# Patient Record
Sex: MALE | Race: WHITE | NOT HISPANIC OR LATINO | Employment: OTHER | URBAN - METROPOLITAN AREA
[De-identification: names, ages, dates, MRNs, and addresses within clinical notes are randomized per-mention and may not be internally consistent; named-entity substitution may affect disease eponyms.]

---

## 2017-01-16 RX ORDER — IRBESARTAN 300 MG/1
TABLET ORAL
Qty: 90 TABLET | Refills: 0 | Status: SHIPPED | OUTPATIENT
Start: 2017-01-16 | End: 2017-04-05 | Stop reason: SDUPTHER

## 2017-01-16 RX ORDER — LEVOTHYROXINE SODIUM 0.05 MG/1
TABLET ORAL
Qty: 90 TABLET | Refills: 0 | Status: SHIPPED | OUTPATIENT
Start: 2017-01-16 | End: 2017-04-11 | Stop reason: SDUPTHER

## 2017-01-17 DIAGNOSIS — I10 ESSENTIAL HYPERTENSION: Primary | ICD-10-CM

## 2017-01-17 DIAGNOSIS — E11.9 TYPE 2 DIABETES MELLITUS WITHOUT COMPLICATION, UNSPECIFIED LONG TERM INSULIN USE STATUS: ICD-10-CM

## 2017-01-17 DIAGNOSIS — Z79.899 ENCOUNTER FOR LONG-TERM (CURRENT) USE OF MEDICATIONS: ICD-10-CM

## 2017-01-17 DIAGNOSIS — E03.4 HYPOTHYROIDISM DUE TO ACQUIRED ATROPHY OF THYROID: ICD-10-CM

## 2017-01-18 LAB
ALBUMIN SERPL-MCNC: 4.6 G/DL (ref 3.5–5.2)
ALBUMIN/GLOB SERPL: 2.1 G/DL
ALP SERPL-CCNC: 73 U/L (ref 39–117)
ALT SERPL-CCNC: 25 U/L (ref 1–41)
AST SERPL-CCNC: 13 U/L (ref 1–40)
BASOPHILS # BLD AUTO: 0.05 10*3/MM3 (ref 0–0.2)
BASOPHILS NFR BLD AUTO: 0.8 % (ref 0–1.5)
BILIRUB SERPL-MCNC: 0.4 MG/DL (ref 0.1–1.2)
BUN SERPL-MCNC: 19 MG/DL (ref 8–23)
BUN/CREAT SERPL: 19.6 (ref 7–25)
CALCIUM SERPL-MCNC: 9.4 MG/DL (ref 8.6–10.5)
CHLORIDE SERPL-SCNC: 102 MMOL/L (ref 98–107)
CHOLEST SERPL-MCNC: 192 MG/DL (ref 0–200)
CO2 SERPL-SCNC: 24.5 MMOL/L (ref 22–29)
CREAT SERPL-MCNC: 0.97 MG/DL (ref 0.76–1.27)
EOSINOPHIL # BLD AUTO: 0.26 10*3/MM3 (ref 0–0.7)
EOSINOPHIL NFR BLD AUTO: 4.3 % (ref 0.3–6.2)
ERYTHROCYTE [DISTWIDTH] IN BLOOD BY AUTOMATED COUNT: 13.5 % (ref 11.5–14.5)
FT4I SERPL CALC-MCNC: 2.5 (ref 1.2–4.9)
GLOBULIN SER CALC-MCNC: 2.2 GM/DL
GLUCOSE SERPL-MCNC: 148 MG/DL (ref 65–99)
HBA1C MFR BLD: 8.55 % (ref 4.8–5.6)
HCT VFR BLD AUTO: 48.4 % (ref 40.4–52.2)
HDLC SERPL-MCNC: 57 MG/DL (ref 40–60)
HGB BLD-MCNC: 14.9 G/DL (ref 13.7–17.6)
IMM GRANULOCYTES # BLD: 0.02 10*3/MM3 (ref 0–0.03)
IMM GRANULOCYTES NFR BLD: 0.3 % (ref 0–0.5)
LDLC SERPL CALC-MCNC: 86 MG/DL (ref 0–100)
LDLC/HDLC SERPL: 1.51 {RATIO}
LYMPHOCYTES # BLD AUTO: 2.18 10*3/MM3 (ref 0.9–4.8)
LYMPHOCYTES NFR BLD AUTO: 36.4 % (ref 19.6–45.3)
MCH RBC QN AUTO: 29.6 PG (ref 27–32.7)
MCHC RBC AUTO-ENTMCNC: 30.8 G/DL (ref 32.6–36.4)
MCV RBC AUTO: 96 FL (ref 79.8–96.2)
MONOCYTES # BLD AUTO: 0.69 10*3/MM3 (ref 0.2–1.2)
MONOCYTES NFR BLD AUTO: 11.5 % (ref 5–12)
NEUTROPHILS # BLD AUTO: 2.79 10*3/MM3 (ref 1.9–8.1)
NEUTROPHILS NFR BLD AUTO: 46.7 % (ref 42.7–76)
PLATELET # BLD AUTO: 211 10*3/MM3 (ref 140–500)
POTASSIUM SERPL-SCNC: 4.5 MMOL/L (ref 3.5–5.2)
PROT SERPL-MCNC: 6.8 G/DL (ref 6–8.5)
RBC # BLD AUTO: 5.04 10*6/MM3 (ref 4.6–6)
SODIUM SERPL-SCNC: 143 MMOL/L (ref 136–145)
T3RU NFR SERPL: 30 % (ref 24–39)
T4 SERPL-MCNC: 8.4 UG/DL (ref 4.5–12)
TRIGL SERPL-MCNC: 244 MG/DL (ref 0–150)
TSH SERPL DL<=0.005 MIU/L-ACNC: 5.26 UIU/ML (ref 0.45–4.5)
VLDLC SERPL CALC-MCNC: 48.8 MG/DL (ref 5–40)
WBC # BLD AUTO: 5.99 10*3/MM3 (ref 4.5–10.7)

## 2017-01-23 ENCOUNTER — TELEPHONE (OUTPATIENT)
Dept: FAMILY MEDICINE CLINIC | Facility: CLINIC | Age: 67
End: 2017-01-23

## 2017-01-23 NOTE — TELEPHONE ENCOUNTER
----- Message from Gianna Adler sent at 1/23/2017 12:17 PM EST -----  Regarding: LAB RESULTS  Contact: [phone]  [facility]: 1/17/17 ([facility]) 6/3/16 (PHYSICAL)  NEXT APPT: NONE    PATIENT WOULD LIKE A CALL WITH HIS LAB RESULTS.    THANK YOU

## 2017-01-25 ENCOUNTER — TELEPHONE (OUTPATIENT)
Dept: FAMILY MEDICINE CLINIC | Facility: CLINIC | Age: 67
End: 2017-01-25

## 2017-01-25 NOTE — TELEPHONE ENCOUNTER
----- Message from Gianna Adler sent at 1/25/2017 11:55 AM EST -----  Regarding: BLOOD WORK  Contact: [phone]  [facility]: 1/17/17 LABS    PATIENT WANTS A COPY OF HIS LAB RESULTS TAKEN ON ABOVE DATE. DR LISA HAS NOT SIGNED OFF ON THEM YET. PATIENT COMING IN TOMORROW TO PICK HIS SAMPLES UP AND TO GET RESULTS.    THANK YOU  Lab results printed and ready for

## 2017-01-30 RX ORDER — PRAVASTATIN SODIUM 80 MG/1
TABLET ORAL
Qty: 90 TABLET | Refills: 0 | Status: SHIPPED | OUTPATIENT
Start: 2017-01-30 | End: 2017-05-07 | Stop reason: SDUPTHER

## 2017-03-08 RX ORDER — CANAGLIFLOZIN 300 MG/1
TABLET, FILM COATED ORAL
Qty: 30 TABLET | Refills: 0 | Status: SHIPPED | OUTPATIENT
Start: 2017-03-08 | End: 2017-04-05 | Stop reason: SDUPTHER

## 2017-04-06 RX ORDER — IRBESARTAN 300 MG/1
TABLET ORAL
Qty: 90 TABLET | Refills: 0 | Status: SHIPPED | OUTPATIENT
Start: 2017-04-06 | End: 2017-07-03 | Stop reason: SDUPTHER

## 2017-04-06 RX ORDER — CANAGLIFLOZIN 300 MG/1
TABLET, FILM COATED ORAL
Qty: 30 TABLET | Refills: 0 | Status: SHIPPED | OUTPATIENT
Start: 2017-04-06 | End: 2017-05-06 | Stop reason: SDUPTHER

## 2017-04-11 RX ORDER — LEVOTHYROXINE SODIUM 0.05 MG/1
TABLET ORAL
Qty: 90 TABLET | Refills: 0 | Status: SHIPPED | OUTPATIENT
Start: 2017-04-11 | End: 2017-12-06 | Stop reason: SDUPTHER

## 2017-05-08 RX ORDER — CANAGLIFLOZIN 300 MG/1
TABLET, FILM COATED ORAL
Qty: 30 TABLET | Refills: 0 | Status: SHIPPED | OUTPATIENT
Start: 2017-05-08 | End: 2017-06-03 | Stop reason: SDUPTHER

## 2017-05-08 RX ORDER — PRAVASTATIN SODIUM 80 MG/1
TABLET ORAL
Qty: 90 TABLET | Refills: 0 | Status: SHIPPED | OUTPATIENT
Start: 2017-05-08 | End: 2017-08-10 | Stop reason: SDUPTHER

## 2017-06-05 RX ORDER — CANAGLIFLOZIN 300 MG/1
TABLET, FILM COATED ORAL
Qty: 30 TABLET | Refills: 1 | Status: SHIPPED | OUTPATIENT
Start: 2017-06-05 | End: 2017-08-10 | Stop reason: SDUPTHER

## 2017-06-21 ENCOUNTER — LAB (OUTPATIENT)
Dept: FAMILY MEDICINE CLINIC | Facility: CLINIC | Age: 67
End: 2017-06-21

## 2017-06-21 DIAGNOSIS — Z79.899 ENCOUNTER FOR LONG-TERM (CURRENT) USE OF MEDICATIONS: Primary | ICD-10-CM

## 2017-06-21 DIAGNOSIS — I10 ESSENTIAL HYPERTENSION: ICD-10-CM

## 2017-06-22 LAB
ALBUMIN SERPL-MCNC: 4.6 G/DL (ref 3.5–5.2)
ALBUMIN/GLOB SERPL: 1.7 G/DL
ALP SERPL-CCNC: 79 U/L (ref 39–117)
ALT SERPL-CCNC: 42 U/L (ref 1–41)
AST SERPL-CCNC: 33 U/L (ref 1–40)
BASOPHILS # BLD AUTO: 0.05 10*3/MM3 (ref 0–0.2)
BASOPHILS NFR BLD AUTO: 0.6 % (ref 0–1.5)
BILIRUB SERPL-MCNC: 0.6 MG/DL (ref 0.1–1.2)
BUN SERPL-MCNC: 19 MG/DL (ref 8–23)
BUN/CREAT SERPL: 20.4 (ref 7–25)
CALCIUM SERPL-MCNC: 9.9 MG/DL (ref 8.6–10.5)
CHLORIDE SERPL-SCNC: 100 MMOL/L (ref 98–107)
CHOLEST SERPL-MCNC: 196 MG/DL (ref 0–200)
CO2 SERPL-SCNC: 22.4 MMOL/L (ref 22–29)
CREAT SERPL-MCNC: 0.93 MG/DL (ref 0.76–1.27)
EOSINOPHIL # BLD AUTO: 0.23 10*3/MM3 (ref 0–0.7)
EOSINOPHIL NFR BLD AUTO: 3 % (ref 0.3–6.2)
ERYTHROCYTE [DISTWIDTH] IN BLOOD BY AUTOMATED COUNT: 13.2 % (ref 11.5–14.5)
FT4I SERPL CALC-MCNC: 2.3 (ref 1.2–4.9)
GLOBULIN SER CALC-MCNC: 2.7 GM/DL
GLUCOSE SERPL-MCNC: 183 MG/DL (ref 65–99)
HBA1C MFR BLD: 8.27 % (ref 4.8–5.6)
HCT VFR BLD AUTO: 46.3 % (ref 40.4–52.2)
HDLC SERPL-MCNC: 44 MG/DL (ref 40–60)
HGB BLD-MCNC: 15.9 G/DL (ref 13.7–17.6)
IMM GRANULOCYTES # BLD: 0.02 10*3/MM3 (ref 0–0.03)
IMM GRANULOCYTES NFR BLD: 0.3 % (ref 0–0.5)
LDLC SERPL CALC-MCNC: 80 MG/DL (ref 0–100)
LDLC/HDLC SERPL: 1.83 {RATIO}
LYMPHOCYTES # BLD AUTO: 2.64 10*3/MM3 (ref 0.9–4.8)
LYMPHOCYTES NFR BLD AUTO: 34.3 % (ref 19.6–45.3)
MCH RBC QN AUTO: 29.8 PG (ref 27–32.7)
MCHC RBC AUTO-ENTMCNC: 34.3 G/DL (ref 32.6–36.4)
MCV RBC AUTO: 86.9 FL (ref 79.8–96.2)
MONOCYTES # BLD AUTO: 0.76 10*3/MM3 (ref 0.2–1.2)
MONOCYTES NFR BLD AUTO: 9.9 % (ref 5–12)
NEUTROPHILS # BLD AUTO: 4 10*3/MM3 (ref 1.9–8.1)
NEUTROPHILS NFR BLD AUTO: 51.9 % (ref 42.7–76)
PLATELET # BLD AUTO: 201 10*3/MM3 (ref 140–500)
POTASSIUM SERPL-SCNC: 4.7 MMOL/L (ref 3.5–5.2)
PROT SERPL-MCNC: 7.3 G/DL (ref 6–8.5)
RBC # BLD AUTO: 5.33 10*6/MM3 (ref 4.6–6)
SODIUM SERPL-SCNC: 141 MMOL/L (ref 136–145)
T3RU NFR SERPL: 28 % (ref 24–39)
T4 SERPL-MCNC: 8.2 UG/DL (ref 4.5–12)
TRIGL SERPL-MCNC: 358 MG/DL (ref 0–150)
TSH SERPL DL<=0.005 MIU/L-ACNC: 3.76 UIU/ML (ref 0.45–4.5)
VLDLC SERPL CALC-MCNC: 71.6 MG/DL (ref 5–40)
WBC # BLD AUTO: 7.7 10*3/MM3 (ref 4.5–10.7)

## 2017-06-28 ENCOUNTER — TELEPHONE (OUTPATIENT)
Dept: FAMILY MEDICINE CLINIC | Facility: CLINIC | Age: 67
End: 2017-06-28

## 2017-06-28 NOTE — TELEPHONE ENCOUNTER
----- Message from Desiree Castaneda sent at 6/28/2017  9:17 AM EDT -----  Ph [phone]    Blood work results      Last office visit 6/3/16

## 2017-07-03 RX ORDER — IRBESARTAN 300 MG/1
TABLET ORAL
Qty: 30 TABLET | Refills: 0 | Status: SHIPPED | OUTPATIENT
Start: 2017-07-03 | End: 2017-08-16 | Stop reason: SDUPTHER

## 2017-07-17 RX ORDER — LEVOTHYROXINE SODIUM 0.05 MG/1
TABLET ORAL
Qty: 90 TABLET | Refills: 0 | Status: SHIPPED | OUTPATIENT
Start: 2017-07-17 | End: 2017-10-17 | Stop reason: SDUPTHER

## 2017-08-10 RX ORDER — PRAVASTATIN SODIUM 80 MG/1
TABLET ORAL
Qty: 90 TABLET | Refills: 0 | Status: SHIPPED | OUTPATIENT
Start: 2017-08-10 | End: 2017-11-12 | Stop reason: SDUPTHER

## 2017-08-10 RX ORDER — CANAGLIFLOZIN 300 MG/1
TABLET, FILM COATED ORAL
Qty: 30 TABLET | Refills: 1 | Status: SHIPPED | OUTPATIENT
Start: 2017-08-10 | End: 2017-10-10 | Stop reason: SDUPTHER

## 2017-08-16 RX ORDER — IRBESARTAN 300 MG/1
TABLET ORAL
Qty: 30 TABLET | Refills: 5 | Status: SHIPPED | OUTPATIENT
Start: 2017-08-16 | End: 2018-02-14 | Stop reason: SDUPTHER

## 2017-10-11 ENCOUNTER — TELEPHONE (OUTPATIENT)
Dept: FAMILY MEDICINE CLINIC | Facility: CLINIC | Age: 67
End: 2017-10-11

## 2017-10-11 RX ORDER — CANAGLIFLOZIN 300 MG/1
TABLET, FILM COATED ORAL
Qty: 30 TABLET | Refills: 1 | Status: SHIPPED | OUTPATIENT
Start: 2017-10-11 | End: 2018-03-18 | Stop reason: SDUPTHER

## 2017-10-11 NOTE — TELEPHONE ENCOUNTER
----- Message from Gianna Adler sent at 10/11/2017  2:39 PM EDT -----  Regarding: MEDS  Contact: [phone]  sitaGLIPtin (JANUVIA) 100 MG tablet  INVOKANA 300 MG tablet    PATIENT WOULD LIKE A CALL FROM DR LISA ABOUT THESE MEDS. HE HAS SOME QUESTIONS TO ASK.    INFORMED PATIENT GIVE 24-48 HRS FOR CALL BACK.    THANK YOU      [facility] - 2/14/17

## 2017-10-17 RX ORDER — LEVOTHYROXINE SODIUM 0.05 MG/1
TABLET ORAL
Qty: 90 TABLET | Refills: 0 | Status: SHIPPED | OUTPATIENT
Start: 2017-10-17 | End: 2018-01-14 | Stop reason: SDUPTHER

## 2017-11-13 RX ORDER — PRAVASTATIN SODIUM 80 MG/1
TABLET ORAL
Qty: 90 TABLET | Refills: 0 | Status: SHIPPED | OUTPATIENT
Start: 2017-11-13 | End: 2018-04-23 | Stop reason: SDUPTHER

## 2017-11-29 DIAGNOSIS — I10 ESSENTIAL HYPERTENSION: Primary | ICD-10-CM

## 2017-11-29 DIAGNOSIS — Z00.00 ROUTINE MEDICAL EXAM: ICD-10-CM

## 2017-11-29 DIAGNOSIS — E03.4 HYPOTHYROIDISM DUE TO ACQUIRED ATROPHY OF THYROID: ICD-10-CM

## 2017-11-29 DIAGNOSIS — Z79.899 ENCOUNTER FOR LONG-TERM (CURRENT) USE OF MEDICATIONS: ICD-10-CM

## 2017-11-29 DIAGNOSIS — N40.0 ENLARGED PROSTATE: ICD-10-CM

## 2017-11-29 DIAGNOSIS — E11.9 TYPE 2 DIABETES MELLITUS WITHOUT COMPLICATION, UNSPECIFIED LONG TERM INSULIN USE STATUS: ICD-10-CM

## 2017-11-30 ENCOUNTER — TELEPHONE (OUTPATIENT)
Dept: FAMILY MEDICINE CLINIC | Facility: CLINIC | Age: 67
End: 2017-11-30

## 2017-12-02 LAB
ALBUMIN SERPL-MCNC: 4.6 G/DL (ref 3.5–5.2)
ALBUMIN/GLOB SERPL: 1.6 G/DL
ALP SERPL-CCNC: 95 U/L (ref 39–117)
ALT SERPL-CCNC: 42 U/L (ref 1–41)
AST SERPL-CCNC: 24 U/L (ref 1–40)
BASOPHILS # BLD AUTO: 0.06 10*3/MM3 (ref 0–0.2)
BASOPHILS NFR BLD AUTO: 0.9 % (ref 0–1.5)
BILIRUB SERPL-MCNC: 0.5 MG/DL (ref 0.1–1.2)
BUN SERPL-MCNC: 18 MG/DL (ref 8–23)
BUN/CREAT SERPL: 21.2 (ref 7–25)
CALCIUM SERPL-MCNC: 9.6 MG/DL (ref 8.6–10.5)
CHLORIDE SERPL-SCNC: 103 MMOL/L (ref 98–107)
CHOLEST SERPL-MCNC: 198 MG/DL (ref 0–200)
CO2 SERPL-SCNC: 26.1 MMOL/L (ref 22–29)
CREAT SERPL-MCNC: 0.85 MG/DL (ref 0.76–1.27)
EOSINOPHIL # BLD AUTO: 0.25 10*3/MM3 (ref 0–0.7)
EOSINOPHIL NFR BLD AUTO: 3.7 % (ref 0.3–6.2)
ERYTHROCYTE [DISTWIDTH] IN BLOOD BY AUTOMATED COUNT: 13.2 % (ref 11.5–14.5)
FT4I SERPL CALC-MCNC: 2.2 (ref 1.2–4.9)
GFR SERPLBLD CREATININE-BSD FMLA CKD-EPI: 109 ML/MIN/1.73
GFR SERPLBLD CREATININE-BSD FMLA CKD-EPI: 90 ML/MIN/1.73
GLOBULIN SER CALC-MCNC: 2.8 GM/DL
GLUCOSE SERPL-MCNC: 221 MG/DL (ref 65–99)
HBA1C MFR BLD: 8.75 % (ref 4.8–5.6)
HCT VFR BLD AUTO: 47.1 % (ref 40.4–52.2)
HDLC SERPL-MCNC: 43 MG/DL (ref 40–60)
HGB BLD-MCNC: 15.6 G/DL (ref 13.7–17.6)
IMM GRANULOCYTES # BLD: 0.02 10*3/MM3 (ref 0–0.03)
IMM GRANULOCYTES NFR BLD: 0.3 % (ref 0–0.5)
LDLC SERPL CALC-MCNC: 87 MG/DL (ref 0–100)
LDLC/HDLC SERPL: 2.01 {RATIO}
LYMPHOCYTES # BLD AUTO: 2.2 10*3/MM3 (ref 0.9–4.8)
LYMPHOCYTES NFR BLD AUTO: 32.4 % (ref 19.6–45.3)
MCH RBC QN AUTO: 29.8 PG (ref 27–32.7)
MCHC RBC AUTO-ENTMCNC: 33.1 G/DL (ref 32.6–36.4)
MCV RBC AUTO: 90.1 FL (ref 79.8–96.2)
MONOCYTES # BLD AUTO: 0.54 10*3/MM3 (ref 0.2–1.2)
MONOCYTES NFR BLD AUTO: 7.9 % (ref 5–12)
NEUTROPHILS # BLD AUTO: 3.73 10*3/MM3 (ref 1.9–8.1)
NEUTROPHILS NFR BLD AUTO: 54.8 % (ref 42.7–76)
PLATELET # BLD AUTO: 210 10*3/MM3 (ref 140–500)
POTASSIUM SERPL-SCNC: 4.2 MMOL/L (ref 3.5–5.2)
PROT SERPL-MCNC: 7.4 G/DL (ref 6–8.5)
PSA SERPL-MCNC: 1.3 NG/ML (ref 0–4)
RBC # BLD AUTO: 5.23 10*6/MM3 (ref 4.6–6)
SODIUM SERPL-SCNC: 143 MMOL/L (ref 136–145)
T3RU NFR SERPL: 28 % (ref 24–39)
T4 SERPL-MCNC: 7.8 UG/DL (ref 4.5–12)
TRIGL SERPL-MCNC: 342 MG/DL (ref 0–150)
TSH SERPL DL<=0.005 MIU/L-ACNC: 4.16 UIU/ML (ref 0.45–4.5)
VLDLC SERPL CALC-MCNC: 68.4 MG/DL (ref 5–40)
WBC # BLD AUTO: 6.8 10*3/MM3 (ref 4.5–10.7)

## 2017-12-06 ENCOUNTER — OFFICE VISIT (OUTPATIENT)
Dept: FAMILY MEDICINE CLINIC | Facility: CLINIC | Age: 67
End: 2017-12-06

## 2017-12-06 VITALS
HEART RATE: 64 BPM | DIASTOLIC BLOOD PRESSURE: 72 MMHG | OXYGEN SATURATION: 99 % | TEMPERATURE: 97.8 F | SYSTOLIC BLOOD PRESSURE: 138 MMHG | HEIGHT: 67 IN | WEIGHT: 175.6 LBS | BODY MASS INDEX: 27.56 KG/M2

## 2017-12-06 DIAGNOSIS — F41.9 ANXIETY: ICD-10-CM

## 2017-12-06 DIAGNOSIS — Z00.00 ANNUAL PHYSICAL EXAM: Primary | ICD-10-CM

## 2017-12-06 DIAGNOSIS — N52.01 ERECTILE DYSFUNCTION DUE TO ARTERIAL INSUFFICIENCY: ICD-10-CM

## 2017-12-06 DIAGNOSIS — F17.211 CIGARETTE NICOTINE DEPENDENCE IN REMISSION: ICD-10-CM

## 2017-12-06 DIAGNOSIS — N40.1 BPH ASSOCIATED WITH NOCTURIA: ICD-10-CM

## 2017-12-06 DIAGNOSIS — R35.1 BPH ASSOCIATED WITH NOCTURIA: ICD-10-CM

## 2017-12-06 DIAGNOSIS — I10 ESSENTIAL HYPERTENSION: ICD-10-CM

## 2017-12-06 DIAGNOSIS — E11.9 TYPE 2 DIABETES MELLITUS WITHOUT COMPLICATION, UNSPECIFIED LONG TERM INSULIN USE STATUS: ICD-10-CM

## 2017-12-06 DIAGNOSIS — E03.4 HYPOTHYROIDISM DUE TO ACQUIRED ATROPHY OF THYROID: ICD-10-CM

## 2017-12-06 PROBLEM — Z86.79 HISTORY OF ASCVD: Status: ACTIVE | Noted: 2017-12-06

## 2017-12-06 PROBLEM — N52.9 VASCULOGENIC ERECTILE DYSFUNCTION: Status: ACTIVE | Noted: 2017-12-06

## 2017-12-06 PROBLEM — K42.9 UMBILICAL HERNIA: Status: ACTIVE | Noted: 2017-12-06

## 2017-12-06 PROBLEM — F17.201 NICOTINE DEPENDENCE IN REMISSION: Status: ACTIVE | Noted: 2017-12-06

## 2017-12-06 LAB
BILIRUB BLD-MCNC: NEGATIVE MG/DL
CLARITY, POC: CLEAR
COLOR UR: YELLOW
GLUCOSE UR STRIP-MCNC: ABNORMAL MG/DL
KETONES UR QL: NEGATIVE
LEUKOCYTE EST, POC: NEGATIVE
Lab: NORMAL
Lab: NORMAL
NITRITE UR-MCNC: NEGATIVE MG/ML
PH UR: 6 [PH] (ref 5–8)
PROT UR STRIP-MCNC: NEGATIVE MG/DL
RBC # UR STRIP: NEGATIVE /UL
SP GR UR: 1.03 (ref 1–1.03)
UROBILINOGEN UR QL: NORMAL

## 2017-12-06 PROCEDURE — 99214 OFFICE O/P EST MOD 30 MIN: CPT | Performed by: FAMILY MEDICINE

## 2017-12-06 PROCEDURE — 81003 URINALYSIS AUTO W/O SCOPE: CPT | Performed by: FAMILY MEDICINE

## 2017-12-06 PROCEDURE — 93000 ELECTROCARDIOGRAM COMPLETE: CPT | Performed by: FAMILY MEDICINE

## 2017-12-06 RX ORDER — FINASTERIDE 5 MG/1
5 TABLET, FILM COATED ORAL DAILY
Qty: 30 TABLET | Refills: 11 | Status: SHIPPED | OUTPATIENT
Start: 2017-12-06 | End: 2019-01-30

## 2017-12-06 RX ORDER — ALPRAZOLAM 0.5 MG/1
0.5 TABLET ORAL 2 TIMES DAILY PRN
Qty: 60 TABLET | Refills: 0 | Status: SHIPPED | OUTPATIENT
Start: 2017-12-06 | End: 2018-12-27

## 2017-12-06 RX ORDER — SILDENAFIL CITRATE 20 MG/1
20 TABLET ORAL 3 TIMES DAILY
Qty: 90 TABLET | Refills: 11 | Status: SHIPPED | OUTPATIENT
Start: 2017-12-06 | End: 2018-12-18

## 2017-12-06 NOTE — PROGRESS NOTES
Chief Complaint   Patient presents with   • Annual Exam       Subjective.../HPI  Patient present today with desire for PE  1) Jesus has a history of chronic hypertension and has been well controlled on current medications.  Patient reports has had hypertension for 15 years. He is tolerating medications without side effect. He reports no vision changes, headaches or lightheadedness. He is requesting refills of medications  2) Jesus has a history of chronic hyperlipidemia and has been well controlled on current medications.  Patient reports has had hyperlipidemia for 15 years. He is tolerating medications without side effect.  Hyperlipidemia labs will be reviewed today.  3) DM-can't afford meds  4) BPH -nocturia x 4 3 yung  5) L cheek rash  6) anxiety- Xanax 1 q 2wweks when caring folr grand kids  I have reviewed the patient's medical history in detail and updated the computerized patient record.        Family History   Problem Relation Age of Onset   • Cancer Mother      skin   • COPD Mother    • Hyperlipidemia Mother    • Hypertension Mother    • Cancer Father      lung   • Diabetes Father    • Heart disease Father    • Hyperlipidemia Father    • Hypertension Father    • Early death Brother      50   • Alcohol abuse Brother        Social History     Social History   • Marital status: Single     Spouse name: N/A   • Number of children: N/A   • Years of education: N/A     Occupational History   • Not on file.     Social History Main Topics   • Smoking status: Never Smoker   • Smokeless tobacco: Never Used   • Alcohol use 4.2 oz/week     7 Shots of liquor per week   • Drug use: Not on file   • Sexual activity: Defer     Other Topics Concern   • Not on file     Social History Narrative       Review of Systems:   Review of Systems   Constitutional: Negative for chills, fatigue, fever and unexpected weight change.   HENT: Negative for ear pain, hearing loss, sinus pressure, sore throat and tinnitus.    Eyes: Negative  for pain, discharge and redness.   Respiratory: Negative for cough, shortness of breath and wheezing.    Cardiovascular: Negative for chest pain, palpitations and leg swelling.   Gastrointestinal: Negative for abdominal pain, constipation, diarrhea and nausea.   Endocrine: Negative for cold intolerance and heat intolerance.   Genitourinary: Negative for difficulty urinating, flank pain and urgency.   Musculoskeletal: Negative for back pain, joint swelling and myalgias.   Skin: Negative for rash and wound.   Allergic/Immunologic: Negative for environmental allergies and food allergies.   Neurological: Negative for dizziness, seizures, numbness and headaches.   Hematological: Negative for adenopathy. Does not bruise/bleed easily.   Psychiatric/Behavioral: Negative for decreased concentration, dysphoric mood and sleep disturbance. The patient is not nervous/anxious.    All other systems reviewed and are negative.        Physical Exam   Constitutional: He is oriented to person, place, and time. He appears well-developed and well-nourished.   HENT:   Head: Normocephalic.   Nose: Nose normal.   Mouth/Throat: Oropharynx is clear and moist.   R ear cerumen plugged   Eyes: Conjunctivae and EOM are normal. Pupils are equal, round, and reactive to light.   Neck: Normal range of motion. Neck supple. No JVD present. No tracheal deviation present. No thyromegaly present.   Cardiovascular: Normal rate, regular rhythm, normal heart sounds and intact distal pulses.    Pulmonary/Chest: Effort normal and breath sounds normal. No stridor. No respiratory distress. He has no wheezes. He has no rales. He exhibits no tenderness.   Abdominal: Soft. He exhibits no distension and no mass. There is no tenderness. There is no rebound and no guarding. A hernia is present.   Umbilical hernia   Genitourinary: Penis normal.   Musculoskeletal: Normal range of motion.   Lymphadenopathy:     He has no cervical adenopathy.   Neurological: He is alert  "and oriented to person, place, and time.   Skin: Skin is warm and dry. Rash noted.   L cheek with scaling   Psychiatric: He has a normal mood and affect. His behavior is normal. Judgment and thought content normal.   Vitals reviewed.        Vital Signs     Vitals:    12/06/17 0948   BP: 138/72   BP Location: Left arm   Patient Position: Sitting   Cuff Size: Adult   Pulse: 64   Temp: 97.8 °F (36.6 °C)   TempSrc: Oral   SpO2: 99%   Weight: 79.7 kg (175 lb 9.6 oz)   Height: 170.2 cm (67.01\")          Results Review:      REVIEWED AND DISCUSSED CLINICAL RESULTS WITH PATIENT      Requested Prescriptions      No prescriptions requested or ordered in this encounter         Current Outpatient Prescriptions:   •  aspirin 81 MG tablet, Take 81 mg by mouth daily., Disp: , Rfl:   •  clopidogrel (PLAVIX) 75 MG tablet, Take 1 tablet by mouth daily., Disp: 90 tablet, Rfl: 1  •  glucose blood test strip, DX: E11.9 tests blood sugar twice daily using One Touch system, Disp: 100 each, Rfl: 11  •  INVOKANA 300 MG tablet, TAKE ONE TABLET BY MOUTH ONCE DAILY, Disp: 30 tablet, Rfl: 1  •  irbesartan (AVAPRO) 300 MG tablet, TAKE ONE TABLET BY MOUTH ONCE DAILY, Disp: 30 tablet, Rfl: 5  •  levothyroxine (SYNTHROID, LEVOTHROID) 50 MCG tablet, TAKE ONE TABLET BY MOUTH ONCE DAILY, Disp: 90 tablet, Rfl: 0  •  metFORMIN (GLUCOPHAGE) 500 MG tablet, TAKE ONE TABLET BY MOUTH TWICE DAILY WITH MEALS, Disp: 90 tablet, Rfl: 0  •  NITROSTAT 0.4 MG SL tablet, DISSOLVE ONE TABLET UNDER THE TONGUE AS NEEDED, Disp: 25 tablet, Rfl: 0  •  pravastatin (PRAVACHOL) 80 MG tablet, TAKE ONE TABLET BY MOUTH ONCE DAILY, Disp: 90 tablet, Rfl: 0  •  sitaGLIPtin (JANUVIA) 100 MG tablet, Take 1 tablet by mouth daily., Disp: 60 tablet, Rfl: 0      ECG 12 Lead  Date/Time: 12/6/2017 11:45 AM  Performed by: DAWN LISA  Authorized by: DAWN LISA   Comparison: not compared with previous ECG   Rhythm: sinus rhythm  Conduction: conduction normal  ST Segments: ST " segments normal  T Waves: T waves normal  QRS axis: left  Other: no other findings  Clinical impression: normal ECG            Assessment/Plan     There are no diagnoses linked to this encounter.     No Follow-up on file.    Shin Melara M.D  12/06/17  9:51 AM

## 2017-12-07 LAB
HCV AB S/CO SERPL IA: <0.1 S/CO RATIO (ref 0–0.9)
WRITTEN AUTHORIZATION: NORMAL

## 2018-01-15 RX ORDER — LEVOTHYROXINE SODIUM 0.05 MG/1
TABLET ORAL
Qty: 90 TABLET | Refills: 0 | Status: SHIPPED | OUTPATIENT
Start: 2018-01-15 | End: 2018-04-15 | Stop reason: SDUPTHER

## 2018-02-08 ENCOUNTER — TELEPHONE (OUTPATIENT)
Dept: INTERNAL MEDICINE | Facility: CLINIC | Age: 68
End: 2018-02-08

## 2018-02-14 RX ORDER — IRBESARTAN 300 MG/1
TABLET ORAL
Qty: 30 TABLET | Refills: 5 | Status: SHIPPED | OUTPATIENT
Start: 2018-02-14 | End: 2018-07-31 | Stop reason: SDUPTHER

## 2018-03-19 RX ORDER — CANAGLIFLOZIN 300 MG/1
TABLET, FILM COATED ORAL
Qty: 30 TABLET | Refills: 1 | Status: SHIPPED | OUTPATIENT
Start: 2018-03-19 | End: 2018-05-13 | Stop reason: SDUPTHER

## 2018-04-16 RX ORDER — LEVOTHYROXINE SODIUM 0.05 MG/1
TABLET ORAL
Qty: 90 TABLET | Refills: 0 | Status: SHIPPED | OUTPATIENT
Start: 2018-04-16 | End: 2018-06-16 | Stop reason: SDUPTHER

## 2018-04-24 RX ORDER — PRAVASTATIN SODIUM 80 MG/1
TABLET ORAL
Qty: 90 TABLET | Refills: 0 | Status: SHIPPED | OUTPATIENT
Start: 2018-04-24 | End: 2018-06-16 | Stop reason: SDUPTHER

## 2018-04-30 DIAGNOSIS — I10 HYPERTENSION, UNSPECIFIED TYPE: Primary | ICD-10-CM

## 2018-04-30 DIAGNOSIS — Z79.899 ENCOUNTER FOR LONG-TERM (CURRENT) USE OF MEDICATIONS: ICD-10-CM

## 2018-04-30 DIAGNOSIS — E11.8 TYPE 2 DIABETES MELLITUS WITH COMPLICATION, WITHOUT LONG-TERM CURRENT USE OF INSULIN (HCC): ICD-10-CM

## 2018-05-02 LAB
ALBUMIN SERPL-MCNC: 4.3 G/DL (ref 3.5–5.2)
ALBUMIN/GLOB SERPL: 1.5 G/DL
ALP SERPL-CCNC: 80 U/L (ref 39–117)
ALT SERPL-CCNC: 39 U/L (ref 1–41)
AST SERPL-CCNC: 36 U/L (ref 1–40)
BASOPHILS # BLD AUTO: 0.07 10*3/MM3 (ref 0–0.2)
BASOPHILS NFR BLD AUTO: 1 % (ref 0–1.5)
BILIRUB SERPL-MCNC: 0.5 MG/DL (ref 0.1–1.2)
BUN SERPL-MCNC: 19 MG/DL (ref 8–23)
BUN/CREAT SERPL: 22.4 (ref 7–25)
CALCIUM SERPL-MCNC: 9.4 MG/DL (ref 8.6–10.5)
CHLORIDE SERPL-SCNC: 102 MMOL/L (ref 98–107)
CHOLEST SERPL-MCNC: 203 MG/DL (ref 0–200)
CO2 SERPL-SCNC: 24.4 MMOL/L (ref 22–29)
CREAT SERPL-MCNC: 0.85 MG/DL (ref 0.76–1.27)
EOSINOPHIL # BLD AUTO: 0.3 10*3/MM3 (ref 0–0.7)
EOSINOPHIL NFR BLD AUTO: 4.2 % (ref 0.3–6.2)
ERYTHROCYTE [DISTWIDTH] IN BLOOD BY AUTOMATED COUNT: 13.2 % (ref 11.5–14.5)
FT4I SERPL CALC-MCNC: 2.3 (ref 1.2–4.9)
GFR SERPLBLD CREATININE-BSD FMLA CKD-EPI: 109 ML/MIN/1.73
GFR SERPLBLD CREATININE-BSD FMLA CKD-EPI: 90 ML/MIN/1.73
GLOBULIN SER CALC-MCNC: 2.9 GM/DL
GLUCOSE SERPL-MCNC: 179 MG/DL (ref 65–99)
HBA1C MFR BLD: 9.39 % (ref 4.8–5.6)
HCT VFR BLD AUTO: 46.6 % (ref 40.4–52.2)
HDLC SERPL-MCNC: 43 MG/DL (ref 40–60)
HGB BLD-MCNC: 15.2 G/DL (ref 13.7–17.6)
IMM GRANULOCYTES # BLD: 0 10*3/MM3 (ref 0–0.03)
IMM GRANULOCYTES NFR BLD: 0 % (ref 0–0.5)
LDLC SERPL CALC-MCNC: 98 MG/DL (ref 0–100)
LDLC/HDLC SERPL: 2.28 {RATIO}
LYMPHOCYTES # BLD AUTO: 2.43 10*3/MM3 (ref 0.9–4.8)
LYMPHOCYTES NFR BLD AUTO: 34 % (ref 19.6–45.3)
MCH RBC QN AUTO: 29.4 PG (ref 27–32.7)
MCHC RBC AUTO-ENTMCNC: 32.6 G/DL (ref 32.6–36.4)
MCV RBC AUTO: 90.1 FL (ref 79.8–96.2)
MONOCYTES # BLD AUTO: 0.61 10*3/MM3 (ref 0.2–1.2)
MONOCYTES NFR BLD AUTO: 8.5 % (ref 5–12)
NEUTROPHILS # BLD AUTO: 3.73 10*3/MM3 (ref 1.9–8.1)
NEUTROPHILS NFR BLD AUTO: 52.3 % (ref 42.7–76)
PLATELET # BLD AUTO: 203 10*3/MM3 (ref 140–500)
POTASSIUM SERPL-SCNC: 4.3 MMOL/L (ref 3.5–5.2)
PROT SERPL-MCNC: 7.2 G/DL (ref 6–8.5)
RBC # BLD AUTO: 5.17 10*6/MM3 (ref 4.6–6)
SODIUM SERPL-SCNC: 142 MMOL/L (ref 136–145)
T3RU NFR SERPL: 28 % (ref 24–39)
T4 SERPL-MCNC: 8.2 UG/DL (ref 4.5–12)
TRIGL SERPL-MCNC: 310 MG/DL (ref 0–150)
TSH SERPL DL<=0.005 MIU/L-ACNC: 3.49 UIU/ML (ref 0.45–4.5)
VLDLC SERPL CALC-MCNC: 62 MG/DL (ref 5–40)
WBC # BLD AUTO: 7.14 10*3/MM3 (ref 4.5–10.7)

## 2018-05-09 ENCOUNTER — TELEPHONE (OUTPATIENT)
Dept: INTERNAL MEDICINE | Facility: CLINIC | Age: 68
End: 2018-05-09

## 2018-05-14 RX ORDER — CANAGLIFLOZIN 300 MG/1
TABLET, FILM COATED ORAL
Qty: 30 TABLET | Refills: 1 | Status: SHIPPED | OUTPATIENT
Start: 2018-05-14 | End: 2018-07-17 | Stop reason: SDUPTHER

## 2018-06-18 RX ORDER — LEVOTHYROXINE SODIUM 0.05 MG/1
TABLET ORAL
Qty: 90 TABLET | Refills: 0 | Status: SHIPPED | OUTPATIENT
Start: 2018-06-18 | End: 2018-10-14 | Stop reason: SDUPTHER

## 2018-06-18 RX ORDER — PRAVASTATIN SODIUM 80 MG/1
TABLET ORAL
Qty: 90 TABLET | Refills: 0 | Status: SHIPPED | OUTPATIENT
Start: 2018-06-18 | End: 2019-01-30

## 2018-07-17 RX ORDER — CANAGLIFLOZIN 300 MG/1
TABLET, FILM COATED ORAL
Qty: 30 TABLET | Refills: 1 | Status: SHIPPED | OUTPATIENT
Start: 2018-07-17 | End: 2019-03-05

## 2018-07-31 ENCOUNTER — OFFICE VISIT (OUTPATIENT)
Dept: INTERNAL MEDICINE | Facility: CLINIC | Age: 68
End: 2018-07-31

## 2018-07-31 VITALS
DIASTOLIC BLOOD PRESSURE: 64 MMHG | WEIGHT: 172.2 LBS | HEART RATE: 68 BPM | HEIGHT: 67 IN | TEMPERATURE: 97.9 F | SYSTOLIC BLOOD PRESSURE: 117 MMHG | OXYGEN SATURATION: 92 % | BODY MASS INDEX: 27.03 KG/M2

## 2018-07-31 DIAGNOSIS — I10 ESSENTIAL HYPERTENSION: Primary | ICD-10-CM

## 2018-07-31 DIAGNOSIS — E11.9 TYPE 2 DIABETES MELLITUS WITHOUT COMPLICATION, WITHOUT LONG-TERM CURRENT USE OF INSULIN (HCC): ICD-10-CM

## 2018-07-31 DIAGNOSIS — E78.5 HYPERLIPIDEMIA LDL GOAL <70: ICD-10-CM

## 2018-07-31 PROCEDURE — 99213 OFFICE O/P EST LOW 20 MIN: CPT | Performed by: FAMILY MEDICINE

## 2018-07-31 RX ORDER — RANOLAZINE 500 MG/1
500 TABLET, EXTENDED RELEASE ORAL
COMMUNITY
Start: 2018-07-04 | End: 2019-05-14 | Stop reason: SDUPTHER

## 2018-07-31 RX ORDER — ISOSORBIDE MONONITRATE 30 MG/1
30 TABLET, EXTENDED RELEASE ORAL
COMMUNITY
Start: 2018-07-04 | End: 2019-01-30

## 2018-07-31 RX ORDER — IRBESARTAN 300 MG/1
300 TABLET ORAL DAILY
Qty: 90 TABLET | Refills: 3 | Status: SHIPPED | OUTPATIENT
Start: 2018-07-31 | End: 2019-08-14 | Stop reason: SDUPTHER

## 2018-07-31 RX ORDER — ROSUVASTATIN CALCIUM 40 MG/1
40 TABLET, COATED ORAL
COMMUNITY
Start: 2018-07-23 | End: 2018-12-18

## 2018-07-31 RX ORDER — ROSUVASTATIN CALCIUM 10 MG/1
10 TABLET, COATED ORAL DAILY
Qty: 90 TABLET | Refills: 3 | Status: SHIPPED | OUTPATIENT
Start: 2018-07-31 | End: 2018-12-18

## 2018-07-31 NOTE — PROGRESS NOTES
CC:DM,lipids    Subjective.../HPI  Patient present today with1) DM-    2) lipids.ehI have reviewed the pa Jesus has a history of chronic hyperlipidemia and has been well controlled on current medications.  Patient reports has had hyperlipidemia for 15 years. He is tolerating medications without side effect.  Hyperlipidemia labs will be reviewed today.    Past Medical History:   Diagnosis Date   • Diabetes mellitus (CMS/HCC)    • Hyperlipidemia    • Hypertension    • Hypothyroidism        Past Surgical History:   Procedure Laterality Date   • CARDIAC SURGERY     • SKIN BIOPSY      shoulder left       Family History   Problem Relation Age of Onset   • Cancer Mother         skin   • COPD Mother    • Hyperlipidemia Mother    • Hypertension Mother    • Cancer Father         lung   • Diabetes Father    • Heart disease Father    • Hyperlipidemia Father    • Hypertension Father    • Early death Brother         50   • Alcohol abuse Brother        Social History     Social History   • Marital status: Single     Spouse name: N/A   • Number of children: N/A   • Years of education: N/A     Occupational History   • Not on file.     Social History Main Topics   • Smoking status: Never Smoker   • Smokeless tobacco: Never Used   • Alcohol use 4.2 oz/week     7 Shots of liquor per week   • Drug use: Unknown   • Sexual activity: Defer     Other Topics Concern   • Not on file     Social History Narrative   • No narrative on file       Most Recent Immunizations   Administered Date(s) Administered   • Flu Vaccine High Dose PF 65YR+ 01/25/2018       Review of Systems:   Review of Systems   Constitutional: Negative.    HENT: Negative.    Eyes: Negative.    Respiratory: Negative.    Cardiovascular: Negative.    Gastrointestinal: Negative.    Endocrine: Negative.    Genitourinary: Negative.    Musculoskeletal: Negative.    Skin: Negative.    Allergic/Immunologic: Negative.    Hematological: Negative.    Psychiatric/Behavioral: Negative.   "        Physical Exam   Constitutional: He is oriented to person, place, and time. He appears well-developed and well-nourished.   Cardiovascular: Normal rate, regular rhythm and normal heart sounds.    Pulmonary/Chest: Effort normal and breath sounds normal.   Neurological: He is oriented to person, place, and time.   Psychiatric: He has a normal mood and affect.   Vitals reviewed.        Vital Signs     Vitals:    07/31/18 1421   BP: 117/64   Pulse: 68   Temp: 97.9 °F (36.6 °C)   TempSrc: Oral   SpO2: 92%   Weight: 78.1 kg (172 lb 3.2 oz)   Height: 170.2 cm (67.01\")          Results Review:      REVIEWED AND DISCUSSED CLINICAL RESULTS WITH PATIENT      Requested Prescriptions     Signed Prescriptions Disp Refills   • rosuvastatin (CRESTOR) 10 MG tablet 90 tablet 3     Sig: Take 1 tablet by mouth Daily.         Current Outpatient Prescriptions:   •  ALPRAZolam (XANAX) 0.5 MG tablet, Take 1 tablet by mouth 2 (Two) Times a Day As Needed for Anxiety., Disp: 60 tablet, Rfl: 0  •  aspirin 81 MG tablet, Take 81 mg by mouth daily., Disp: , Rfl:   •  clopidogrel (PLAVIX) 75 MG tablet, Take 1 tablet by mouth daily., Disp: 90 tablet, Rfl: 1  •  finasteride (PROSCAR) 5 MG tablet, Take 1 tablet by mouth Daily., Disp: 30 tablet, Rfl: 11  •  glucose blood test strip, DX: E11.9 tests blood sugar twice daily using One Touch system, Disp: 100 each, Rfl: 11  •  glucose blood test strip, 1 each by Other route., Disp: , Rfl:   •  INVOKANA 300 MG tablet, TAKE 1 TABLET BY MOUTH ONCE DAILY, Disp: 30 tablet, Rfl: 1  •  irbesartan (AVAPRO) 300 MG tablet, Take 1 tablet by mouth Daily., Disp: 90 tablet, Rfl: 3  •  isosorbide mononitrate (IMDUR) 30 MG 24 hr tablet, Take 30 mg by mouth., Disp: , Rfl:   •  levothyroxine (SYNTHROID, LEVOTHROID) 50 MCG tablet, TAKE 1 TABLET BY MOUTH ONCE DAILY, Disp: 90 tablet, Rfl: 0  •  metFORMIN (GLUCOPHAGE) 1000 MG tablet, Take 1 tablet by mouth 2 (Two) Times a Day With Meals., Disp: 60 tablet, Rfl: 11  •  " metFORMIN (GLUCOPHAGE) 500 MG tablet, TAKE 1 TABLET BY MOUTH TWICE DAILY WITH MEALS, Disp: 90 tablet, Rfl: 0  •  NITROSTAT 0.4 MG SL tablet, DISSOLVE ONE TABLET UNDER THE TONGUE AS NEEDED, Disp: 25 tablet, Rfl: 0  •  pravastatin (PRAVACHOL) 80 MG tablet, TAKE ONE TABLET BY MOUTH ONCE DAILY, Disp: 90 tablet, Rfl: 0  •  ranolazine (RANEXA) 500 MG 12 hr tablet, Take 500 mg by mouth., Disp: , Rfl:   •  rosuvastatin (CRESTOR) 40 MG tablet, Take 40 mg by mouth., Disp: , Rfl:   •  sildenafil (REVATIO) 20 MG tablet, Take 1 tablet by mouth 3 (Three) Times a Day., Disp: 90 tablet, Rfl: 11  •  sitaGLIPtin (JANUVIA) 100 MG tablet, Take 1 tablet by mouth daily., Disp: 60 tablet, Rfl: 0  •  rosuvastatin (CRESTOR) 10 MG tablet, Take 1 tablet by mouth Daily., Disp: 90 tablet, Rfl: 3    Procedures          Diagnoses and all orders for this visit:    Essential hypertension    Type 2 diabetes mellitus without complication, without long-term current use of insulin (CMS/[facility])  -     Hemoglobin A1c; Future  -     Comprehensive Metabolic Panel; Future    Hyperlipidemia LDL goal <70  -     Lipid Panel With LDL / HDL Ratio; Future    Other orders  -     isosorbide mononitrate (IMDUR) 30 MG 24 hr tablet; Take 30 mg by mouth.  -     glucose blood test strip; 1 each by Other route.  -     ranolazine (RANEXA) 500 MG 12 hr tablet; Take 500 mg by mouth.  -     rosuvastatin (CRESTOR) 40 MG tablet; Take 40 mg by mouth.  -     rosuvastatin (CRESTOR) 10 MG tablet; Take 1 tablet by mouth Daily.         Return in about 3 months (around 10/31/2018) for Recheck.    Shin Melara M.D  07/31/18  3:06 PM

## 2018-08-01 DIAGNOSIS — E03.4 HYPOTHYROIDISM DUE TO ACQUIRED ATROPHY OF THYROID: ICD-10-CM

## 2018-08-01 DIAGNOSIS — I10 ESSENTIAL HYPERTENSION: ICD-10-CM

## 2018-08-01 DIAGNOSIS — E78.5 HYPERLIPIDEMIA LDL GOAL <70: ICD-10-CM

## 2018-08-01 DIAGNOSIS — E11.9 TYPE 2 DIABETES MELLITUS WITHOUT COMPLICATION, UNSPECIFIED LONG TERM INSULIN USE STATUS: ICD-10-CM

## 2018-08-01 DIAGNOSIS — E11.9 TYPE 2 DIABETES MELLITUS WITHOUT COMPLICATION, WITHOUT LONG-TERM CURRENT USE OF INSULIN (HCC): Primary | ICD-10-CM

## 2018-08-05 ENCOUNTER — RESULTS ENCOUNTER (OUTPATIENT)
Dept: INTERNAL MEDICINE | Facility: CLINIC | Age: 68
End: 2018-08-05

## 2018-08-05 DIAGNOSIS — E78.5 HYPERLIPIDEMIA LDL GOAL <70: ICD-10-CM

## 2018-08-05 DIAGNOSIS — E11.9 TYPE 2 DIABETES MELLITUS WITHOUT COMPLICATION, WITHOUT LONG-TERM CURRENT USE OF INSULIN (HCC): ICD-10-CM

## 2018-10-15 RX ORDER — LEVOTHYROXINE SODIUM 0.05 MG/1
TABLET ORAL
Qty: 90 TABLET | Refills: 3 | Status: SHIPPED | OUTPATIENT
Start: 2018-10-15 | End: 2019-10-16 | Stop reason: SDUPTHER

## 2018-12-11 LAB
ALBUMIN SERPL-MCNC: 4.5 G/DL (ref 3.5–5.2)
ALBUMIN/GLOB SERPL: 1.7 G/DL
ALP SERPL-CCNC: 71 U/L (ref 39–117)
ALT SERPL-CCNC: 22 U/L (ref 1–41)
AST SERPL-CCNC: 16 U/L (ref 1–40)
BILIRUB SERPL-MCNC: 0.6 MG/DL (ref 0.1–1.2)
BUN SERPL-MCNC: 30 MG/DL (ref 8–23)
BUN/CREAT SERPL: 31.3 (ref 7–25)
CALCIUM SERPL-MCNC: 9.4 MG/DL (ref 8.6–10.5)
CHLORIDE SERPL-SCNC: 102 MMOL/L (ref 98–107)
CHOLEST SERPL-MCNC: 155 MG/DL (ref 0–200)
CO2 SERPL-SCNC: 25.3 MMOL/L (ref 22–29)
CREAT SERPL-MCNC: 0.96 MG/DL (ref 0.76–1.27)
GLOBULIN SER CALC-MCNC: 2.6 GM/DL
GLUCOSE SERPL-MCNC: 148 MG/DL (ref 65–99)
HBA1C MFR BLD: 7.3 % (ref 4.8–5.6)
HDLC SERPL-MCNC: 49 MG/DL (ref 40–60)
LDLC SERPL CALC-MCNC: 76 MG/DL (ref 0–100)
LDLC/HDLC SERPL: 1.56 {RATIO}
POTASSIUM SERPL-SCNC: 4.5 MMOL/L (ref 3.5–5.2)
PROT SERPL-MCNC: 7.1 G/DL (ref 6–8.5)
SODIUM SERPL-SCNC: 140 MMOL/L (ref 136–145)
TRIGL SERPL-MCNC: 148 MG/DL (ref 0–150)
VLDLC SERPL CALC-MCNC: 29.6 MG/DL (ref 5–40)

## 2018-12-18 ENCOUNTER — OFFICE VISIT (OUTPATIENT)
Dept: INTERNAL MEDICINE | Facility: CLINIC | Age: 68
End: 2018-12-18

## 2018-12-18 VITALS
HEIGHT: 67 IN | HEART RATE: 70 BPM | OXYGEN SATURATION: 98 % | SYSTOLIC BLOOD PRESSURE: 134 MMHG | DIASTOLIC BLOOD PRESSURE: 66 MMHG | BODY MASS INDEX: 26.65 KG/M2 | WEIGHT: 169.8 LBS | TEMPERATURE: 97.7 F

## 2018-12-18 DIAGNOSIS — E78.5 HYPERLIPIDEMIA LDL GOAL <70: ICD-10-CM

## 2018-12-18 DIAGNOSIS — N52.01 ERECTILE DYSFUNCTION DUE TO ARTERIAL INSUFFICIENCY: ICD-10-CM

## 2018-12-18 DIAGNOSIS — Z00.00 ANNUAL PHYSICAL EXAM: ICD-10-CM

## 2018-12-18 DIAGNOSIS — Z87.891 HISTORY OF TOBACCO ABUSE: ICD-10-CM

## 2018-12-18 DIAGNOSIS — Z12.5 PROSTATE CANCER SCREENING: Primary | ICD-10-CM

## 2018-12-18 DIAGNOSIS — E11.9 TYPE 2 DIABETES MELLITUS WITHOUT COMPLICATION, WITHOUT LONG-TERM CURRENT USE OF INSULIN (HCC): ICD-10-CM

## 2018-12-18 DIAGNOSIS — T14.8XXA MUSCLE STRAIN: ICD-10-CM

## 2018-12-18 DIAGNOSIS — Z79.899 ENCOUNTER FOR LONG-TERM (CURRENT) DRUG USE: ICD-10-CM

## 2018-12-18 DIAGNOSIS — Z86.79 HISTORY OF ASCVD: ICD-10-CM

## 2018-12-18 LAB
BILIRUB BLD-MCNC: NEGATIVE MG/DL
CLARITY, POC: CLEAR
COLOR UR: YELLOW
GLUCOSE UR STRIP-MCNC: ABNORMAL MG/DL
KETONES UR QL: NEGATIVE
LEUKOCYTE EST, POC: NEGATIVE
Lab: NORMAL
NITRITE UR-MCNC: NEGATIVE MG/ML
PH UR: 5 [PH] (ref 5–8)
PROT UR STRIP-MCNC: NEGATIVE MG/DL
PSA SERPL-MCNC: 0.9 NG/ML (ref 0–4)
RBC # UR STRIP: ABNORMAL /UL
SP GR UR: 1.02 (ref 1–1.03)
UROBILINOGEN UR QL: NORMAL
WRITTEN AUTHORIZATION: NORMAL

## 2018-12-18 PROCEDURE — 81003 URINALYSIS AUTO W/O SCOPE: CPT | Performed by: FAMILY MEDICINE

## 2018-12-18 PROCEDURE — 99214 OFFICE O/P EST MOD 30 MIN: CPT | Performed by: FAMILY MEDICINE

## 2018-12-18 PROCEDURE — 93000 ELECTROCARDIOGRAM COMPLETE: CPT | Performed by: FAMILY MEDICINE

## 2018-12-18 NOTE — PROGRESS NOTES
CC:PE  2) bilat  cva pain  2weeks-h  Subjective.../HPI  Patient present today with1) cva pain 2 weeks-hurts when bend over,no gu pain    I have reviewed the patient's medical history in detail and updated the computerized patient record.  All meds and associated meds reviewed  Past Medical History:   Diagnosis Date   • Diabetes mellitus (CMS/HCC)    • Hyperlipidemia    • Hypertension    • Hypothyroidism        Past Surgical History:   Procedure Laterality Date   • CARDIAC SURGERY     • SKIN BIOPSY      shoulder left       Family History   Problem Relation Age of Onset   • Cancer Mother         skin   • COPD Mother    • Hyperlipidemia Mother    • Hypertension Mother    • Cancer Father         lung   • Diabetes Father    • Heart disease Father    • Hyperlipidemia Father    • Hypertension Father    • Early death Brother         50   • Alcohol abuse Brother        Social History     Socioeconomic History   • Marital status: Single     Spouse name: Not on file   • Number of children: Not on file   • Years of education: Not on file   • Highest education level: Not on file   Social Needs   • Financial resource strain: Not on file   • Food insecurity - worry: Not on file   • Food insecurity - inability: Not on file   • Transportation needs - medical: Not on file   • Transportation needs - non-medical: Not on file   Occupational History   • Not on file   Tobacco Use   • Smoking status: Never Smoker   • Smokeless tobacco: Never Used   Substance and Sexual Activity   • Alcohol use: Yes     Alcohol/week: 4.2 oz     Types: 7 Shots of liquor per week   • Drug use: Not on file   • Sexual activity: Defer   Other Topics Concern   • Not on file   Social History Narrative   • Not on file       Most Recent Immunizations   Administered Date(s) Administered   • Flu Vaccine High Dose PF 65YR+ 01/25/2018       Review of Systems:   Review of Systems   Constitutional: Negative.    HENT: Positive for postnasal drip.    Eyes: Negative.   "  Respiratory: Negative.    Cardiovascular: Negative.    Endocrine: Negative.    Genitourinary: Positive for frequency.   Musculoskeletal: Positive for back pain.   Skin: Positive for rash.        Itchy bumps on chest   Allergic/Immunologic: Positive for environmental allergies.   Neurological: Negative.    Hematological: Negative.    Psychiatric/Behavioral: Negative.          Physical Exam   Constitutional: He is oriented to person, place, and time. He appears well-developed and well-nourished.   HENT:   Head: Normocephalic and atraumatic.   Right Ear: External ear normal.   Left Ear: External ear normal.   Nose: Nose normal.   Eyes: EOM are normal. Pupils are equal, round, and reactive to light. Right eye exhibits no discharge. Left eye exhibits no discharge. No scleral icterus.   Neck: Normal range of motion. Neck supple. No JVD present. No tracheal deviation present. No thyromegaly present.   Cardiovascular: Normal rate, regular rhythm, normal heart sounds and intact distal pulses.   Pulmonary/Chest: Effort normal and breath sounds normal. He exhibits no tenderness.   Abdominal: Soft. Bowel sounds are normal.   Diathesis recti   Genitourinary: Rectum normal, prostate normal and penis normal. No penile tenderness.   Musculoskeletal: Normal range of motion. He exhibits no deformity.   Lymphadenopathy:     He has no cervical adenopathy.   Neurological: He is oriented to person, place, and time.   Skin: Skin is warm.   Many seborrheic keratosis   Psychiatric: He has a normal mood and affect. His behavior is normal. Judgment and thought content normal.   Vitals reviewed.        Vital Signs     Vitals:    12/18/18 0849   BP: 134/66   BP Location: Left arm   Patient Position: Sitting   Cuff Size: Small Adult   Pulse: 70   Temp: 97.7 °F (36.5 °C)   TempSrc: Oral   SpO2: 98%   Weight: 77 kg (169 lb 12.8 oz)   Height: 170.2 cm (67.01\")          Results Review:      REVIEWED AND DISCUSSED LAB RESULTS WITH " PATIENT      Requested Prescriptions     Signed Prescriptions Disp Refills   • metFORMIN (GLUCOPHAGE) 1000 MG tablet 180 tablet 3     Sig: Take 1 tablet by mouth 2 (Two) Times a Day With Meals.         Current Outpatient Medications:   •  aspirin 81 MG tablet, Take 81 mg by mouth daily., Disp: , Rfl:   •  clopidogrel (PLAVIX) 75 MG tablet, Take 1 tablet by mouth daily., Disp: 90 tablet, Rfl: 1  •  finasteride (PROSCAR) 5 MG tablet, Take 1 tablet by mouth Daily., Disp: 30 tablet, Rfl: 11  •  glucose blood test strip, DX: E11.9 tests blood sugar twice daily using One Touch system, Disp: 100 each, Rfl: 11  •  glucose blood test strip, 1 each by Other route., Disp: , Rfl:   •  INVOKANA 300 MG tablet, TAKE 1 TABLET BY MOUTH ONCE DAILY, Disp: 30 tablet, Rfl: 1  •  irbesartan (AVAPRO) 300 MG tablet, Take 1 tablet by mouth Daily., Disp: 90 tablet, Rfl: 3  •  isosorbide mononitrate (IMDUR) 30 MG 24 hr tablet, Take 30 mg by mouth., Disp: , Rfl:   •  levothyroxine (SYNTHROID, LEVOTHROID) 50 MCG tablet, TAKE 1 TABLET BY MOUTH ONCE DAILY, Disp: 90 tablet, Rfl: 3  •  metFORMIN (GLUCOPHAGE) 500 MG tablet, Take 1 tablet by mouth 3 (Three) Times a Day., Disp: 90 tablet, Rfl: 11  •  NITROSTAT 0.4 MG SL tablet, DISSOLVE ONE TABLET UNDER THE TONGUE AS NEEDED, Disp: 25 tablet, Rfl: 0  •  pravastatin (PRAVACHOL) 80 MG tablet, TAKE ONE TABLET BY MOUTH ONCE DAILY, Disp: 90 tablet, Rfl: 0  •  ranolazine (RANEXA) 500 MG 12 hr tablet, Take 500 mg by mouth., Disp: , Rfl:   •  sitaGLIPtin (JANUVIA) 100 MG tablet, Take 1 tablet by mouth daily., Disp: 60 tablet, Rfl: 0  •  ALPRAZolam (XANAX) 0.5 MG tablet, Take 1 tablet by mouth 2 (Two) Times a Day As Needed for Anxiety., Disp: 60 tablet, Rfl: 0  •  metFORMIN (GLUCOPHAGE) 1000 MG tablet, Take 1 tablet by mouth 2 (Two) Times a Day With Meals., Disp: 180 tablet, Rfl: 3    Procedures          Diagnoses and all orders for this visit:    Prostate cancer screening  -     PSA Screen    History of  ASCVD  -     ECG 12 Lead    Erectile dysfunction due to arterial insufficiency    Annual physical exam  -     POCT urinalysis dipstick, multipro  -     ECG 12 Lead    Encounter for long-term (current) drug use  -     CBC & Differential; Future  -     ECG 12 Lead    Type 2 diabetes mellitus without complication, without long-term current use of insulin (CMS/[facility])  -     Hemoglobin A1c; Future  -     ECG 12 Lead    Hyperlipidemia LDL goal <70  -     Comprehensive Metabolic Panel; Future  -     Lipid Panel With LDL / HDL Ratio; Future  -     ECG 12 Lead    History of tobacco abuse  -     CT Chest With Contrast; Future  -     ECG 12 Lead    Muscle strain    Other orders  -     Cancel: CT Chest With Contrast; Future  -     metFORMIN (GLUCOPHAGE) 1000 MG tablet; Take 1 tablet by mouth 2 (Two) Times a Day With Meals.         Return in about 6 months (around 6/18/2019).    Shin Melara M.D  01/03/19  7:47 PM

## 2018-12-23 ENCOUNTER — RESULTS ENCOUNTER (OUTPATIENT)
Dept: INTERNAL MEDICINE | Facility: CLINIC | Age: 68
End: 2018-12-23

## 2018-12-23 DIAGNOSIS — E11.9 TYPE 2 DIABETES MELLITUS WITHOUT COMPLICATION, WITHOUT LONG-TERM CURRENT USE OF INSULIN (HCC): ICD-10-CM

## 2018-12-23 DIAGNOSIS — Z79.899 ENCOUNTER FOR LONG-TERM (CURRENT) DRUG USE: ICD-10-CM

## 2018-12-23 DIAGNOSIS — E78.5 HYPERLIPIDEMIA LDL GOAL <70: ICD-10-CM

## 2018-12-27 ENCOUNTER — RESULTS ENCOUNTER (OUTPATIENT)
Dept: INTERNAL MEDICINE | Facility: CLINIC | Age: 68
End: 2018-12-27

## 2018-12-27 ENCOUNTER — HOSPITAL ENCOUNTER (OUTPATIENT)
Dept: CT IMAGING | Facility: HOSPITAL | Age: 68
Discharge: HOME | End: 2018-12-27
Attending: FAMILY MEDICINE | Admitting: FAMILY MEDICINE

## 2018-12-27 DIAGNOSIS — R31.21 ASYMPTOMATIC MICROSCOPIC HEMATURIA: ICD-10-CM

## 2018-12-27 DIAGNOSIS — F41.9 ANXIETY: ICD-10-CM

## 2018-12-27 DIAGNOSIS — R31.21 ASYMPTOMATIC MICROSCOPIC HEMATURIA: Primary | ICD-10-CM

## 2018-12-27 DIAGNOSIS — Z87.891 HISTORY OF TOBACCO ABUSE: ICD-10-CM

## 2018-12-27 LAB — CREAT BLDA-MCNC: 1.1 MG/DL (ref 0.6–1.3)

## 2018-12-27 PROCEDURE — 25010000002 IOPAMIDOL 61 % SOLUTION: Performed by: FAMILY MEDICINE

## 2018-12-27 PROCEDURE — 71260 CT THORAX DX C+: CPT

## 2018-12-27 PROCEDURE — 82565 ASSAY OF CREATININE: CPT

## 2018-12-27 RX ORDER — ALPRAZOLAM 0.5 MG/1
0.5 TABLET ORAL 2 TIMES DAILY PRN
Qty: 60 TABLET | Refills: 0 | Status: SHIPPED | OUTPATIENT
Start: 2018-12-27 | End: 2019-01-31 | Stop reason: SDUPTHER

## 2018-12-27 RX ADMIN — IOPAMIDOL 85 ML: 612 INJECTION, SOLUTION INTRAVENOUS at 15:07

## 2019-01-03 PROBLEM — T14.8XXA MUSCLE STRAIN: Status: ACTIVE | Noted: 2019-01-03

## 2019-01-22 DIAGNOSIS — N52.01 ERECTILE DYSFUNCTION DUE TO ARTERIAL INSUFFICIENCY: Primary | ICD-10-CM

## 2019-01-30 ENCOUNTER — OFFICE VISIT (OUTPATIENT)
Dept: CARDIOLOGY | Facility: CLINIC | Age: 69
End: 2019-01-30

## 2019-01-30 VITALS
SYSTOLIC BLOOD PRESSURE: 142 MMHG | HEART RATE: 74 BPM | HEIGHT: 67 IN | WEIGHT: 168 LBS | BODY MASS INDEX: 26.37 KG/M2 | DIASTOLIC BLOOD PRESSURE: 80 MMHG

## 2019-01-30 DIAGNOSIS — I25.118 CORONARY ARTERY DISEASE OF NATIVE ARTERY OF NATIVE HEART WITH STABLE ANGINA PECTORIS (HCC): Primary | ICD-10-CM

## 2019-01-30 PROCEDURE — 93000 ELECTROCARDIOGRAM COMPLETE: CPT | Performed by: INTERNAL MEDICINE

## 2019-01-30 PROCEDURE — 99204 OFFICE O/P NEW MOD 45 MIN: CPT | Performed by: INTERNAL MEDICINE

## 2019-01-30 RX ORDER — ISOSORBIDE DINITRATE 30 MG/1
30 TABLET ORAL 2 TIMES DAILY
COMMUNITY
End: 2019-08-20 | Stop reason: DRUGHIGH

## 2019-01-30 RX ORDER — ROSUVASTATIN CALCIUM 10 MG/1
10 TABLET, COATED ORAL DAILY
COMMUNITY
End: 2020-02-11

## 2019-01-30 NOTE — PROGRESS NOTES
Subjective:     Encounter Date:01/30/2019      Patient ID: Jesus Cuba is a 68 y.o. male.    Chief Complaint: CAD, angina    History of Present Illness    Dr. Melara,    I had the pleasure of seeing Jesus Cuba in cardiac evaluation today. As you well know, he is a lorna 68-year-old man who with history of coronary artery disease status post coronary intervention. He was previously a patient of Dr. Curiel but has decided to establish care with me. He has had multiple angioplasties. Most recently he had small vessel intervention of his diagonal branch. He has some residual small vessel disease that is being treated medically.     He decided to come in to see me for 2nd opinion regarding his heart disease. He has several types of discomfort. He has a pinpoint chest discomfort in his left chest that he says got better in the past after coronary intervention. He says now he still has this discomfort despite his recent stent. He also says that he has trouble with dyspnea and chest tightness that has gotten better since his coronary procedures.     He has multiple cardiac risk factors including diabetes, hypertension, and hyperlipidemia. He is a former smoker but quit many years ago.         Review of Systems   All other systems reviewed and are negative.    Family History   Problem Relation Age of Onset   • Cancer Mother         skin   • COPD Mother    • Hyperlipidemia Mother    • Hypertension Mother    • Cancer Father         lung   • Diabetes Father    • Heart disease Father    • Hyperlipidemia Father    • Hypertension Father    • Early death Brother         50   • Alcohol abuse Brother    • Colon cancer Neg Hx    • Colon polyps Neg Hx      Social History     Tobacco Use   • Smoking status: Never Smoker   • Smokeless tobacco: Never Used   Substance Use Topics   • Alcohol use: Yes     Alcohol/week: 4.2 oz     Types: 7 Shots of liquor per week   • Drug use: Not on file           ECG 12 Lead  Date/Time:  1/30/2019 2:02 PM  Performed by: Ben Tobar MD  Authorized by: Ben Tobar MD   Comparison: compared with previous ECG   Similar to previous ECG  Rhythm: sinus rhythm  BPM: 74  QRS axis: left  Other findings: PRWP               Objective:     Physical Exam   Constitutional: He is oriented to person, place, and time. He appears well-developed and well-nourished.   HENT:   Head: Normocephalic and atraumatic.   Neck: Normal range of motion. Neck supple.   Cardiovascular: Normal rate, regular rhythm and normal heart sounds.   Pulmonary/Chest: Effort normal and breath sounds normal.   Abdominal: Soft. Bowel sounds are normal.   Musculoskeletal: Normal range of motion.   Neurological: He is alert and oriented to person, place, and time.   Skin: Skin is warm and dry.   Psychiatric: He has a normal mood and affect. His behavior is normal. Thought content normal.   Vitals reviewed.      Lab Review:       Assessment:          Diagnosis Plan   1. Coronary artery disease of native artery of native heart with stable angina pectoris (CMS/[facility])            Plan:       It was a pleasure to see your patient in cardiac followup today. He is doing well from cardiac standpoint without any complaints. He has chronic class 1 angina. I think this should best be treated  Medically given that it is small vessel disease. I have started him on metoprolol as well as added Ranexa to his regimen that already includes Imdur. I have also instructed him on how to use sublingual nitroglycerin.     I have urged him to try to exercise on a regular basis and control his cardiac risk factors, especially diabetes.    I do think he has a musculoskeletal component to some of his discomfort. This can be treated with Tylenol. He will see me again in 6 months or sooner if symptoms warrant.       Coronary Artery Disease  Assessment  • The patient has CCS class I - angina only during strenuous or prolonged physical activity  • The patient has stable angina      Plan  • Lifestyle modifications discussed include adhering to a heart healthy diet, avoidance of tobacco products, maintenance of a healthy weight, medication compliance, regular exercise and regular monitoring of cholesterol and blood pressure    Subjective - Objective  • There has been a previous stent procedure using COREY  • Current antiplatelet therapy includes aspirin 81 mg and clopidogrel 75 mg

## 2019-01-31 DIAGNOSIS — F41.9 ANXIETY: ICD-10-CM

## 2019-02-06 RX ORDER — ALPRAZOLAM 0.5 MG/1
TABLET ORAL
Qty: 60 TABLET | Refills: 0 | Status: SHIPPED | OUTPATIENT
Start: 2019-02-06 | End: 2019-05-30 | Stop reason: SDUPTHER

## 2019-03-05 ENCOUNTER — TELEPHONE (OUTPATIENT)
Dept: INTERNAL MEDICINE | Facility: CLINIC | Age: 69
End: 2019-03-05

## 2019-03-05 NOTE — TELEPHONE ENCOUNTER
Pt called states would like for Dr. Melara to call him pertaining to Invokana. He stated the medication is to expensive and he would like to see if he could get something else that is a little cheaper and works just as well. Please advise

## 2019-03-12 RX ORDER — PRAVASTATIN SODIUM 80 MG/1
TABLET ORAL
Qty: 90 TABLET | Refills: 1 | Status: SHIPPED | OUTPATIENT
Start: 2019-03-12 | End: 2019-09-15 | Stop reason: SDUPTHER

## 2019-04-09 ENCOUNTER — TELEPHONE (OUTPATIENT)
Dept: INTERNAL MEDICINE | Facility: CLINIC | Age: 69
End: 2019-04-09

## 2019-05-14 RX ORDER — RANOLAZINE 500 MG/1
500 TABLET, EXTENDED RELEASE ORAL 2 TIMES DAILY
Qty: 180 TABLET | Refills: 3 | Status: SHIPPED | OUTPATIENT
Start: 2019-05-14 | End: 2019-05-20 | Stop reason: SDUPTHER

## 2019-05-20 ENCOUNTER — TELEPHONE (OUTPATIENT)
Dept: CARDIOLOGY | Facility: CLINIC | Age: 69
End: 2019-05-20

## 2019-05-20 RX ORDER — RANOLAZINE 500 MG/1
500 TABLET, EXTENDED RELEASE ORAL 2 TIMES DAILY
Qty: 180 TABLET | Refills: 1 | Status: SHIPPED | OUTPATIENT
Start: 2019-05-20 | End: 2020-01-08 | Stop reason: SDUPTHER

## 2019-05-20 NOTE — TELEPHONE ENCOUNTER
[phone]    Pt called stating he is going to order his Ranexa through [facility].  He states he needs the rx emailed to him at [email or web address].     I informed pt we do not email, so I printed rx and left at desk for pt to .  Thanks, C MUNDO

## 2019-05-30 DIAGNOSIS — F41.9 ANXIETY: ICD-10-CM

## 2019-06-04 RX ORDER — ALPRAZOLAM 0.5 MG/1
TABLET ORAL
Qty: 60 TABLET | Refills: 0 | Status: SHIPPED | OUTPATIENT
Start: 2019-06-04 | End: 2019-07-02 | Stop reason: SDUPTHER

## 2019-06-11 LAB
ALBUMIN SERPL-MCNC: 4.5 G/DL (ref 3.5–5.2)
ALBUMIN/GLOB SERPL: 1.8 G/DL
ALP SERPL-CCNC: 76 U/L (ref 39–117)
ALT SERPL-CCNC: 14 U/L (ref 1–41)
AST SERPL-CCNC: 9 U/L (ref 1–40)
BASOPHILS # BLD AUTO: 0.08 10*3/MM3 (ref 0–0.2)
BASOPHILS NFR BLD AUTO: 1.1 % (ref 0–1.5)
BILIRUB SERPL-MCNC: 0.5 MG/DL (ref 0.2–1.2)
BUN SERPL-MCNC: 24 MG/DL (ref 8–23)
BUN/CREAT SERPL: 24 (ref 7–25)
CALCIUM SERPL-MCNC: 9.2 MG/DL (ref 8.6–10.5)
CHLORIDE SERPL-SCNC: 104 MMOL/L (ref 98–107)
CHOLEST SERPL-MCNC: 186 MG/DL (ref 0–200)
CO2 SERPL-SCNC: 25.5 MMOL/L (ref 22–29)
CREAT SERPL-MCNC: 1 MG/DL (ref 0.76–1.27)
EOSINOPHIL # BLD AUTO: 0.28 10*3/MM3 (ref 0–0.4)
EOSINOPHIL NFR BLD AUTO: 3.8 % (ref 0.3–6.2)
ERYTHROCYTE [DISTWIDTH] IN BLOOD BY AUTOMATED COUNT: 13.4 % (ref 12.3–15.4)
GLOBULIN SER CALC-MCNC: 2.5 GM/DL
GLUCOSE SERPL-MCNC: 166 MG/DL (ref 65–99)
HBA1C MFR BLD: 7.4 % (ref 4.8–5.6)
HCT VFR BLD AUTO: 45.8 % (ref 37.5–51)
HDLC SERPL-MCNC: 43 MG/DL (ref 40–60)
HGB BLD-MCNC: 14.6 G/DL (ref 13–17.7)
IMM GRANULOCYTES # BLD AUTO: 0.01 10*3/MM3 (ref 0–0.05)
IMM GRANULOCYTES NFR BLD AUTO: 0.1 % (ref 0–0.5)
LDLC SERPL CALC-MCNC: 70 MG/DL (ref 0–100)
LDLC/HDLC SERPL: 1.63 {RATIO}
LYMPHOCYTES # BLD AUTO: 2.35 10*3/MM3 (ref 0.7–3.1)
LYMPHOCYTES NFR BLD AUTO: 31.8 % (ref 19.6–45.3)
MCH RBC QN AUTO: 29 PG (ref 26.6–33)
MCHC RBC AUTO-ENTMCNC: 31.9 G/DL (ref 31.5–35.7)
MCV RBC AUTO: 90.9 FL (ref 79–97)
MONOCYTES # BLD AUTO: 0.79 10*3/MM3 (ref 0.1–0.9)
MONOCYTES NFR BLD AUTO: 10.7 % (ref 5–12)
NEUTROPHILS # BLD AUTO: 3.87 10*3/MM3 (ref 1.7–7)
NEUTROPHILS NFR BLD AUTO: 52.5 % (ref 42.7–76)
NRBC BLD AUTO-RTO: 0 /100 WBC (ref 0–0.2)
PLATELET # BLD AUTO: 213 10*3/MM3 (ref 140–450)
POTASSIUM SERPL-SCNC: 4.4 MMOL/L (ref 3.5–5.2)
PROT SERPL-MCNC: 7 G/DL (ref 6–8.5)
RBC # BLD AUTO: 5.04 10*6/MM3 (ref 4.14–5.8)
SODIUM SERPL-SCNC: 141 MMOL/L (ref 136–145)
TRIGL SERPL-MCNC: 364 MG/DL (ref 0–150)
VLDLC SERPL CALC-MCNC: 72.8 MG/DL
WBC # BLD AUTO: 7.38 10*3/MM3 (ref 3.4–10.8)

## 2019-06-18 ENCOUNTER — OFFICE VISIT (OUTPATIENT)
Dept: INTERNAL MEDICINE | Facility: CLINIC | Age: 69
End: 2019-06-18

## 2019-06-18 VITALS
BODY MASS INDEX: 26.68 KG/M2 | OXYGEN SATURATION: 98 % | DIASTOLIC BLOOD PRESSURE: 72 MMHG | HEIGHT: 67 IN | HEART RATE: 60 BPM | WEIGHT: 170 LBS | SYSTOLIC BLOOD PRESSURE: 127 MMHG | TEMPERATURE: 97.8 F

## 2019-06-18 DIAGNOSIS — E11.9 TYPE 2 DIABETES MELLITUS WITHOUT COMPLICATION, WITHOUT LONG-TERM CURRENT USE OF INSULIN (HCC): ICD-10-CM

## 2019-06-18 DIAGNOSIS — N52.01 ERECTILE DYSFUNCTION DUE TO ARTERIAL INSUFFICIENCY: ICD-10-CM

## 2019-06-18 DIAGNOSIS — E78.5 HYPERLIPIDEMIA LDL GOAL <70: ICD-10-CM

## 2019-06-18 DIAGNOSIS — I10 ESSENTIAL HYPERTENSION: Primary | ICD-10-CM

## 2019-06-18 PROCEDURE — 99213 OFFICE O/P EST LOW 20 MIN: CPT | Performed by: FAMILY MEDICINE

## 2019-06-18 NOTE — PROGRESS NOTES
CC:hyperlipidemia,DM,HTN    Subjective.../HPI  Patient present today with1)HTN Jesus has a history of chronic hypertension and has been well controlled on current medications.  Patient reports has had hypertension for 20 years. He is tolerating medications without side effect. He reports no vision changes, headaches or lightheadedness. He is requesting refills of medications  2) DM see blood  3)lipids- Jesus has a history of chronic hyperlipidemia and has been well controlled on current medications.  Patient reports has had hyperlipidemia for 20 years. He is tolerating medications without side effect.  Hyperlipidemia labs will be reviewed today.    I have reviewed the patient's medical history in detail and updated the computerized patient record.    Past Medical History:   Diagnosis Date   • CAD (coronary artery disease)    • Diabetes mellitus (CMS/HCC)    • Dyslipidemia    • Hyperlipidemia    • Hypertension    • Hypothyroidism    • Myocardial infarction (CMS/HCC)        Past Surgical History:   Procedure Laterality Date   • CARDIAC CATHETERIZATION  07/03/2018   • CARDIAC SURGERY     • SKIN BIOPSY      shoulder left       Family History   Problem Relation Age of Onset   • Cancer Mother         skin   • COPD Mother    • Hyperlipidemia Mother    • Hypertension Mother    • Cancer Father         lung   • Diabetes Father    • Heart disease Father    • Hyperlipidemia Father    • Hypertension Father    • Early death Brother         50   • Alcohol abuse Brother    • Colon cancer Neg Hx    • Colon polyps Neg Hx        Social History     Socioeconomic History   • Marital status: Single     Spouse name: Not on file   • Number of children: Not on file   • Years of education: Not on file   • Highest education level: Not on file   Tobacco Use   • Smoking status: Never Smoker   • Smokeless tobacco: Never Used   Substance and Sexual Activity   • Alcohol use: Yes     Alcohol/week: 4.2 oz     Types: 7 Shots of liquor per week   •  "Sexual activity: Defer       Most Recent Immunizations   Administered Date(s) Administered   • Flu Vaccine High Dose PF 65YR+ 01/25/2018       Review of Systems:   Review of Systems   Constitutional: Negative.    HENT: Negative.    Eyes: Negative.    Respiratory: Negative.    Cardiovascular: Negative.    Gastrointestinal: Negative.    Endocrine: Negative.    Genitourinary: Negative.         ED   Musculoskeletal: Negative.    Allergic/Immunologic: Negative.    Neurological: Negative.    Hematological: Negative.          Physical Exam   Constitutional: He is oriented to person, place, and time. He appears well-developed and well-nourished.   Cardiovascular: Normal rate, regular rhythm and normal heart sounds.   Pulmonary/Chest: Effort normal.   Neurological: He is alert and oriented to person, place, and time.   Psychiatric: He has a normal mood and affect. His behavior is normal. Thought content normal.   Vitals reviewed.        Vital Signs     Vitals:    06/18/19 0956   BP: 127/72   BP Location: Right arm   Patient Position: Sitting   Cuff Size: Small Adult   Pulse: 60   Temp: 97.8 °F (36.6 °C)   TempSrc: Oral   SpO2: 98%   Weight: 77.1 kg (170 lb)   Height: 170.2 cm (67\")          Results Review:      REVIEWED AND DISCUSSED LAB RESULTS WITH PATIENT and REVIEWED AND DISCUSSED CLINICAL RESULTS WITH PATIENT      Requested Prescriptions      No prescriptions requested or ordered in this encounter         Current Outpatient Medications:   •  ALPRAZolam (XANAX) 0.5 MG tablet, TAKE 1 TABLET BY MOUTH TWICE DAILY AS NEEDED FOR ANXIETY, Disp: 60 tablet, Rfl: 0  •  aspirin 81 MG tablet, Take 81 mg by mouth daily., Disp: , Rfl:   •  clopidogrel (PLAVIX) 75 MG tablet, Take 1 tablet by mouth daily., Disp: 90 tablet, Rfl: 1  •  glucose blood test strip, DX: E11.9 tests blood sugar twice daily using One Touch system, Disp: 100 each, Rfl: 11  •  irbesartan (AVAPRO) 300 MG tablet, Take 1 tablet by mouth Daily., Disp: 90 tablet, Rfl: " 3  •  levothyroxine (SYNTHROID, LEVOTHROID) 50 MCG tablet, TAKE 1 TABLET BY MOUTH ONCE DAILY, Disp: 90 tablet, Rfl: 3  •  metFORMIN (GLUCOPHAGE) 1000 MG tablet, Take 1 tablet by mouth 2 (Two) Times a Day With Meals., Disp: 180 tablet, Rfl: 3  •  metoprolol tartrate (LOPRESSOR) 25 MG tablet, Take 1 tablet by mouth 2 (Two) Times a Day., Disp: 180 tablet, Rfl: 3  •  NITROSTAT 0.4 MG SL tablet, DISSOLVE ONE TABLET UNDER THE TONGUE AS NEEDED, Disp: 25 tablet, Rfl: 0  •  pravastatin (PRAVACHOL) 80 MG tablet, TAKE ONE TABLET BY MOUTH ONCE DAILY, Disp: 90 tablet, Rfl: 1  •  ranolazine (RANEXA) 500 MG 12 hr tablet, Take 1 tablet by mouth 2 (Two) Times a Day., Disp: 180 tablet, Rfl: 1  •  sitaGLIPtin (JANUVIA) 100 MG tablet, Take 1 tablet by mouth daily., Disp: 60 tablet, Rfl: 0  •  Dapagliflozin Propanediol (FARXIGA) 10 MG tablet, Take 10 mg by mouth Daily., Disp: 30 tablet, Rfl: 6  •  isosorbide dinitrate (ISORDIL) 30 MG tablet, Take 30 mg by mouth 2 (Two) Times a Day., Disp: , Rfl:   •  rosuvastatin (CRESTOR) 10 MG tablet, Take 10 mg by mouth Daily., Disp: , Rfl:     Procedures          Diagnoses and all orders for this visit:    Essential hypertension    Erectile dysfunction due to arterial insufficiency    Hyperlipidemia LDL goal <70    Type 2 diabetes mellitus without complication, without long-term current use of insulin (CMS/[facility])    Other orders  -     Discontinue: glucose blood test strip; 1 each by Other route.        There are no Patient Instructions on file for this visit.     Return for Recheck.    Shin Melara M.D  06/18/19  11:28 AM

## 2019-06-18 NOTE — PROGRESS NOTES
CC:    Subjective.../HPI  Patient present today with    I have reviewed the patient's medical history in detail and updated the computerized patient record.    Past Medical History:   Diagnosis Date   • CAD (coronary artery disease)    • Diabetes mellitus (CMS/HCC)    • Dyslipidemia    • Hyperlipidemia    • Hypertension    • Hypothyroidism    • Myocardial infarction (CMS/HCC)        Past Surgical History:   Procedure Laterality Date   • CARDIAC CATHETERIZATION  07/03/2018   • CARDIAC SURGERY     • SKIN BIOPSY      shoulder left       Family History   Problem Relation Age of Onset   • Cancer Mother         skin   • COPD Mother    • Hyperlipidemia Mother    • Hypertension Mother    • Cancer Father         lung   • Diabetes Father    • Heart disease Father    • Hyperlipidemia Father    • Hypertension Father    • Early death Brother         50   • Alcohol abuse Brother    • Colon cancer Neg Hx    • Colon polyps Neg Hx        Social History     Socioeconomic History   • Marital status: Single     Spouse name: Not on file   • Number of children: Not on file   • Years of education: Not on file   • Highest education level: Not on file   Tobacco Use   • Smoking status: Never Smoker   • Smokeless tobacco: Never Used   Substance and Sexual Activity   • Alcohol use: Yes     Alcohol/week: 4.2 oz     Types: 7 Shots of liquor per week   • Sexual activity: Defer       Most Recent Immunizations   Administered Date(s) Administered   • Flu Vaccine High Dose PF 65YR+ 01/25/2018       Review of Systems:   Review of Systems   All other systems reviewed and are negative.        Physical Exam   Constitutional: He is oriented to person, place, and time. He appears well-developed and well-nourished.   Cardiovascular: Normal rate, regular rhythm and normal heart sounds.   Pulmonary/Chest: Effort normal and breath sounds normal.   Neurological: He is alert and oriented to person, place, and time.   Psychiatric: He has a normal mood and affect.  "  Vitals reviewed.        Vital Signs     Vitals:    06/18/19 0956   BP: 127/72   BP Location: Right arm   Patient Position: Sitting   Cuff Size: Small Adult   Pulse: 60   Temp: 97.8 °F (36.6 °C)   TempSrc: Oral   SpO2: 98%   Weight: 77.1 kg (170 lb)   Height: 170.2 cm (67\")          Results Review:      REVIEWED AND DISCUSSED CLINICAL RESULTS WITH PATIENT      Requested Prescriptions      No prescriptions requested or ordered in this encounter         Current Outpatient Medications:   •  ALPRAZolam (XANAX) 0.5 MG tablet, TAKE 1 TABLET BY MOUTH TWICE DAILY AS NEEDED FOR ANXIETY, Disp: 60 tablet, Rfl: 0  •  aspirin 81 MG tablet, Take 81 mg by mouth daily., Disp: , Rfl:   •  clopidogrel (PLAVIX) 75 MG tablet, Take 1 tablet by mouth daily., Disp: 90 tablet, Rfl: 1  •  glucose blood test strip, DX: E11.9 tests blood sugar twice daily using One Touch system, Disp: 100 each, Rfl: 11  •  irbesartan (AVAPRO) 300 MG tablet, Take 1 tablet by mouth Daily., Disp: 90 tablet, Rfl: 3  •  levothyroxine (SYNTHROID, LEVOTHROID) 50 MCG tablet, TAKE 1 TABLET BY MOUTH ONCE DAILY, Disp: 90 tablet, Rfl: 3  •  metFORMIN (GLUCOPHAGE) 1000 MG tablet, Take 1 tablet by mouth 2 (Two) Times a Day With Meals., Disp: 180 tablet, Rfl: 3  •  metoprolol tartrate (LOPRESSOR) 25 MG tablet, Take 1 tablet by mouth 2 (Two) Times a Day., Disp: 180 tablet, Rfl: 3  •  NITROSTAT 0.4 MG SL tablet, DISSOLVE ONE TABLET UNDER THE TONGUE AS NEEDED, Disp: 25 tablet, Rfl: 0  •  pravastatin (PRAVACHOL) 80 MG tablet, TAKE ONE TABLET BY MOUTH ONCE DAILY, Disp: 90 tablet, Rfl: 1  •  ranolazine (RANEXA) 500 MG 12 hr tablet, Take 1 tablet by mouth 2 (Two) Times a Day., Disp: 180 tablet, Rfl: 1  •  sitaGLIPtin (JANUVIA) 100 MG tablet, Take 1 tablet by mouth daily., Disp: 60 tablet, Rfl: 0  •  Dapagliflozin Propanediol (FARXIGA) 10 MG tablet, Take 10 mg by mouth Daily., Disp: 30 tablet, Rfl: 6  •  glucose blood test strip, 1 each by Other route., Disp: , Rfl:   •  glucose " blood test strip, 1 each by Other route., Disp: , Rfl:   •  isosorbide dinitrate (ISORDIL) 30 MG tablet, Take 30 mg by mouth 2 (Two) Times a Day., Disp: , Rfl:   •  rosuvastatin (CRESTOR) 10 MG tablet, Take 10 mg by mouth Daily., Disp: , Rfl:     Procedures          Diagnoses and all orders for this visit:    Essential hypertension    Erectile dysfunction due to arterial insufficiency    Hyperlipidemia LDL goal <70    Type 2 diabetes mellitus without complication, without long-term current use of insulin (CMS/[facility])    Other orders  -     glucose blood test strip; 1 each by Other route.        There are no Patient Instructions on file for this visit.     No Follow-up on file.    Shin Melara M.D  06/18/19  11:18 AM

## 2019-06-28 DIAGNOSIS — F41.9 ANXIETY: ICD-10-CM

## 2019-06-28 RX ORDER — ALPRAZOLAM 0.5 MG/1
TABLET ORAL
Qty: 60 TABLET | Refills: 0 | Status: CANCELLED | OUTPATIENT
Start: 2019-06-28

## 2019-07-02 DIAGNOSIS — F41.9 ANXIETY: ICD-10-CM

## 2019-07-02 RX ORDER — ALPRAZOLAM 0.5 MG/1
0.5 TABLET ORAL 2 TIMES DAILY PRN
Qty: 60 TABLET | Refills: 0 | Status: SHIPPED | OUTPATIENT
Start: 2019-07-02 | End: 2019-09-24 | Stop reason: SDUPTHER

## 2019-08-14 RX ORDER — IRBESARTAN 300 MG/1
TABLET ORAL
Qty: 90 TABLET | Refills: 3 | Status: SHIPPED | OUTPATIENT
Start: 2019-08-14 | End: 2020-05-18 | Stop reason: SDUPTHER

## 2019-08-15 ENCOUNTER — TELEPHONE (OUTPATIENT)
Dept: INTERNAL MEDICINE | Facility: CLINIC | Age: 69
End: 2019-08-15

## 2019-08-20 ENCOUNTER — OFFICE VISIT (OUTPATIENT)
Dept: CARDIOLOGY | Facility: CLINIC | Age: 69
End: 2019-08-20

## 2019-08-20 VITALS
SYSTOLIC BLOOD PRESSURE: 130 MMHG | DIASTOLIC BLOOD PRESSURE: 60 MMHG | HEART RATE: 65 BPM | BODY MASS INDEX: 26.68 KG/M2 | WEIGHT: 170 LBS | HEIGHT: 67 IN

## 2019-08-20 DIAGNOSIS — E78.5 HYPERLIPIDEMIA LDL GOAL <70: ICD-10-CM

## 2019-08-20 DIAGNOSIS — I25.10 CORONARY ARTERY DISEASE INVOLVING NATIVE CORONARY ARTERY OF NATIVE HEART WITHOUT ANGINA PECTORIS: Primary | ICD-10-CM

## 2019-08-20 PROCEDURE — 99214 OFFICE O/P EST MOD 30 MIN: CPT | Performed by: PHYSICIAN ASSISTANT

## 2019-08-20 PROCEDURE — 93000 ELECTROCARDIOGRAM COMPLETE: CPT | Performed by: PHYSICIAN ASSISTANT

## 2019-08-20 RX ORDER — ISOSORBIDE MONONITRATE 30 MG/1
30 TABLET, EXTENDED RELEASE ORAL DAILY
Refills: 1 | COMMUNITY
Start: 2019-08-06

## 2019-08-20 NOTE — PROGRESS NOTES
Date of Office Visit: 2019  Encounter Provider: YAMILA Luna  Place of Service: [facility] CARDIOLOGY  Patient Name: Jesus Cuba  :1950    Chief Complaint   Patient presents with   • Follow-up     6 month   • Coronary Artery Disease   :     HPI: Jesus Cuba is a 69 y.o. male, new to me, who presents today for follow-up.  Old records have been obtained and reviewed by me.  He is a patient with a history of coronary disease.  He used to follow with Dr. Curiel but established care with Dr. Tobar on 2019.  His last cardiac catheterization was in 2018.  This showed a severe branch vessel and small vessel disease.  At that time he underwent stenting of his first diagonal branch.  He had a normal left main, widely patent mid LAD stent, 30 to 40% proximal stenosis of the LAD prior to the previously placed stent, severe disease throughout the circumflex there was diffuse and up to 95%, a stent in the proximal LAD with moderate stenosis (around 50%) on either end of the stent, and a widely patent stent in 1 of the RCA branches that supplied a portion of the posterior lateral wall.  When he saw Dr. Tobar in January he was having chest discomfort despite his recent stent placement.  He also was having dyspnea and chest tightness prior to his stent placement and that had actually gotten better.  Dr. Tobar felt that he had chronic class I angina that was best treated medically given that it was small vessel disease.  He was Chucho taking isosorbide, so Dr. Tobar started him on metoprolol and Ranexa.  He also felt that there was a musculoskeletal component to his discomfort and recommended Tylenol for this.  He is here today for six-month follow-up.  In looking back through his labs, it appears that in  of this year his hemoglobin A1c was elevated at 7.4.  His LDL and HDL were well controlled, his triglycerides were elevated.   The past 6 months he is been doing really  well.  He has not had any more anginal pain since starting the metoprolol and the Ranexa.  He walks around 1.6 to 2 miles for exercise over at [facility].  He can do this without shortness of breath or angina.  He denies any palpitations, edema, dizziness, or syncope.  He really likes carbohydrates and knows that he needs to cut back.  He has not had any bleeding issues on aspirin and Plavix.      Past Medical History:   Diagnosis Date   • CAD (coronary artery disease)    • Diabetes mellitus (CMS/HCC)    • Dyslipidemia    • Hyperlipidemia    • Hypertension    • Hypothyroidism    • Myocardial infarction (CMS/HCC)        Past Surgical History:   Procedure Laterality Date   • CARDIAC CATHETERIZATION  07/03/2018   • CARDIAC SURGERY     • SKIN BIOPSY      shoulder left       Social History     Socioeconomic History   • Marital status: Single     Spouse name: Not on file   • Number of children: Not on file   • Years of education: Not on file   • Highest education level: Not on file   Tobacco Use   • Smoking status: Never Smoker   • Smokeless tobacco: Never Used   Substance and Sexual Activity   • Alcohol use: Yes     Alcohol/week: 4.2 oz     Types: 7 Shots of liquor per week   • Sexual activity: Defer       Family History   Problem Relation Age of Onset   • Cancer Mother         skin   • COPD Mother    • Hyperlipidemia Mother    • Hypertension Mother    • Cancer Father         lung   • Diabetes Father    • Heart disease Father    • Hyperlipidemia Father    • Hypertension Father    • Early death Brother         50   • Alcohol abuse Brother    • Colon cancer Neg Hx    • Colon polyps Neg Hx        Review of Systems   Constitution: Negative for chills, fever and malaise/fatigue.   Cardiovascular: Negative for chest pain, dyspnea on exertion, leg swelling, near-syncope, orthopnea, palpitations, paroxysmal nocturnal dyspnea and syncope.   Respiratory: Negative for cough and shortness of breath.    Musculoskeletal:  Negative for joint pain, joint swelling and myalgias.   Gastrointestinal: Negative for abdominal pain, diarrhea, melena, nausea and vomiting.   Genitourinary: Negative for frequency and hematuria.   Neurological: Negative for light-headedness, numbness, paresthesias and seizures.   Allergic/Immunologic: Negative.    All other systems reviewed and are negative.      No Known Allergies      Current Outpatient Medications:   •  ALPRAZolam (XANAX) 0.5 MG tablet, Take 1 tablet by mouth 2 (Two) Times a Day As Needed for Anxiety. for anxiety, Disp: 60 tablet, Rfl: 0  •  aspirin 81 MG tablet, Take 81 mg by mouth daily., Disp: , Rfl:   •  clopidogrel (PLAVIX) 75 MG tablet, Take 1 tablet by mouth daily., Disp: 90 tablet, Rfl: 1  •  Dapagliflozin Propanediol (FARXIGA) 10 MG tablet, Take 10 mg by mouth Daily., Disp: 30 tablet, Rfl: 6  •  glucose blood (ONE TOUCH ULTRA TEST) test strip, USE 1 STRIP TO CHECK GLUCOSE TWICE DAILY, Disp: 100 each, Rfl: 11  •  irbesartan (AVAPRO) 300 MG tablet, TAKE 1 TABLET BY MOUTH ONCE DAILY, Disp: 90 tablet, Rfl: 3  •  isosorbide mononitrate (IMDUR) 30 MG 24 hr tablet, Take 30 mg by mouth 2 (Two) Times a Day., Disp: , Rfl: 1  •  levothyroxine (SYNTHROID, LEVOTHROID) 50 MCG tablet, TAKE 1 TABLET BY MOUTH ONCE DAILY, Disp: 90 tablet, Rfl: 3  •  metFORMIN (GLUCOPHAGE) 1000 MG tablet, Take 1 tablet by mouth 2 (Two) Times a Day With Meals., Disp: 180 tablet, Rfl: 3  •  metoprolol tartrate (LOPRESSOR) 25 MG tablet, Take 1 tablet by mouth 2 (Two) Times a Day., Disp: 180 tablet, Rfl: 3  •  NITROSTAT 0.4 MG SL tablet, DISSOLVE ONE TABLET UNDER THE TONGUE AS NEEDED, Disp: 25 tablet, Rfl: 0  •  pravastatin (PRAVACHOL) 80 MG tablet, TAKE ONE TABLET BY MOUTH ONCE DAILY, Disp: 90 tablet, Rfl: 1  •  ranolazine (RANEXA) 500 MG 12 hr tablet, Take 1 tablet by mouth 2 (Two) Times a Day., Disp: 180 tablet, Rfl: 1  •  rosuvastatin (CRESTOR) 10 MG tablet, Take 10 mg by mouth Daily., Disp: , Rfl:   •  sitaGLIPtin  "(JANUVIA) 100 MG tablet, Take 1 tablet by mouth daily., Disp: 60 tablet, Rfl: 0      Objective:     Vitals:    08/20/19 0951 08/20/19 1007   BP: 126/60 130/60   BP Location: Right arm Left arm   Pulse: 65    Weight: 77.1 kg (170 lb)    Height: 170.2 cm (67\")      Body mass index is 26.63 kg/m².    PHYSICAL EXAM:    Physical Exam   Constitutional: He is oriented to person, place, and time. He appears well-developed and well-nourished. No distress.   HENT:   Head: Normocephalic and atraumatic.   Eyes: Pupils are equal, round, and reactive to light.   Neck: No JVD present. No thyromegaly present.   Cardiovascular: Normal rate, regular rhythm, normal heart sounds and intact distal pulses.   No murmur heard.  Pulmonary/Chest: Effort normal and breath sounds normal. No respiratory distress.   Abdominal: Soft. Bowel sounds are normal. He exhibits no distension. There is no splenomegaly or hepatomegaly. There is no tenderness.   Musculoskeletal: Normal range of motion. He exhibits no edema.   Neurological: He is alert and oriented to person, place, and time.   Skin: Skin is warm and dry. He is not diaphoretic. No erythema.   Psychiatric: He has a normal mood and affect. His behavior is normal. Judgment normal.         ECG 12 Lead  Date/Time: 8/20/2019 10:15 AM  Performed by: Elisabeth Webber PA  Authorized by: Elisabeth Webber PA   Comparison: compared with previous ECG from 1/30/2019  Similar to previous ECG  Rhythm: sinus rhythm  BPM: 65  T flattening: V4, V5, V6, II, III and aVF    Clinical impression: abnormal EKG  Comments: Indication: Coronary disease              Assessment:       Diagnosis Plan   1. Coronary artery disease involving native coronary artery of native heart without angina pectoris  ECG 12 Lead   2. Hyperlipidemia LDL goal <70  ECG 12 Lead     Orders Placed This Encounter   Procedures   • ECG 12 Lead     This order was created via procedure documentation          Plan:       1.  Coronary disease.  He " is on a good medical regimen.  He is no longer having angina since adding a beta-blocker and Ranexa.  He is able to exercise without difficulty.  He does have pain in his left upper chest wall area that is sometimes worse after he eats.  I do not think this is angina.  Continue current medical regimen.    2.  Hyperlipidemia.  His labs actually look pretty good with the exception of elevated triglycerides.  He is diabetic and he loves his carbohydrates.  I have asked him to try to cut back on this.    Overall he is stable and I am not going to make any changes from a cardiac standpoint.  He will follow-up with Dr. Sprague in 6 months or sooner if needed as a transference of care from Dr. Tobar.    As always, it has been a pleasure to participate in your patient's care.      Sincerely,         Elisabeth Webber PA-C

## 2019-09-16 RX ORDER — PRAVASTATIN SODIUM 80 MG/1
TABLET ORAL
Qty: 90 TABLET | Refills: 1 | Status: SHIPPED | OUTPATIENT
Start: 2019-09-16 | End: 2020-04-22 | Stop reason: SDUPTHER

## 2019-09-20 ENCOUNTER — TELEPHONE (OUTPATIENT)
Dept: INTERNAL MEDICINE | Facility: CLINIC | Age: 69
End: 2019-09-20

## 2019-09-24 DIAGNOSIS — F41.9 ANXIETY: ICD-10-CM

## 2019-09-24 RX ORDER — ALPRAZOLAM 0.5 MG/1
TABLET ORAL
Qty: 60 TABLET | Refills: 3 | Status: SHIPPED | OUTPATIENT
Start: 2019-09-24 | End: 2020-04-01 | Stop reason: SDUPTHER

## 2019-10-16 RX ORDER — LEVOTHYROXINE SODIUM 0.05 MG/1
TABLET ORAL
Qty: 90 TABLET | Refills: 3 | Status: SHIPPED | OUTPATIENT
Start: 2019-10-16 | End: 2020-02-12 | Stop reason: DRUGHIGH

## 2019-10-29 DIAGNOSIS — I10 ESSENTIAL HYPERTENSION: Primary | ICD-10-CM

## 2019-10-29 DIAGNOSIS — E78.5 HYPERLIPIDEMIA LDL GOAL <70: ICD-10-CM

## 2019-10-29 DIAGNOSIS — E03.4 HYPOTHYROIDISM DUE TO ACQUIRED ATROPHY OF THYROID: ICD-10-CM

## 2019-10-29 DIAGNOSIS — E11.9 TYPE 2 DIABETES MELLITUS WITHOUT COMPLICATION, WITHOUT LONG-TERM CURRENT USE OF INSULIN (HCC): ICD-10-CM

## 2019-11-03 ENCOUNTER — RESULTS ENCOUNTER (OUTPATIENT)
Dept: INTERNAL MEDICINE | Facility: CLINIC | Age: 69
End: 2019-11-03

## 2019-11-03 DIAGNOSIS — E78.5 HYPERLIPIDEMIA LDL GOAL <70: ICD-10-CM

## 2019-11-03 DIAGNOSIS — E03.4 HYPOTHYROIDISM DUE TO ACQUIRED ATROPHY OF THYROID: ICD-10-CM

## 2019-11-03 DIAGNOSIS — E11.9 TYPE 2 DIABETES MELLITUS WITHOUT COMPLICATION, WITHOUT LONG-TERM CURRENT USE OF INSULIN (HCC): ICD-10-CM

## 2019-11-03 DIAGNOSIS — I10 ESSENTIAL HYPERTENSION: ICD-10-CM

## 2019-11-05 ENCOUNTER — OFFICE VISIT (OUTPATIENT)
Dept: INTERNAL MEDICINE | Facility: CLINIC | Age: 69
End: 2019-11-05

## 2019-11-05 VITALS
BODY MASS INDEX: 26.97 KG/M2 | SYSTOLIC BLOOD PRESSURE: 143 MMHG | DIASTOLIC BLOOD PRESSURE: 72 MMHG | WEIGHT: 171.8 LBS | HEIGHT: 67 IN | OXYGEN SATURATION: 98 % | HEART RATE: 61 BPM

## 2019-11-05 DIAGNOSIS — Z79.899 ENCOUNTER FOR LONG-TERM (CURRENT) DRUG USE: ICD-10-CM

## 2019-11-05 DIAGNOSIS — R07.1 CHEST PAIN ON BREATHING: ICD-10-CM

## 2019-11-05 DIAGNOSIS — E78.5 HYPERLIPIDEMIA LDL GOAL <70: ICD-10-CM

## 2019-11-05 DIAGNOSIS — E11.9 TYPE 2 DIABETES MELLITUS WITHOUT COMPLICATION, WITHOUT LONG-TERM CURRENT USE OF INSULIN (HCC): ICD-10-CM

## 2019-11-05 DIAGNOSIS — I10 ESSENTIAL HYPERTENSION: Primary | ICD-10-CM

## 2019-11-05 DIAGNOSIS — Z12.5 PROSTATE CANCER SCREENING: ICD-10-CM

## 2019-11-05 DIAGNOSIS — Z00.00 ANNUAL PHYSICAL EXAM: ICD-10-CM

## 2019-11-05 PROBLEM — R07.9 CHEST PAIN AT REST: Status: ACTIVE | Noted: 2019-11-05

## 2019-11-05 PROCEDURE — 99213 OFFICE O/P EST LOW 20 MIN: CPT | Performed by: FAMILY MEDICINE

## 2019-11-05 RX ORDER — METOPROLOL SUCCINATE 25 MG/1
TABLET, EXTENDED RELEASE ORAL
COMMUNITY
Start: 2019-09-16 | End: 2020-03-03 | Stop reason: SDUPTHER

## 2019-11-05 NOTE — PROGRESS NOTES
CC:chest pain,wants vascular screen  Chest pain, lipids,indigestion    Subjective.../HPI  Patient present today with1) chestpain L side with deep breath-several weeks,pain with deep breath  2) Jesus has a history of chronic hyperlipidemia and has been well controlled on current medications.  Patient reports has had hyperlipidemia for 20 years. He is tolerating medications without side effect.  Hyperlipidemia labs will be drawn today.  3) indigestion  4) DM-  I have reviewed the patient's medical history in detail and updated the computerized patient record.    Past Medical History:   Diagnosis Date   • CAD (coronary artery disease)    • Diabetes mellitus (CMS/HCC)    • Dyslipidemia    • Hyperlipidemia    • Hypertension    • Hypothyroidism    • Myocardial infarction (CMS/HCC)        Past Surgical History:   Procedure Laterality Date   • CARDIAC CATHETERIZATION  07/03/2018   • CARDIAC SURGERY     • SKIN BIOPSY      shoulder left       Family History   Problem Relation Age of Onset   • Cancer Mother         skin   • COPD Mother    • Hyperlipidemia Mother    • Hypertension Mother    • Cancer Father         lung   • Diabetes Father    • Heart disease Father    • Hyperlipidemia Father    • Hypertension Father    • Early death Brother         50   • Alcohol abuse Brother    • Colon cancer Neg Hx    • Colon polyps Neg Hx        Social History     Socioeconomic History   • Marital status: Single     Spouse name: Not on file   • Number of children: Not on file   • Years of education: Not on file   • Highest education level: Not on file   Tobacco Use   • Smoking status: Never Smoker   • Smokeless tobacco: Never Used   Substance and Sexual Activity   • Alcohol use: Yes     Alcohol/week: 4.2 oz     Types: 7 Shots of liquor per week   • Sexual activity: Defer       Most Recent Immunizations   Administered Date(s) Administered   • Fluzone High Dose =>65 Years ([employer] ONLY) 01/25/2018       Review of Systems:   Review of  "Systems   Constitutional: Negative.    All other systems reviewed and are negative.        Physical Exam   Constitutional: He is oriented to person, place, and time. He appears well-developed and well-nourished.   Cardiovascular: Normal rate, regular rhythm and normal heart sounds.   Pulmonary/Chest: Effort normal and breath sounds normal.   Neurological: He is alert and oriented to person, place, and time.   Psychiatric: He has a normal mood and affect. His behavior is normal. Judgment and thought content normal.   Vitals reviewed.        Vital Signs     Vitals:    11/05/19 1000   BP: 143/72   Pulse: 61   SpO2: 98%   Weight: 77.9 kg (171 lb 12.8 oz)   Height: 170.2 cm (67\")          Results Review:      REVIEWED AND DISCUSSED CLINICAL RESULTS WITH PATIENT      Requested Prescriptions      No prescriptions requested or ordered in this encounter         Current Outpatient Medications:   •  ALPRAZolam (XANAX) 0.5 MG tablet, TAKE 1 TABLET BY MOUTH TWICE DAILY AS NEEDED FOR ANXIETY, Disp: 60 tablet, Rfl: 3  •  aspirin 81 MG tablet, Take 81 mg by mouth daily., Disp: , Rfl:   •  clopidogrel (PLAVIX) 75 MG tablet, Take 1 tablet by mouth daily., Disp: 90 tablet, Rfl: 1  •  Dapagliflozin Propanediol (FARXIGA) 10 MG tablet, Take 10 mg by mouth Daily., Disp: 30 tablet, Rfl: 6  •  glucose blood (ONE TOUCH ULTRA TEST) test strip, USE 1 STRIP TO CHECK GLUCOSE TWICE DAILY, Disp: 100 each, Rfl: 11  •  irbesartan (AVAPRO) 300 MG tablet, TAKE 1 TABLET BY MOUTH ONCE DAILY, Disp: 90 tablet, Rfl: 3  •  isosorbide mononitrate (IMDUR) 30 MG 24 hr tablet, Take 30 mg by mouth 2 (Two) Times a Day., Disp: , Rfl: 1  •  levothyroxine (SYNTHROID, LEVOTHROID) 50 MCG tablet, TAKE 1 TABLET BY MOUTH ONCE DAILY, Disp: 90 tablet, Rfl: 3  •  metFORMIN (GLUCOPHAGE) 1000 MG tablet, Take 1 tablet by mouth 2 (Two) Times a Day With Meals., Disp: 180 tablet, Rfl: 3  •  metoprolol succinate XL (TOPROL-XL) 25 MG 24 hr tablet, TAKE 1/2 (ONE-HALF) TABLET BY " MOUTH ONCE DAILY, Disp: , Rfl:   •  NITROSTAT 0.4 MG SL tablet, DISSOLVE ONE TABLET UNDER THE TONGUE AS NEEDED, Disp: 25 tablet, Rfl: 0  •  pravastatin (PRAVACHOL) 80 MG tablet, TAKE 1 TABLET BY MOUTH ONCE DAILY, Disp: 90 tablet, Rfl: 1  •  ranolazine (RANEXA) 500 MG 12 hr tablet, Take 1 tablet by mouth 2 (Two) Times a Day., Disp: 180 tablet, Rfl: 1  •  rosuvastatin (CRESTOR) 10 MG tablet, Take 10 mg by mouth Daily., Disp: , Rfl:   •  sitaGLIPtin (JANUVIA) 100 MG tablet, Take 1 tablet by mouth daily., Disp: 60 tablet, Rfl: 0  •  metoprolol tartrate (LOPRESSOR) 25 MG tablet, Take 1 tablet by mouth 2 (Two) Times a Day., Disp: 180 tablet, Rfl: 3    Procedures          Diagnoses and all orders for this visit:    Essential hypertension    Annual physical exam  -     TSH  -     CBC & Differential    Type 2 diabetes mellitus without complication, without long-term current use of insulin (CMS/[facility])  -     Hemoglobin A1c  -     Comprehensive Metabolic Panel    Encounter for long-term (current) drug use  -     CBC & Differential    Prostate cancer screening  -     PSA Screen    Hyperlipidemia LDL goal <70  -     Lipid Panel With LDL / HDL Ratio  -     Comprehensive Metabolic Panel    Chest pain on breathing  -     XR Chest PA & Lateral; Future    Other orders  -     metoprolol succinate XL (TOPROL-XL) 25 MG 24 hr tablet; TAKE 1/2 (ONE-HALF) TABLET BY MOUTH ONCE DAILY        There are no Patient Instructions on file for this visit.     No Follow-up on file.    Shin Melara M.D  11/26/19  8:58 PM

## 2019-11-06 LAB
ALBUMIN SERPL-MCNC: 4.8 G/DL (ref 3.5–5.2)
ALBUMIN/GLOB SERPL: 2 G/DL
ALP SERPL-CCNC: 80 U/L (ref 39–117)
ALT SERPL-CCNC: 19 U/L (ref 1–41)
AST SERPL-CCNC: 11 U/L (ref 1–40)
BASOPHILS # BLD AUTO: 0.09 10*3/MM3 (ref 0–0.2)
BASOPHILS NFR BLD AUTO: 1.3 % (ref 0–1.5)
BILIRUB SERPL-MCNC: 0.5 MG/DL (ref 0.2–1.2)
BUN SERPL-MCNC: 16 MG/DL (ref 8–23)
BUN/CREAT SERPL: 19.5 (ref 7–25)
CALCIUM SERPL-MCNC: 9.6 MG/DL (ref 8.6–10.5)
CHLORIDE SERPL-SCNC: 100 MMOL/L (ref 98–107)
CHOLEST SERPL-MCNC: 183 MG/DL (ref 0–200)
CO2 SERPL-SCNC: 25.6 MMOL/L (ref 22–29)
CREAT SERPL-MCNC: 0.82 MG/DL (ref 0.76–1.27)
EOSINOPHIL # BLD AUTO: 0.28 10*3/MM3 (ref 0–0.4)
EOSINOPHIL NFR BLD AUTO: 4.2 % (ref 0.3–6.2)
ERYTHROCYTE [DISTWIDTH] IN BLOOD BY AUTOMATED COUNT: 13.4 % (ref 12.3–15.4)
GLOBULIN SER CALC-MCNC: 2.4 GM/DL
GLUCOSE SERPL-MCNC: 207 MG/DL (ref 65–99)
HBA1C MFR BLD: 7.7 % (ref 4.8–5.6)
HCT VFR BLD AUTO: 44.2 % (ref 37.5–51)
HDLC SERPL-MCNC: 43 MG/DL (ref 40–60)
HGB BLD-MCNC: 14.8 G/DL (ref 13–17.7)
IMM GRANULOCYTES # BLD AUTO: 0.03 10*3/MM3 (ref 0–0.05)
IMM GRANULOCYTES NFR BLD AUTO: 0.4 % (ref 0–0.5)
LDLC SERPL CALC-MCNC: ABNORMAL MG/DL
LDLC/HDLC SERPL: ABNORMAL {RATIO}
LYMPHOCYTES # BLD AUTO: 2.06 10*3/MM3 (ref 0.7–3.1)
LYMPHOCYTES NFR BLD AUTO: 30.7 % (ref 19.6–45.3)
MCH RBC QN AUTO: 29.4 PG (ref 26.6–33)
MCHC RBC AUTO-ENTMCNC: 33.5 G/DL (ref 31.5–35.7)
MCV RBC AUTO: 87.9 FL (ref 79–97)
MONOCYTES # BLD AUTO: 0.69 10*3/MM3 (ref 0.1–0.9)
MONOCYTES NFR BLD AUTO: 10.3 % (ref 5–12)
NEUTROPHILS # BLD AUTO: 3.55 10*3/MM3 (ref 1.7–7)
NEUTROPHILS NFR BLD AUTO: 53.1 % (ref 42.7–76)
NRBC BLD AUTO-RTO: 0 /100 WBC (ref 0–0.2)
PLATELET # BLD AUTO: 228 10*3/MM3 (ref 140–450)
POTASSIUM SERPL-SCNC: 4.6 MMOL/L (ref 3.5–5.2)
PROT SERPL-MCNC: 7.2 G/DL (ref 6–8.5)
PSA SERPL-MCNC: 1.02 NG/ML (ref 0–4)
RBC # BLD AUTO: 5.03 10*6/MM3 (ref 4.14–5.8)
SODIUM SERPL-SCNC: 139 MMOL/L (ref 136–145)
TRIGL SERPL-MCNC: 462 MG/DL (ref 0–150)
TSH SERPL DL<=0.005 MIU/L-ACNC: 4.69 UIU/ML (ref 0.27–4.2)
VLDLC SERPL CALC-MCNC: ABNORMAL MG/DL
WBC # BLD AUTO: 6.7 10*3/MM3 (ref 3.4–10.8)

## 2019-11-13 ENCOUNTER — TRANSCRIBE ORDERS (OUTPATIENT)
Dept: ADMINISTRATIVE | Facility: HOSPITAL | Age: 69
End: 2019-11-13

## 2019-11-13 DIAGNOSIS — Z13.9 VISIT FOR SCREENING: Primary | ICD-10-CM

## 2019-11-19 ENCOUNTER — HOSPITAL ENCOUNTER (OUTPATIENT)
Dept: GENERAL RADIOLOGY | Facility: HOSPITAL | Age: 69
Discharge: HOME | End: 2019-11-19
Admitting: FAMILY MEDICINE

## 2019-11-19 ENCOUNTER — HOSPITAL ENCOUNTER (OUTPATIENT)
Dept: CARDIOLOGY | Facility: HOSPITAL | Age: 69
Discharge: HOME | End: 2019-11-19
Admitting: FAMILY MEDICINE

## 2019-11-19 VITALS
SYSTOLIC BLOOD PRESSURE: 136 MMHG | BODY MASS INDEX: 27 KG/M2 | HEART RATE: 60 BPM | DIASTOLIC BLOOD PRESSURE: 71 MMHG | WEIGHT: 168 LBS | HEIGHT: 66 IN

## 2019-11-19 DIAGNOSIS — Z13.9 VISIT FOR SCREENING: ICD-10-CM

## 2019-11-19 DIAGNOSIS — R07.1 CHEST PAIN ON BREATHING: ICD-10-CM

## 2019-11-19 LAB
BH CV ECHO MEAS - DIST AO DIAM: 1.43 CM
BH CV VAS BP LEFT ARM: NORMAL MMHG
BH CV VAS BP RIGHT ARM: NORMAL MMHG
BH CV XLRA MEAS - MID AO DIAM: 1.62 CM
BH CV XLRA MEAS - PAD LEFT ABI DP: NORMAL
BH CV XLRA MEAS - PAD LEFT ABI PT: NORMAL
BH CV XLRA MEAS - PAD LEFT ARM: 136 MMHG
BH CV XLRA MEAS - PAD LEFT LEG DP: NORMAL MMHG
BH CV XLRA MEAS - PAD LEFT LEG PT: NORMAL MMHG
BH CV XLRA MEAS - PAD RIGHT ABI DP: NORMAL
BH CV XLRA MEAS - PAD RIGHT ABI PT: NORMAL
BH CV XLRA MEAS - PAD RIGHT ARM: 133 MMHG
BH CV XLRA MEAS - PAD RIGHT LEG DP: NORMAL MMHG
BH CV XLRA MEAS - PAD RIGHT LEG PT: NORMAL MMHG
BH CV XLRA MEAS - PROX AO DIAM: 1.7 CM
BH CV XLRA MEAS LEFT ICA/CCA RATIO: 1.82
BH CV XLRA MEAS LEFT MID CCA PSV: NORMAL CM/SEC
BH CV XLRA MEAS LEFT MID ICA PSV: NORMAL CM/SEC
BH CV XLRA MEAS LEFT PROX ECA PSV: NORMAL CM/SEC
BH CV XLRA MEAS RIGHT ICA/CCA RATIO: 1.28
BH CV XLRA MEAS RIGHT MID CCA PSV: NORMAL CM/SEC
BH CV XLRA MEAS RIGHT MID ICA PSV: NORMAL CM/SEC
BH CV XLRA MEAS RIGHT PROX ECA PSV: NORMAL CM/SEC

## 2019-11-19 PROCEDURE — 71046 X-RAY EXAM CHEST 2 VIEWS: CPT

## 2019-11-19 PROCEDURE — 93799 UNLISTED CV SVC/PROCEDURE: CPT

## 2020-01-08 RX ORDER — GEMFIBROZIL 600 MG/1
600 TABLET, FILM COATED ORAL DAILY
Qty: 30 TABLET | Refills: 11 | Status: SHIPPED | OUTPATIENT
Start: 2020-01-08 | End: 2021-02-15

## 2020-01-08 RX ORDER — RANOLAZINE 500 MG/1
500 TABLET, EXTENDED RELEASE ORAL 2 TIMES DAILY
Qty: 180 TABLET | Refills: 3 | Status: SHIPPED | OUTPATIENT
Start: 2020-01-08 | End: 2020-01-13 | Stop reason: SDUPTHER

## 2020-01-13 RX ORDER — RANOLAZINE 500 MG/1
500 TABLET, EXTENDED RELEASE ORAL 2 TIMES DAILY
Qty: 180 TABLET | Refills: 3 | Status: SHIPPED | OUTPATIENT
Start: 2020-01-13 | End: 2021-01-19

## 2020-02-06 ENCOUNTER — OFFICE VISIT (OUTPATIENT)
Dept: INTERNAL MEDICINE | Facility: CLINIC | Age: 70
End: 2020-02-06

## 2020-02-06 VITALS
WEIGHT: 173.2 LBS | HEIGHT: 66 IN | OXYGEN SATURATION: 97 % | RESPIRATION RATE: 16 BRPM | DIASTOLIC BLOOD PRESSURE: 72 MMHG | SYSTOLIC BLOOD PRESSURE: 140 MMHG | TEMPERATURE: 97.9 F | HEART RATE: 62 BPM | BODY MASS INDEX: 27.83 KG/M2

## 2020-02-06 DIAGNOSIS — E03.4 HYPOTHYROIDISM DUE TO ACQUIRED ATROPHY OF THYROID: ICD-10-CM

## 2020-02-06 DIAGNOSIS — I10 ESSENTIAL HYPERTENSION: ICD-10-CM

## 2020-02-06 DIAGNOSIS — J30.2 SEASONAL ALLERGIC RHINITIS, UNSPECIFIED TRIGGER: ICD-10-CM

## 2020-02-06 DIAGNOSIS — F41.9 ANXIETY: ICD-10-CM

## 2020-02-06 DIAGNOSIS — N40.1 BPH ASSOCIATED WITH NOCTURIA: ICD-10-CM

## 2020-02-06 DIAGNOSIS — R35.1 BPH ASSOCIATED WITH NOCTURIA: ICD-10-CM

## 2020-02-06 DIAGNOSIS — I25.10 CORONARY ARTERY DISEASE INVOLVING NATIVE CORONARY ARTERY OF NATIVE HEART WITHOUT ANGINA PECTORIS: Primary | ICD-10-CM

## 2020-02-06 DIAGNOSIS — E11.65 TYPE 2 DIABETES MELLITUS WITH HYPERGLYCEMIA, WITHOUT LONG-TERM CURRENT USE OF INSULIN (HCC): ICD-10-CM

## 2020-02-06 DIAGNOSIS — E78.5 HYPERLIPIDEMIA LDL GOAL <70: ICD-10-CM

## 2020-02-06 PROBLEM — E11.9 TYPE 2 DIABETES MELLITUS WITHOUT COMPLICATION: Status: RESOLVED | Noted: 2018-12-18 | Resolved: 2020-02-06

## 2020-02-06 PROCEDURE — 99214 OFFICE O/P EST MOD 30 MIN: CPT | Performed by: NURSE PRACTITIONER

## 2020-02-06 RX ORDER — FLUTICASONE PROPIONATE 50 MCG
2 SPRAY, SUSPENSION (ML) NASAL DAILY
Qty: 1 BOTTLE | Refills: 2 | Status: SHIPPED | OUTPATIENT
Start: 2020-02-06 | End: 2021-06-10

## 2020-02-06 NOTE — ASSESSMENT & PLAN NOTE
Diabetes is worsening.   NON compliant with diet.   States he will improve.  May need to change treatment.   Medication costs is an issue for him. Gets some in [place].  Diabetes will be reassessed in 6 months.    Check urine micro today

## 2020-02-06 NOTE — ASSESSMENT & PLAN NOTE
Lipid abnormalities are worsening.  Pharmacotherapy as ordered.  Lipids will be reassessed in 6 months.    Check labs today - may need to adjust rx   Needs to work on diet - still eating a lot of carbs

## 2020-02-06 NOTE — PROGRESS NOTES
Name: Jesus Cuba  :  1950    Subjective:      Chief Complaint   Patient presents with   • Hypertension     3 Month F/U        Jesus Cuba is a 69 y.o. male prior patient of Shin Melara MD. Dr Melara has now retired and he is here to establish care with me.  He has multiple chronic medical conditions including: hypertension, HLD, DMII, CAD, GERD, Anxiety     He is new to me.  Last seen by Dr Melara 19.     Hypertension   This is a chronic problem. The current episode started more than 1 year ago. The problem has been gradually improving since onset. The problem is controlled. Associated symptoms include anxiety. Pertinent negatives include no chest pain, headaches, palpitations, peripheral edema or shortness of breath. Agents associated with hypertension include thyroid hormones. Risk factors for coronary artery disease include diabetes mellitus, dyslipidemia, family history, obesity and male gender. Past treatments include angiotensin blockers and beta blockers. Current antihypertension treatment includes beta blockers and angiotensin blockers. The current treatment provides mild improvement. Compliance problems include diet and exercise.  Hypertensive end-organ damage includes CAD/MI. Identifiable causes of hypertension include a thyroid problem.   Hyperlipidemia   This is a chronic problem. The current episode started more than 1 year ago. The problem is uncontrolled. Exacerbating diseases include diabetes, hypothyroidism and obesity. Factors aggravating his hyperlipidemia include beta blockers. Pertinent negatives include no chest pain, myalgias or shortness of breath. Current antihyperlipidemic treatment includes statins and fibric acid derivatives. The current treatment provides mild improvement of lipids. Compliance problems include adherence to exercise and adherence to diet.  Risk factors for coronary artery disease include diabetes mellitus, dyslipidemia, family  history, male sex, obesity and hypertension.   Hypothyroidism   This is a chronic problem. The current episode started more than 1 year ago. The problem occurs constantly. The problem has been gradually improving. Associated symptoms include congestion. Pertinent negatives include no chest pain, coughing, fatigue, headaches, myalgias or visual change. Nothing aggravates the symptoms. He has tried nothing for the symptoms. The treatment provided moderate relief.   Diabetes   He presents for his follow-up diabetic visit. He has type 2 diabetes mellitus. No MedicAlert identification noted. His disease course has been worsening. Hypoglycemia symptoms include nervousness/anxiousness. Pertinent negatives for hypoglycemia include no headaches. Pertinent negatives for diabetes include no chest pain, no fatigue, no foot ulcerations, no polydipsia, no polyphagia, no polyuria and no visual change. There are no hypoglycemic complications. Symptoms are worsening. Diabetic complications include heart disease and impotence. Risk factors for coronary artery disease include obesity, male sex, hypertension, family history, dyslipidemia and diabetes mellitus. He is following a generally unhealthy diet. He has not had a previous visit with a dietitian. He participates in exercise intermittently. His home blood glucose trend is fluctuating dramatically. An ACE inhibitor/angiotensin II receptor blocker is being taken. He does not see a podiatrist.Eye exam is current (vision works last summer (yearly)).   Anxiety   Presents for follow-up visit. Symptoms include impotence and nervous/anxious behavior. Patient reports no chest pain, palpitations or shortness of breath. Symptoms occur rarely. The severity of symptoms is mild. The quality of sleep is fair. Nighttime awakenings: occasional.       Takes xanax 2-5 times a month.  States will get panic at times due to his history of heart disease and this will calm his stress.     CAD  Chronic.   Was being seen by Dr Curiel but switched to Dr Tobar in 2019. He has intermittent chest discomfort. Dr Tobar started him on ranexa and metoprolol for small vessel disease . His angina has improved. Dr Tobar recently left and now he will be seeing Dr Sprague.     ED  Seen by urology on 10/8/19.  States takes injections and dose was just increased and is working well.     BPH  He does complain of some sx of starting stream but states does not want to try another treatment now.     He also states for the last few days he has had more postnasal drip.  Going down back of throat and making him hoarse.  No ST, F/C   He uses saline spray routinely.       Declines flu vaccine    Works in hanging curtains and blinds    I have reviewed the patient's medical history in detail and updated the computerized patient record.    Past Medical History:   Diagnosis Date   • CAD (coronary artery disease)    • Diabetes mellitus (CMS/HCC)    • Dyslipidemia    • Hyperlipidemia    • Hypertension    • Hypothyroidism    • Myocardial infarction (CMS/HCC)        Past Surgical History:   Procedure Laterality Date   • CARDIAC CATHETERIZATION  07/03/2018   • CARDIAC SURGERY     • CORONARY ANGIOPLASTY WITH STENT PLACEMENT      4 stents  over several years - first in 2002   • SKIN BIOPSY      shoulder left       Family History   Problem Relation Age of Onset   • Cancer Mother         skin   • COPD Mother    • Hyperlipidemia Mother    • Hypertension Mother    • Cancer Father         lung   • Diabetes Father    • Heart disease Father    • Hyperlipidemia Father    • Hypertension Father    • Early death Brother         50   • Alcohol abuse Brother    • Colon cancer Neg Hx    • Colon polyps Neg Hx        Social History     Socioeconomic History   • Marital status: Single     Spouse name: Not on file   • Number of children: Not on file   • Years of education: Not on file   • Highest education level: Not on file   Tobacco Use   • Smoking status: Never Smoker   •  Smokeless tobacco: Never Used   Substance and Sexual Activity   • Alcohol use: Yes     Alcohol/week: 7.0 standard drinks     Types: 7 Shots of liquor per week   • Sexual activity: Defer       Most Recent Immunizations   Administered Date(s) Administered   • Fluzone High Dose =>65 Years ([employer] ONLY) 01/25/2018         Review of Systems:   Review of Systems   Constitutional: Negative for fatigue and unexpected weight change.   HENT: Positive for congestion, postnasal drip and voice change.    Respiratory: Negative for cough and shortness of breath.    Cardiovascular: Negative for chest pain, palpitations and leg swelling.   Gastrointestinal: Negative.         BM q 2 days is normal for him    Endocrine: Negative for polydipsia, polyphagia and polyuria.   Genitourinary: Positive for difficulty urinating and impotence.   Musculoskeletal: Negative for myalgias.   Neurological: Negative for headaches.   Hematological: Bruises/bleeds easily.   Psychiatric/Behavioral: The patient is nervous/anxious.          Objective:      Physical Exam:   Physical Exam   Constitutional: He is oriented to person, place, and time. He appears well-developed. He is cooperative.   HENT:   Mouth/Throat: No oropharyngeal exudate.   Turbinates pale and boggy    Eyes: Conjunctivae are normal.   Neck: Normal range of motion. No thyromegaly present.   Cardiovascular: Normal rate, regular rhythm, normal heart sounds, intact distal pulses and normal pulses.   Pulmonary/Chest: Effort normal and breath sounds normal. He exhibits no deformity.   Equal, Unlabored   Abdominal: Soft. Bowel sounds are normal.   Obese    Musculoskeletal: Normal range of motion. He exhibits no edema.    Jesus had a diabetic foot exam performed today.   During the foot exam he had a monofilament test performed.    Neurological Sensory Findings -  No right ankle/foot altered proprioception and no left ankle/foot altered proprioception  Vascular Status -  His right foot  "exhibits normal foot vasculature  and no edema. His left foot exhibits normal foot vasculature  and no edema.  Skin Integrity  -  His right foot skin is intact.His left foot skin is intact..  Neurological: He is alert and oriented to person, place, and time.   Skin: Skin is warm and dry. Capillary refill takes 2 to 3 seconds.   Psychiatric: He has a normal mood and affect. His behavior is normal. Judgment and thought content normal.   Vitals reviewed.        Vital Signs:  Vitals:    20 0820 20 0924   BP: 160/87 140/72   BP Location: Left arm Left arm   Patient Position: Sitting    Cuff Size: Small Adult    Pulse: 62    Resp: 16    Temp: 97.9 °F (36.6 °C)    TempSrc: Oral    SpO2: 97%    Weight: 78.6 kg (173 lb 3.2 oz)    Height: 166.4 cm (65.5\")      Body mass index is 28.38 kg/m².      Results Review:      REVIEWED AND DISCUSSED LAB RESULTS WITH PATIENT  Lab Results   Component Value Date    CHLPL 183 2019    TRIG 462 (H) 2019    HDL 43 2019    LDL CANCELED 2019     Lab Results   Component Value Date    HGBA1C 7.70 (H) 2019     Lab Results   Component Value Date    CALCIUM 9.6 2019     2019    K 4.6 2019    CO2 25.6 2019     2019    BUN 16 2019    CREATININE 0.82 2019    EGFRIFAFRI 113 2019    EGFRIFNONA 93 2019    BCR 19.5 2019       Requested Prescriptions     Signed Prescriptions Disp Refills   • fluticasone (FLONASE) 50 MCG/ACT nasal spray 1 bottle 2     Si sprays into the nostril(s) as directed by provider Daily.     Routine medications provided by this office will also be refilled via pharmacy request.       Current Outpatient Medications:   •  ALPRAZolam (XANAX) 0.5 MG tablet, TAKE 1 TABLET BY MOUTH TWICE DAILY AS NEEDED FOR ANXIETY, Disp: 60 tablet, Rfl: 3  •  aspirin 81 MG tablet, Take 81 mg by mouth daily., Disp: , Rfl:   •  clopidogrel (PLAVIX) 75 MG tablet, Take 1 tablet by mouth daily., " Disp: 90 tablet, Rfl: 1  •  Dapagliflozin Propanediol (FARXIGA) 10 MG tablet, Take 10 mg by mouth Daily., Disp: 30 tablet, Rfl: 6  •  gemfibrozil (LOPID) 600 MG tablet, Take 1 tablet by mouth Daily., Disp: 30 tablet, Rfl: 11  •  glucose blood (ONE TOUCH ULTRA TEST) test strip, USE 1 STRIP TO CHECK GLUCOSE TWICE DAILY, Disp: 100 each, Rfl: 11  •  irbesartan (AVAPRO) 300 MG tablet, TAKE 1 TABLET BY MOUTH ONCE DAILY, Disp: 90 tablet, Rfl: 3  •  isosorbide mononitrate (IMDUR) 30 MG 24 hr tablet, Take 30 mg by mouth 2 (Two) Times a Day., Disp: , Rfl: 1  •  levothyroxine (SYNTHROID, LEVOTHROID) 50 MCG tablet, TAKE 1 TABLET BY MOUTH ONCE DAILY, Disp: 90 tablet, Rfl: 3  •  metFORMIN (GLUCOPHAGE) 1000 MG tablet, TAKE 1 TABLET BY MOUTH TWICE DAILY WITH MEALS, Disp: 180 tablet, Rfl: 1  •  metoprolol tartrate (LOPRESSOR) 25 MG tablet, Take 1 tablet by mouth 2 (Two) Times a Day., Disp: 180 tablet, Rfl: 3  •  NITROSTAT 0.4 MG SL tablet, DISSOLVE ONE TABLET UNDER THE TONGUE AS NEEDED, Disp: 25 tablet, Rfl: 0  •  pravastatin (PRAVACHOL) 80 MG tablet, TAKE 1 TABLET BY MOUTH ONCE DAILY, Disp: 90 tablet, Rfl: 1  •  ranolazine (RANEXA) 500 MG 12 hr tablet, Take 1 tablet by mouth 2 (Two) Times a Day., Disp: 180 tablet, Rfl: 3  •  sitaGLIPtin (JANUVIA) 100 MG tablet, Take 1 tablet by mouth daily., Disp: 60 tablet, Rfl: 0  •  fluticasone (FLONASE) 50 MCG/ACT nasal spray, 2 sprays into the nostril(s) as directed by provider Daily., Disp: 1 bottle, Rfl: 2  •  metoprolol succinate XL (TOPROL-XL) 25 MG 24 hr tablet, TAKE 1/2 (ONE-HALF) TABLET BY MOUTH ONCE DAILY, Disp: , Rfl:   •  rosuvastatin (CRESTOR) 10 MG tablet, Take 10 mg by mouth Daily., Disp: , Rfl:        Assessment and Plan:        Problem List Items Addressed This Visit        Cardiovascular and Mediastinum    CAD (coronary artery disease) - Primary     Coronary artery disease is improving with treatment.  Continue current treatment regimen.  Dietary sodium restriction.  Weight  loss.  Regular aerobic exercise.  Continue current medications.  Cardiac status will be reassessed in 6 months.         Relevant Orders    Lipid Panel With LDL / HDL Ratio    Hyperlipidemia LDL goal <70     Lipid abnormalities are worsening.  Pharmacotherapy as ordered.  Lipids will be reassessed in 6 months.    Check labs today - may need to adjust rx   Needs to work on diet - still eating a lot of carbs          Relevant Orders    Lipid Panel With LDL / HDL Ratio    Hypertension     Hypertension is improving with treatment.  Continue current treatment regimen.  Dietary sodium restriction.  Weight loss.  Regular aerobic exercise.  Blood pressure will be reassessed at the next regular appointment.            Endocrine    Hypothyroidism     Stable on current dose          Relevant Orders    TSH    T4, free    Type 2 diabetes mellitus with hyperglycemia, without long-term current use of insulin (CMS/[facility])     Diabetes is worsening.   NON compliant with diet.   States he will improve.  May need to change treatment.   Medication costs is an issue for him. Gets some in [place].  Diabetes will be reassessed in 6 months.    Check urine micro today          Relevant Orders    Basic Metabolic Panel    Microalbumin / Creatinine Urine Ratio - Urine, Clean Catch       Genitourinary    BPH associated with nocturia     Declines flomax  States will f/u with urology             Other    Anxiety     Stable on current dose           Other Visit Diagnoses     Seasonal allergic rhinitis, unspecified trigger        Instructed on how to use nasal spray     Relevant Medications    fluticasone (FLONASE) 50 MCG/ACT nasal spray             The patient has read and signed the [facility] Controlled Substance Contract.  I will continue to see patient for regular follow up appointments.  They are well controlled on their medication.  SUHAS is updated every 3 months. The patient is aware of the potential for addiction and  "dependence.    Discussed any change in Rx and discussed visit with patient.  All questions answered.      Return in about 6 months (around 8/6/2020).    Branden \"Darien\" CHANDU Lowe   02/06/20    Dragon disclaimer:   Much of this encounter note is an electronic transcription/translation of spoken language to printed text. The electronic translation of spoken language may permit erroneous, or at times, nonsensical words or phrases to be inadvertently transcribed; Although I have reviewed the note for such errors, some may still exist.     Additional Patient Counseling:       Patient Instructions   Diabetic eye exam yearly     Diet:    • Eat vegetables, fruits, whole grain, low-fat dairy, poultry, fish, beans, nontropical vegetable oils, and nuts, but avoid red meat (i.e., Mediterranean-style diet, DASH [Dietary Approaches to Stop Hypertension] diet).  • Limit sugary drinks and sweets.  • Limit saturated and trans fat to 5% to 6% of calories.  • Limit sodium intake to 2,400 mg daily (about one teaspoon table salt [kosher/sea salt have less sodium per teaspoon]).  Weight loss / Calorie Counting Apps:    • Lose It!   • My[employer]Webs[employer]ort Pal   • Works great when you try it with a partner/ friend  Exercise:   • Engage in moderate-to-vigorous aerobic activity for at least 40 minutes (on average) three to four times each week.  Wearables:   • Activity tracker   • Step tracker   Skin Care:   • Use sun screen SPF >30 daily  • Dermatologist for skin check regularly  Bone Health:   • [email or web address]      "

## 2020-02-06 NOTE — PATIENT INSTRUCTIONS
Diabetic eye exam yearly     Diet:    • Eat vegetables, fruits, whole grain, low-fat dairy, poultry, fish, beans, nontropical vegetable oils, and nuts, but avoid red meat (i.e., Mediterranean-style diet, DASH [Dietary Approaches to Stop Hypertension] diet).  • Limit sugary drinks and sweets.  • Limit saturated and trans fat to 5% to 6% of calories.  • Limit sodium intake to 2,400 mg daily (about one teaspoon table salt [kosher/sea salt have less sodium per teaspoon]).  Weight loss / Calorie Counting Apps:    • Lose It!   • [employer] RFID Pal   • Works great when you try it with a partner/ friend  Exercise:   • Engage in moderate-to-vigorous aerobic activity for at least 40 minutes (on average) three to four times each week.  Wearables:   • Activity tracker   • Step tracker   Skin Care:   • Use sun screen SPF >30 daily  • Dermatologist for skin check regularly  Bone Health:   • [email or web address]

## 2020-02-07 LAB
ALBUMIN/CREAT UR: 10 MG/G CREAT (ref 0–29)
BUN SERPL-MCNC: 24 MG/DL (ref 8–23)
BUN/CREAT SERPL: 26.7 (ref 7–25)
CALCIUM SERPL-MCNC: 9.3 MG/DL (ref 8.6–10.5)
CHLORIDE SERPL-SCNC: 102 MMOL/L (ref 98–107)
CHOLEST SERPL-MCNC: 166 MG/DL (ref 0–200)
CO2 SERPL-SCNC: 23.8 MMOL/L (ref 22–29)
CREAT SERPL-MCNC: 0.9 MG/DL (ref 0.76–1.27)
CREAT UR-MCNC: 62.7 MG/DL
GLUCOSE SERPL-MCNC: 190 MG/DL (ref 65–99)
HDLC SERPL-MCNC: 38 MG/DL (ref 40–60)
LDLC SERPL CALC-MCNC: 64 MG/DL (ref 0–100)
LDLC/HDLC SERPL: 1.68 {RATIO}
MICROALBUMIN UR-MCNC: 6.2 UG/ML
POTASSIUM SERPL-SCNC: 4.2 MMOL/L (ref 3.5–5.2)
SODIUM SERPL-SCNC: 138 MMOL/L (ref 136–145)
T4 FREE SERPL-MCNC: 1.49 NG/DL (ref 0.93–1.7)
TRIGL SERPL-MCNC: 320 MG/DL (ref 0–150)
TSH SERPL DL<=0.005 MIU/L-ACNC: 6.07 UIU/ML (ref 0.27–4.2)
VLDLC SERPL CALC-MCNC: 64 MG/DL

## 2020-02-11 ENCOUNTER — OFFICE VISIT (OUTPATIENT)
Dept: CARDIOLOGY | Facility: CLINIC | Age: 70
End: 2020-02-11

## 2020-02-11 VITALS
DIASTOLIC BLOOD PRESSURE: 88 MMHG | WEIGHT: 171.4 LBS | HEART RATE: 64 BPM | SYSTOLIC BLOOD PRESSURE: 148 MMHG | BODY MASS INDEX: 26.9 KG/M2 | HEIGHT: 67 IN

## 2020-02-11 DIAGNOSIS — E11.65 TYPE 2 DIABETES MELLITUS WITH HYPERGLYCEMIA, WITHOUT LONG-TERM CURRENT USE OF INSULIN (HCC): ICD-10-CM

## 2020-02-11 DIAGNOSIS — E03.4 HYPOTHYROIDISM DUE TO ACQUIRED ATROPHY OF THYROID: ICD-10-CM

## 2020-02-11 DIAGNOSIS — I10 ESSENTIAL HYPERTENSION: ICD-10-CM

## 2020-02-11 DIAGNOSIS — I25.10 CORONARY ARTERY DISEASE INVOLVING NATIVE CORONARY ARTERY OF NATIVE HEART WITHOUT ANGINA PECTORIS: Primary | ICD-10-CM

## 2020-02-11 DIAGNOSIS — E11.65 TYPE 2 DIABETES MELLITUS WITH HYPERGLYCEMIA, WITHOUT LONG-TERM CURRENT USE OF INSULIN (HCC): Primary | ICD-10-CM

## 2020-02-11 DIAGNOSIS — Z95.5 HISTORY OF CORONARY ARTERY STENT PLACEMENT: ICD-10-CM

## 2020-02-11 DIAGNOSIS — E78.5 HYPERLIPIDEMIA LDL GOAL <70: ICD-10-CM

## 2020-02-11 PROCEDURE — 99214 OFFICE O/P EST MOD 30 MIN: CPT | Performed by: INTERNAL MEDICINE

## 2020-02-11 PROCEDURE — 93000 ELECTROCARDIOGRAM COMPLETE: CPT | Performed by: INTERNAL MEDICINE

## 2020-02-11 NOTE — PROGRESS NOTES
Subjective:     Encounter Date:02/11/2020      Patient ID: Jesus Cuba is a 69 y.o. male.    Chief Complaint:  History of Present Illness    This is a 69-year-old with a history of coronary artery disease status post prior stents to the mid LAD, proximal posterior descending artery, and first diagonal branch, hypertension, hyperlipidemia, diabetes mellitus type 2, hypothyroidism, who presents for follow-up.    The patient was previously followed by Dr. Curiel and then most recently Dr. Tobar.  His prior cardiac history includes that of stents to his mid LAD, proximal posterior descending branch, and most recently stenting of his first diagonal branch in 7/2018 with a resolute 2.0 x 30 mm stent.  At the time of that cardiac catheterization he was noted to have 30 to 40% stenosis at the proximal edge of his widely patent mid LAD stent, severe diffuse disease of a small left circumflex artery, mild nonobstructive disease of a moderate caliber ramus branch, mild nonobstructive disease of his right coronary artery, patent stent of the proximal posterior descending branch with 50 to 60% stenosis at the proximal edge of the stent and 40 to 50% stenosis of the distal edge of the stent.  At the time of the cardiac catheterization Dr. Curiel recommended further evaluation of the posterior descending artery disease if he continued to have symptoms.      He was then seen by Dr. Tobar initially in 1/2019 when he presented for a second opinion.  At that time he reported pinpoint chest discomfort in his left chest that got better in the past after prior coronary interventions but at the time of his office visit was still occurring despite his recent stent placement.  He also reported dyspnea on exertion and chest tightness which had improved since his stent placement.  At the time of that office visit Dr. Tobar felt that he had class I angina that should be treated medically.  He was already on isosorbide mononitrate 30 mg a day  and additionally he was started on a low-dose of metoprolol tartrate and ranolazine 500 mg twice a day.  He then saw YAMILA Fregoso in follow-up in 8/2019 at which time he was feeling really well.  He denies any further anginal symptoms since the initiation of medical therapy and was able to walk 1-1/2 to 2 miles 3 times a week without any significant issues.    He presents today for routine 6-month follow-up.  He continues to remain asymptomatic.  He does admit that he has not been walking over the last month since the temperatures became colder and the weather worsened.  He denies any chest pain, dyspnea, palpitations, orthopnea, near-syncope or syncope, or lower extremity edema.  He remains compliant with all his medications.  He is surprised that his blood pressures are elevated in the office.  He reports that the highest his systolics will normally run are in the 130s.    Review of Systems   Constitution: Negative for malaise/fatigue.   HENT: Negative for hearing loss, hoarse voice, nosebleeds and sore throat.    Eyes: Negative for pain.   Cardiovascular: Negative for chest pain, claudication, cyanosis, dyspnea on exertion, irregular heartbeat, leg swelling, near-syncope, orthopnea, palpitations, paroxysmal nocturnal dyspnea and syncope.   Respiratory: Negative for shortness of breath and snoring.    Endocrine: Negative for cold intolerance, heat intolerance, polydipsia, polyphagia and polyuria.   Skin: Negative for itching and rash.   Musculoskeletal: Negative for arthritis, falls, joint pain, joint swelling, muscle cramps, muscle weakness and myalgias.   Gastrointestinal: Negative for constipation, diarrhea, dysphagia, heartburn, hematemesis, hematochezia, melena, nausea and vomiting.   Genitourinary: Negative for frequency, hematuria and hesitancy.   Neurological: Negative for excessive daytime sleepiness, dizziness, headaches, light-headedness, numbness and weakness.   Psychiatric/Behavioral: Negative  for depression. The patient is not nervous/anxious.          Current Outpatient Medications:   •  ALPRAZolam (XANAX) 0.5 MG tablet, TAKE 1 TABLET BY MOUTH TWICE DAILY AS NEEDED FOR ANXIETY, Disp: 60 tablet, Rfl: 3  •  aspirin 81 MG tablet, Take 81 mg by mouth daily., Disp: , Rfl:   •  clopidogrel (PLAVIX) 75 MG tablet, Take 1 tablet by mouth daily., Disp: 90 tablet, Rfl: 1  •  Dapagliflozin Propanediol (FARXIGA) 10 MG tablet, Take 10 mg by mouth Daily., Disp: 30 tablet, Rfl: 6  •  fluticasone (FLONASE) 50 MCG/ACT nasal spray, 2 sprays into the nostril(s) as directed by provider Daily., Disp: 1 bottle, Rfl: 2  •  glucose blood (ONE TOUCH ULTRA TEST) test strip, USE 1 STRIP TO CHECK GLUCOSE TWICE DAILY, Disp: 100 each, Rfl: 11  •  irbesartan (AVAPRO) 300 MG tablet, TAKE 1 TABLET BY MOUTH ONCE DAILY, Disp: 90 tablet, Rfl: 3  •  isosorbide mononitrate (IMDUR) 30 MG 24 hr tablet, Take 30 mg by mouth 2 (Two) Times a Day., Disp: , Rfl: 1  •  levothyroxine (SYNTHROID, LEVOTHROID) 50 MCG tablet, TAKE 1 TABLET BY MOUTH ONCE DAILY, Disp: 90 tablet, Rfl: 3  •  metFORMIN (GLUCOPHAGE) 1000 MG tablet, TAKE 1 TABLET BY MOUTH TWICE DAILY WITH MEALS, Disp: 180 tablet, Rfl: 1  •  metoprolol succinate XL (TOPROL-XL) 25 MG 24 hr tablet, TAKE 1/2 (ONE-HALF) TABLET BY MOUTH ONCE DAILY, Disp: , Rfl:   •  NITROSTAT 0.4 MG SL tablet, DISSOLVE ONE TABLET UNDER THE TONGUE AS NEEDED, Disp: 25 tablet, Rfl: 0  •  pravastatin (PRAVACHOL) 80 MG tablet, TAKE 1 TABLET BY MOUTH ONCE DAILY, Disp: 90 tablet, Rfl: 1  •  ranolazine (RANEXA) 500 MG 12 hr tablet, Take 1 tablet by mouth 2 (Two) Times a Day., Disp: 180 tablet, Rfl: 3  •  sitaGLIPtin (JANUVIA) 100 MG tablet, Take 1 tablet by mouth daily., Disp: 60 tablet, Rfl: 0  •  gemfibrozil (LOPID) 600 MG tablet, Take 1 tablet by mouth Daily., Disp: 30 tablet, Rfl: 11    Past Medical History:   Diagnosis Date   • CAD (coronary artery disease)    • Diabetes mellitus (CMS/HCC)    • Dyslipidemia    •  "Hyperlipidemia    • Hypertension    • Hypothyroidism    • Myocardial infarction (CMS/HCC)        Past Surgical History:   Procedure Laterality Date   • CARDIAC CATHETERIZATION  07/03/2018   • CARDIAC SURGERY     • CORONARY ANGIOPLASTY WITH STENT PLACEMENT      4 stents  over several years - first in 2002   • SKIN BIOPSY      shoulder left       Family History   Problem Relation Age of Onset   • Cancer Mother         skin   • COPD Mother    • Hyperlipidemia Mother    • Hypertension Mother    • Cancer Father         lung   • Diabetes Father    • Heart disease Father    • Hyperlipidemia Father    • Hypertension Father    • Early death Brother         50   • Alcohol abuse Brother    • Colon cancer Neg Hx    • Colon polyps Neg Hx        Social History     Tobacco Use   • Smoking status: Never Smoker   • Smokeless tobacco: Never Used   Substance Use Topics   • Alcohol use: Yes     Alcohol/week: 7.0 standard drinks     Types: 7 Shots of liquor per week   • Drug use: Not on file         ECG 12 Lead  Date/Time: 2/11/2020 2:09 PM  Performed by: Chinyere Sprague MD  Authorized by: Chinyere Sprague MD   Comparison: compared with previous ECG   Comparison to previous ECG: Inferolateral T wave inversions are new  Rhythm: sinus rhythm  T inversion: II, III, aVF, V4, V5 and V6  Other findings: left ventricular hypertrophy               Objective:     Visit Vitals  /88 (BP Location: Right arm, Patient Position: Sitting)   Pulse 64   Ht 170.2 cm (67\")   Wt 77.7 kg (171 lb 6.4 oz)   BMI 26.85 kg/m²         Physical Exam   Constitutional: He is oriented to person, place, and time. He appears well-developed and well-nourished.   HENT:   Head: Normocephalic and atraumatic.   Neck: No JVD present. Carotid bruit is not present.   Cardiovascular: Normal rate, regular rhythm, S1 normal and S2 normal. Exam reveals no gallop.   No murmur heard.  Pulses:       Radial pulses are 2+ on the right side, and 2+ on the left side.   No bilateral " lower extremity edema   Pulmonary/Chest: Effort normal and breath sounds normal.   Abdominal: Soft. Normal appearance.   Neurological: He is alert and oriented to person, place, and time.   Skin: Skin is warm, dry and intact.   Psychiatric: He has a normal mood and affect.       Lab Review:       Assessment:          Diagnosis Plan   1. Coronary artery disease involving native coronary artery of native heart without angina pectoris     2. History of coronary artery stent placement     3. Essential hypertension     4. Hyperlipidemia LDL goal <70     5. Type 2 diabetes mellitus with hyperglycemia, without long-term current use of insulin (CMS/[facility])     6. Hypothyroidism due to acquired atrophy of thyroid            Plan:       1.  Coronary artery disease.  Status post prior PCI and drug-eluting stent placement.  He has residual moderate nonobstructive and severe small vessel disease.  His EKG today does show new inferolateral T wave inversions however he is not having any associated symptoms.  Without any new symptoms I have a high threshold to proceed with any further work-up of his EKG changes.  Will monitor him closely for now.  I asked him to notify me if he notices any development of symptoms.  In the meanwhile we will continue his current medical management.  2.  Hypertension.  Mildly elevated in the office today but normally well controlled.  We will continue to monitor on his current regimen.  3.  Hyperlipidemia.  On pravastatin which is managed by Branden Lowe NP.  He has been most recent lipid panel shows that his LDL is at goal.  His triglycerides remain elevated although they have improved and his HDL is mildly declined.  I suspect this will improve with lifestyle changes.  Encouraged the patient to get back to exercising and to be mindful of his diet.  4.  Diabetes mellitus type 2  5.  Hypothyroidism    We will plan on seeing the patient back again in 3 months.

## 2020-02-11 NOTE — PROGRESS NOTES
Notify patient that his thyroid level is still not in normal range.  He needs to increase his synthroid to 75mcg daily   And see me back in 3 months.   He can get the labs one week before seeing me.     WCN     Note: 3 mos   Tsh, ft4, a1c

## 2020-02-12 RX ORDER — LEVOTHYROXINE SODIUM 0.07 MG/1
75 TABLET ORAL DAILY
Qty: 90 TABLET | Refills: 1 | Status: SHIPPED | OUTPATIENT
Start: 2020-02-12 | End: 2020-08-26

## 2020-02-24 ENCOUNTER — TELEPHONE (OUTPATIENT)
Dept: INTERNAL MEDICINE | Facility: CLINIC | Age: 70
End: 2020-02-24

## 2020-02-24 NOTE — TELEPHONE ENCOUNTER
Called pt and lvm explaining that he would need to be seen to be tested for strep to see if that is the cause. I advised pt that he could call and schedule an apt for tomorrow or he could come in for the walk in clinic this afternoon 2-330.

## 2020-02-24 NOTE — TELEPHONE ENCOUNTER
PATIENT CALLED REQUESTING TO SPEAK WITH ELTON HANEY'S NURSE. THE PATIENT STATED THAT HE WAS IN OFFICE ABOUT 2 WEEKS AGO (2/6/20) FOR AN APPOINTMENT WITH ELTON HANEY, AND HE HAD A COLD AT THE TIME.    THE PATIENT STATED THAT HE HAS HAD A SORE THROAT FOR ABOUT A WEEK NOW, AND REQUESTED FOR ELTON HANEY TO WRITE HIM A PRESCRIPTION FOR AN ANTIBIOTIC TO TREAT THIS. THE PATIENT STATED THAT HE IS NOT EXPERIENCING ANY OTHER SYMPTOMS OTHER THAN A SORE THROAT.    I CONFIRMED THE CORRECT PHARMACY WITH THE PATIENT TO BE SHUN ON [facility].    IF THERE ARE ANY QUESTIONS OR CONCERNS PLEASE CALL THE PATIENT -[phone] OR THE PHARMACY AT [facility] -[phone].

## 2020-03-03 ENCOUNTER — OFFICE VISIT (OUTPATIENT)
Dept: INTERNAL MEDICINE | Facility: CLINIC | Age: 70
End: 2020-03-03

## 2020-03-03 VITALS
DIASTOLIC BLOOD PRESSURE: 78 MMHG | WEIGHT: 172 LBS | OXYGEN SATURATION: 98 % | RESPIRATION RATE: 16 BRPM | HEIGHT: 67 IN | HEART RATE: 63 BPM | TEMPERATURE: 98.2 F | BODY MASS INDEX: 27 KG/M2 | SYSTOLIC BLOOD PRESSURE: 130 MMHG

## 2020-03-03 DIAGNOSIS — J02.9 ACUTE PHARYNGITIS, UNSPECIFIED ETIOLOGY: Primary | ICD-10-CM

## 2020-03-03 PROCEDURE — 99213 OFFICE O/P EST LOW 20 MIN: CPT | Performed by: NURSE PRACTITIONER

## 2020-03-03 RX ORDER — METOPROLOL SUCCINATE 25 MG/1
12.5 TABLET, EXTENDED RELEASE ORAL DAILY
Qty: 45 TABLET | Refills: 3 | Status: SHIPPED | OUTPATIENT
Start: 2020-03-03 | End: 2020-03-06

## 2020-03-03 RX ORDER — AMOXICILLIN 500 MG/1
500 CAPSULE ORAL 3 TIMES DAILY
Qty: 21 CAPSULE | Refills: 0 | Status: SHIPPED | OUTPATIENT
Start: 2020-03-03 | End: 2020-03-10

## 2020-03-03 NOTE — PROGRESS NOTES
Name: Jesus Cuba  :  1950    Subjective:      Chief Complaint   Patient presents with   • Sore Throat        Jesus Cuba is a 69 y.o. male who is here for sore throat.     Sore Throat    This is a new problem. The current episode started 1 to 4 weeks ago. The problem has been waxing and waning. Sore throat worse side: both. The maximum temperature recorded prior to his arrival was 100.4 - 100.9 F. The pain is at a severity of 4/10. The pain is mild. Associated symptoms include congestion and a hoarse voice. Pertinent negatives include no coughing, shortness of breath, swollen glands or trouble swallowing. He has had no exposure to strep or mono. He has tried acetaminophen and oral narcotic analgesics for the symptoms. The treatment provided mild relief.   Has postnasal drip and had flonase which was helping but he stopped.     I have reviewed the patient's medical history in detail and updated the computerized patient record.    Past Medical History:   Diagnosis Date   • CAD (coronary artery disease)    • Diabetes mellitus (CMS/HCC)    • Dyslipidemia    • Hyperlipidemia    • Hypertension    • Hypothyroidism    • Myocardial infarction (CMS/HCC)        Past Surgical History:   Procedure Laterality Date   • CARDIAC CATHETERIZATION  2018   • CARDIAC SURGERY     • CORONARY ANGIOPLASTY WITH STENT PLACEMENT      4 stents  over several years - first in    • SKIN BIOPSY      shoulder left       Family History   Problem Relation Age of Onset   • Cancer Mother         skin   • COPD Mother    • Hyperlipidemia Mother    • Hypertension Mother    • Cancer Father         lung   • Diabetes Father    • Heart disease Father    • Hyperlipidemia Father    • Hypertension Father    • Early death Brother         50   • Alcohol abuse Brother    • Colon cancer Neg Hx    • Colon polyps Neg Hx        Social History     Socioeconomic History   • Marital status: Single     Spouse name: Not on file   • Number  of children: Not on file   • Years of education: Not on file   • Highest education level: Not on file   Tobacco Use   • Smoking status: Never Smoker   • Smokeless tobacco: Never Used   Substance and Sexual Activity   • Alcohol use: Yes     Alcohol/week: 7.0 standard drinks     Types: 7 Shots of liquor per week   • Sexual activity: Defer       Most Recent Immunizations   Administered Date(s) Administered   • Fluzone High Dose =>65 Years ([employer] ONLY) 01/25/2018         Review of Systems:   Review of Systems   Constitutional: Positive for fever. Negative for chills.   HENT: Positive for congestion, hoarse voice and sore throat. Negative for trouble swallowing.    Respiratory: Negative for cough, shortness of breath and wheezing.    Cardiovascular: Negative for chest pain.   Musculoskeletal: Negative for arthralgias.         Objective:      Physical Exam:   Physical Exam   Constitutional: He is oriented to person, place, and time. He appears well-developed. He is cooperative.   HENT:   Nose: Nose normal.   Mouth/Throat: Mucous membranes are normal. Posterior oropharyngeal erythema present. No oropharyngeal exudate or tonsillar abscesses. No tonsillar exudate.   Neck: Normal range of motion. No thyromegaly present.   Cardiovascular: Normal rate, regular rhythm, normal heart sounds, intact distal pulses and normal pulses.   Pulmonary/Chest: Effort normal and breath sounds normal. He exhibits no deformity.   Equal, Unlabored   Abdominal: Soft. Bowel sounds are normal.   Lymphadenopathy:     He has no cervical adenopathy.   Neurological: He is alert and oriented to person, place, and time.   Skin: Capillary refill takes 2 to 3 seconds.   Psychiatric: He has a normal mood and affect.   Vitals reviewed.        Vital Signs:  Vitals:    03/03/20 1100 03/03/20 1152   BP: 148/84 130/78   BP Location: Left arm Left arm   Patient Position: Sitting    Cuff Size: Small Adult    Pulse: 63    Resp: 16    Temp: 98.2 °F (36.8 °C)   "  TempSrc: Oral    SpO2: 98%    Weight: 78 kg (172 lb)    Height: 170.2 cm (67\")      Body mass index is 26.94 kg/m².            Requested Prescriptions     Signed Prescriptions Disp Refills   • amoxicillin (AMOXIL) 500 MG capsule 21 capsule 0     Sig: Take 1 capsule by mouth 3 (Three) Times a Day for 7 days.     Routine medications provided by this office will also be refilled via pharmacy request.       Current Outpatient Medications:   •  ALPRAZolam (XANAX) 0.5 MG tablet, TAKE 1 TABLET BY MOUTH TWICE DAILY AS NEEDED FOR ANXIETY, Disp: 60 tablet, Rfl: 3  •  aspirin 81 MG tablet, Take 81 mg by mouth daily., Disp: , Rfl:   •  clopidogrel (PLAVIX) 75 MG tablet, Take 1 tablet by mouth daily., Disp: 90 tablet, Rfl: 1  •  Dapagliflozin Propanediol (FARXIGA) 10 MG tablet, Take 10 mg by mouth Daily., Disp: 30 tablet, Rfl: 6  •  fluticasone (FLONASE) 50 MCG/ACT nasal spray, 2 sprays into the nostril(s) as directed by provider Daily., Disp: 1 bottle, Rfl: 2  •  gemfibrozil (LOPID) 600 MG tablet, Take 1 tablet by mouth Daily., Disp: 30 tablet, Rfl: 11  •  glucose blood (ONE TOUCH ULTRA TEST) test strip, USE 1 STRIP TO CHECK GLUCOSE TWICE DAILY, Disp: 100 each, Rfl: 11  •  irbesartan (AVAPRO) 300 MG tablet, TAKE 1 TABLET BY MOUTH ONCE DAILY, Disp: 90 tablet, Rfl: 3  •  isosorbide mononitrate (IMDUR) 30 MG 24 hr tablet, Take 30 mg by mouth 2 (Two) Times a Day., Disp: , Rfl: 1  •  levothyroxine (SYNTHROID) 75 MCG tablet, Take 1 tablet by mouth Daily., Disp: 90 tablet, Rfl: 1  •  metFORMIN (GLUCOPHAGE) 1000 MG tablet, TAKE 1 TABLET BY MOUTH TWICE DAILY WITH MEALS, Disp: 180 tablet, Rfl: 1  •  metoprolol succinate XL (TOPROL-XL) 25 MG 24 hr tablet, TAKE 1/2 (ONE-HALF) TABLET BY MOUTH ONCE DAILY, Disp: , Rfl:   •  NITROSTAT 0.4 MG SL tablet, DISSOLVE ONE TABLET UNDER THE TONGUE AS NEEDED, Disp: 25 tablet, Rfl: 0  •  pravastatin (PRAVACHOL) 80 MG tablet, TAKE 1 TABLET BY MOUTH ONCE DAILY, Disp: 90 tablet, Rfl: 1  •  ranolazine " "(RANEXA) 500 MG 12 hr tablet, Take 1 tablet by mouth 2 (Two) Times a Day., Disp: 180 tablet, Rfl: 3  •  sitaGLIPtin (JANUVIA) 100 MG tablet, Take 1 tablet by mouth daily., Disp: 60 tablet, Rfl: 0  •  amoxicillin (AMOXIL) 500 MG capsule, Take 1 capsule by mouth 3 (Three) Times a Day for 7 days., Disp: 21 capsule, Rfl: 0       Assessment and Plan:        Problem List Items Addressed This Visit     None      Visit Diagnoses     Acute pharyngitis, unspecified etiology    -  Primary        New - given fever and duration, will start on amoxicillin.   See patient instructions.     Discussed any change in Rx and discussed visit with patient.  All questions answered.      Return if symptoms worsen or fail to improve.    Branden \"Darien\" Chrissy, APRN   03/03/20    Dragon disclaimer:   Much of this encounter note is an electronic transcription/translation of spoken language to printed text. The electronic translation of spoken language may permit erroneous, or at times, nonsensical words or phrases to be inadvertently transcribed; Although I have reviewed the note for such errors, some may still exist.     Additional Patient Counseling:       Patient Instructions   Warm salt water rinses 3 times a day and continue Flonase for 30 days.         Pharyngitis    Pharyngitis is a sore throat (pharynx). This is when there is redness, pain, and swelling in your throat. Most of the time, this condition gets better on its own. In some cases, you may need medicine.  Follow these instructions at home:  · Take over-the-counter and prescription medicines only as told by your doctor.  ? If you were prescribed an antibiotic medicine, take it as told by your doctor. Do not stop taking the antibiotic even if you start to feel better.  ? Do not give children aspirin. Aspirin has been linked to Reye syndrome.  · Drink enough water and fluids to keep your pee (urine) clear or pale yellow.  · Get a lot of rest.  · Rinse your mouth (gargle) with a " salt-water mixture 3-4 times a day or as needed. To make a salt-water mixture, completely dissolve ½-1 tsp of salt in 1 cup of warm water.  · If your doctor approves, you may use throat lozenges or sprays to soothe your throat.  Contact a doctor if:  · You have large, tender lumps in your neck.  · You have a rash.  · You cough up green, yellow-brown, or bloody spit.  Get help right away if:  · You have a stiff neck.  · You drool or cannot swallow liquids.  · You cannot drink or take medicines without throwing up.  · You have very bad pain that does not go away with medicine.  · You have problems breathing, and it is not from a stuffy nose.  · You have new pain and swelling in your knees, ankles, wrists, or elbows.  Summary  · Pharyngitis is a sore throat (pharynx). This is when there is redness, pain, and swelling in your throat.  · If you were prescribed an antibiotic medicine, take it as told by your doctor. Do not stop taking the antibiotic even if you start to feel better.  · Most of the time, pharyngitis gets better on its own. Sometimes, you may need medicine.  This information is not intended to replace advice given to you by your health care provider. Make sure you discuss any questions you have with your health care provider.  Document Released: 06/05/2009 Document Revised: 01/23/2018 Document Reviewed: 01/23/2018  [employer] Interactive Patient Education © 2020 [employer]

## 2020-03-03 NOTE — PATIENT INSTRUCTIONS
Warm salt water rinses 3 times a day and continue Flonase for 30 days.         Pharyngitis    Pharyngitis is a sore throat (pharynx). This is when there is redness, pain, and swelling in your throat. Most of the time, this condition gets better on its own. In some cases, you may need medicine.  Follow these instructions at home:  · Take over-the-counter and prescription medicines only as told by your doctor.  ? If you were prescribed an antibiotic medicine, take it as told by your doctor. Do not stop taking the antibiotic even if you start to feel better.  ? Do not give children aspirin. Aspirin has been linked to Reye syndrome.  · Drink enough water and fluids to keep your pee (urine) clear or pale yellow.  · Get a lot of rest.  · Rinse your mouth (gargle) with a salt-water mixture 3-4 times a day or as needed. To make a salt-water mixture, completely dissolve ½-1 tsp of salt in 1 cup of warm water.  · If your doctor approves, you may use throat lozenges or sprays to soothe your throat.  Contact a doctor if:  · You have large, tender lumps in your neck.  · You have a rash.  · You cough up green, yellow-brown, or bloody spit.  Get help right away if:  · You have a stiff neck.  · You drool or cannot swallow liquids.  · You cannot drink or take medicines without throwing up.  · You have very bad pain that does not go away with medicine.  · You have problems breathing, and it is not from a stuffy nose.  · You have new pain and swelling in your knees, ankles, wrists, or elbows.  Summary  · Pharyngitis is a sore throat (pharynx). This is when there is redness, pain, and swelling in your throat.  · If you were prescribed an antibiotic medicine, take it as told by your doctor. Do not stop taking the antibiotic even if you start to feel better.  · Most of the time, pharyngitis gets better on its own. Sometimes, you may need medicine.  This information is not intended to replace advice given to you by your health care  provider. Make sure you discuss any questions you have with your health care provider.  Document Released: 06/05/2009 Document Revised: 01/23/2018 Document Reviewed: 01/23/2018  Elsevier Interactive Patient Education © 2020 [employer]

## 2020-04-01 ENCOUNTER — TELEPHONE (OUTPATIENT)
Dept: INTERNAL MEDICINE | Facility: CLINIC | Age: 70
End: 2020-04-01

## 2020-04-01 DIAGNOSIS — F41.9 ANXIETY: ICD-10-CM

## 2020-04-01 RX ORDER — ALPRAZOLAM 0.5 MG/1
0.5 TABLET ORAL 2 TIMES DAILY PRN
Qty: 60 TABLET | Refills: 0 | Status: SHIPPED | OUTPATIENT
Start: 2020-04-01 | End: 2020-06-08

## 2020-04-01 NOTE — TELEPHONE ENCOUNTER
Patient's controlled medication refilled before regular follow up visit due to COVID19 for patient safety.   Patient will have regular follow-up once health crisis has settled.

## 2020-04-01 NOTE — TELEPHONE ENCOUNTER
Notify patient hat xanax was sent to pharmacy.   We do not have samples as vendors are not able to come into office at this time.      LATESHA

## 2020-04-01 NOTE — TELEPHONE ENCOUNTER
PATIENT STATES THAT HE THINKS HE THREW HIS XANAX IN THE TRASH AND WAS INQUIRING IF HE COULD GET A NEW ONE. ALSO PATIENT WANTS TO KNOW IF THERE ARE IN SAMPLES IN THE OFFICE OF sitaGLIPtin (JANUVIA) 100 MG tablet AND FARXIGA 10 MG    PLEASE ADVISE

## 2020-04-22 RX ORDER — PRAVASTATIN SODIUM 80 MG/1
80 TABLET ORAL DAILY
Qty: 90 TABLET | Refills: 1 | Status: SHIPPED | OUTPATIENT
Start: 2020-04-22 | End: 2020-10-26

## 2020-04-22 NOTE — TELEPHONE ENCOUNTER
Pt called requesting refills on the selected meds.    [facility]- confirmed    Pt callback- [phone]

## 2020-05-04 ENCOUNTER — RESULTS ENCOUNTER (OUTPATIENT)
Dept: INTERNAL MEDICINE | Facility: CLINIC | Age: 70
End: 2020-05-04

## 2020-05-04 DIAGNOSIS — E11.65 TYPE 2 DIABETES MELLITUS WITH HYPERGLYCEMIA, WITHOUT LONG-TERM CURRENT USE OF INSULIN (HCC): ICD-10-CM

## 2020-05-04 DIAGNOSIS — E03.4 HYPOTHYROIDISM DUE TO ACQUIRED ATROPHY OF THYROID: ICD-10-CM

## 2020-05-13 NOTE — TELEPHONE ENCOUNTER
PATIENT REQUESTED A REFILL ON:irbesartan (AVAPRO) 300 MG tablet    PATIENT CAN BE REACHED ON: [phone]    PHARMACY PREFERRED: he would like for it to be emailed to him [email or web address]

## 2020-05-14 NOTE — TELEPHONE ENCOUNTER
Pt called back to check the status on getting the Rx of irbesartan (AVAPRO) 300 MG tablet sent to his that he can it sent to his mail in company.

## 2020-05-18 ENCOUNTER — TELEPHONE (OUTPATIENT)
Dept: INTERNAL MEDICINE | Facility: CLINIC | Age: 70
End: 2020-05-18

## 2020-05-18 RX ORDER — IRBESARTAN 300 MG/1
300 TABLET ORAL DAILY
Qty: 90 TABLET | Refills: 0 | Status: SHIPPED | OUTPATIENT
Start: 2020-05-18 | End: 2020-09-03 | Stop reason: SDUPTHER

## 2020-05-18 NOTE — TELEPHONE ENCOUNTER
Patient calling to check status of his refill on irbesartan (AVAPRO) 300 MG tablet. He called this in on 5- but had glucose blood (ONE TOUCH ULTRA TEST) test strip sent in instead of the irbesartan (AVAPRO) 300 MG tablet and he is unsure why. States he has only a week left of his blood pressure medication and needs this faxed to [facility] ASAP because it takes 2 weeks to be sent to his house.    Refill:  irbesartan (AVAPRO) 300 MG tablet  Pharmacy:  [facility]    Fax to [facility] at [phone].    Also e-mail patient a copy of the script: [email or web address]    Please call patient when taken care of at [phone](C).

## 2020-05-19 ENCOUNTER — OFFICE VISIT (OUTPATIENT)
Dept: CARDIOLOGY | Facility: CLINIC | Age: 70
End: 2020-05-19

## 2020-05-19 VITALS
HEIGHT: 67 IN | WEIGHT: 170.2 LBS | HEART RATE: 68 BPM | RESPIRATION RATE: 18 BRPM | BODY MASS INDEX: 26.71 KG/M2 | OXYGEN SATURATION: 98 % | SYSTOLIC BLOOD PRESSURE: 118 MMHG | DIASTOLIC BLOOD PRESSURE: 60 MMHG

## 2020-05-19 DIAGNOSIS — I25.10 CORONARY ARTERY DISEASE INVOLVING NATIVE CORONARY ARTERY OF NATIVE HEART WITHOUT ANGINA PECTORIS: Primary | ICD-10-CM

## 2020-05-19 DIAGNOSIS — E03.4 HYPOTHYROIDISM DUE TO ACQUIRED ATROPHY OF THYROID: ICD-10-CM

## 2020-05-19 DIAGNOSIS — E78.5 HYPERLIPIDEMIA LDL GOAL <70: ICD-10-CM

## 2020-05-19 DIAGNOSIS — Z95.5 HISTORY OF CORONARY ARTERY STENT PLACEMENT: ICD-10-CM

## 2020-05-19 DIAGNOSIS — E11.65 TYPE 2 DIABETES MELLITUS WITH HYPERGLYCEMIA, WITHOUT LONG-TERM CURRENT USE OF INSULIN (HCC): ICD-10-CM

## 2020-05-19 DIAGNOSIS — I10 ESSENTIAL HYPERTENSION: ICD-10-CM

## 2020-05-19 PROCEDURE — 93000 ELECTROCARDIOGRAM COMPLETE: CPT | Performed by: INTERNAL MEDICINE

## 2020-05-19 PROCEDURE — 99214 OFFICE O/P EST MOD 30 MIN: CPT | Performed by: INTERNAL MEDICINE

## 2020-05-19 NOTE — PROGRESS NOTES
Subjective:     Encounter Date:05/19/2020      Patient ID: Jesus Cuba is a 70 y.o. male.    Chief Complaint:  History of Present Illness    This is a 70-year-old with a history of coronary artery disease status post prior stents to the mid LAD, proximal posterior descending artery, and first diagonal branch, hypertension, hyperlipidemia, diabetes mellitus type 2, hypothyroidism, who presents for follow-up.     He presents today for routine 3-month follow-up.  He continues to deny any symptoms of chest tightness, shortness of breath, palpitations, orthopnea, near-syncope or syncope, or lower extremity edema.  He walks a couple times a week which is less than what he was doing before but continues to feel well with this.  He denies any issues with his medications.    Prior History:  The patient was previously followed by Dr. Curiel and then most recently Dr. Tobar.  His prior cardiac history includes that of stents to his mid LAD, proximal posterior descending branch, and most recently stenting of his first diagonal branch in 7/2018 with a resolute 2.0 x 30 mm stent.  At the time of that cardiac catheterization he was noted to have 30 to 40% stenosis at the proximal edge of his widely patent mid LAD stent, severe diffuse disease of a small left circumflex artery, mild nonobstructive disease of a moderate caliber ramus branch, mild nonobstructive disease of his right coronary artery, patent stent of the proximal posterior descending branch with 50 to 60% stenosis at the proximal edge of the stent and 40 to 50% stenosis of the distal edge of the stent.  At the time of the cardiac catheterization Dr. Curiel recommended further evaluation of the posterior descending artery disease if he continued to have symptoms.       He was then seen by Dr. Tobar initially in 1/2019 when he presented for a second opinion.  At that time he reported pinpoint chest discomfort in his left chest that got better in the past after prior  coronary interventions but at the time of his office visit was still occurring despite his recent stent placement.  He also reported dyspnea on exertion and chest tightness which had improved since his stent placement.  At the time of that office visit Dr. Tobar felt that he had class I angina that should be treated medically.  He was already on isosorbide mononitrate 30 mg a day and additionally he was started on a low-dose of metoprolol tartrate and ranolazine 500 mg twice a day.  He then saw YAMILA Fregoso in follow-up in 8/2019 at which time he was feeling really well.  He denies any further anginal symptoms since the initiation of medical therapy and was able to walk 1-1/2 to 2 miles 3 times a week without any significant issues.     I saw him initially in 2/2020 at which time he was not having any significant symptoms.  He was exercising regular basis.  However his EKG did show new inferior ST changes with T wave inversions.  Due to his lack of symptoms I opted to just monitor him before proceeding with any further work-up.    Review of Systems   Constitution: Negative for malaise/fatigue.   HENT: Negative for hearing loss, hoarse voice, nosebleeds and sore throat.    Eyes: Negative for pain.   Cardiovascular: Negative for chest pain, claudication, cyanosis, dyspnea on exertion, irregular heartbeat, leg swelling, near-syncope, orthopnea, palpitations, paroxysmal nocturnal dyspnea and syncope.   Respiratory: Negative for shortness of breath and snoring.    Endocrine: Negative for cold intolerance, heat intolerance, polydipsia, polyphagia and polyuria.   Skin: Negative for itching and rash.   Musculoskeletal: Negative for arthritis, falls, joint pain, joint swelling, muscle cramps, muscle weakness and myalgias.   Gastrointestinal: Negative for constipation, diarrhea, dysphagia, heartburn, hematemesis, hematochezia, melena, nausea and vomiting.   Genitourinary: Negative for frequency, hematuria and hesitancy.    Neurological: Negative for excessive daytime sleepiness, dizziness, headaches, light-headedness, numbness and weakness.   Psychiatric/Behavioral: Negative for depression. The patient is not nervous/anxious.          Current Outpatient Medications:   •  ALPRAZolam (XANAX) 0.5 MG tablet, Take 1 tablet by mouth 2 (Two) Times a Day As Needed for Anxiety. for anxiety, Disp: 60 tablet, Rfl: 0  •  aspirin 81 MG tablet, Take 81 mg by mouth daily., Disp: , Rfl:   •  clopidogrel (PLAVIX) 75 MG tablet, Take 1 tablet by mouth daily., Disp: 90 tablet, Rfl: 1  •  Dapagliflozin Propanediol (Farxiga) 10 MG tablet, Take 10 mg by mouth Daily., Disp: 90 tablet, Rfl: 1  •  fluticasone (FLONASE) 50 MCG/ACT nasal spray, 2 sprays into the nostril(s) as directed by provider Daily., Disp: 1 bottle, Rfl: 2  •  glucose blood (ONE TOUCH ULTRA TEST) test strip, USE 1 STRIP TO CHECK GLUCOSE TWICE DAILY, Disp: 100 each, Rfl: 11  •  irbesartan (AVAPRO) 300 MG tablet, Take 1 tablet by mouth Daily., Disp: 90 tablet, Rfl: 0  •  isosorbide mononitrate (IMDUR) 30 MG 24 hr tablet, Take 30 mg by mouth 2 (Two) Times a Day., Disp: , Rfl: 1  •  levothyroxine (SYNTHROID) 75 MCG tablet, Take 1 tablet by mouth Daily., Disp: 90 tablet, Rfl: 1  •  metFORMIN (GLUCOPHAGE) 1000 MG tablet, TAKE 1 TABLET BY MOUTH TWICE DAILY WITH MEALS, Disp: 180 tablet, Rfl: 1  •  metoprolol tartrate (LOPRESSOR) 25 MG tablet, Take 1 tablet by mouth 2 (Two) Times a Day., Disp: 180 tablet, Rfl: 1  •  NITROSTAT 0.4 MG SL tablet, DISSOLVE ONE TABLET UNDER THE TONGUE AS NEEDED, Disp: 25 tablet, Rfl: 0  •  pravastatin (PRAVACHOL) 80 MG tablet, Take 1 tablet by mouth Daily., Disp: 90 tablet, Rfl: 1  •  ranolazine (RANEXA) 500 MG 12 hr tablet, Take 1 tablet by mouth 2 (Two) Times a Day., Disp: 180 tablet, Rfl: 3  •  sitaGLIPtin (JANUVIA) 100 MG tablet, Take 1 tablet by mouth daily., Disp: 60 tablet, Rfl: 0  •  gemfibrozil (LOPID) 600 MG tablet, Take 1 tablet by mouth Daily., Disp: 30  "tablet, Rfl: 11    Past Medical History:   Diagnosis Date   • CAD (coronary artery disease)    • Diabetes mellitus (CMS/HCC)    • Dyslipidemia    • Hyperlipidemia    • Hypertension    • Hypothyroidism    • Myocardial infarction (CMS/HCC)        Past Surgical History:   Procedure Laterality Date   • CARDIAC CATHETERIZATION  07/03/2018   • CARDIAC SURGERY     • CORONARY ANGIOPLASTY WITH STENT PLACEMENT      4 stents  over several years - first in 2002   • SKIN BIOPSY      shoulder left       Family History   Problem Relation Age of Onset   • Cancer Mother         skin   • COPD Mother    • Hyperlipidemia Mother    • Hypertension Mother    • Cancer Father         lung   • Diabetes Father    • Heart disease Father    • Hyperlipidemia Father    • Hypertension Father    • Early death Brother         50   • Alcohol abuse Brother    • Colon cancer Neg Hx    • Colon polyps Neg Hx        Social History     Tobacco Use   • Smoking status: Never Smoker   • Smokeless tobacco: Never Used   Substance Use Topics   • Alcohol use: Yes     Alcohol/week: 7.0 standard drinks     Types: 7 Shots of liquor per week   • Drug use: Not on file         ECG 12 Lead  Date/Time: 5/19/2020 2:35 PM  Performed by: Chinyere Sprague MD  Authorized by: Chinyere Sprague MD   Comparison: compared with previous ECG   Comparison to previous ECG: Inferolateral T wave inversions have resolved  Rhythm: sinus rhythm  T flattening: II, III, aVF and V6                 Objective:     Visit Vitals  /60 (BP Location: Left arm, Patient Position: Sitting)   Pulse 68   Resp 18   Ht 170.2 cm (67\")   Wt 77.2 kg (170 lb 3.2 oz)   SpO2 98%   BMI 26.66 kg/m²         Physical Exam   Constitutional: He is oriented to person, place, and time. He appears well-developed and well-nourished.   HENT:   Head: Normocephalic and atraumatic.   Neck: No JVD present. Carotid bruit is not present.   Cardiovascular: Normal rate, regular rhythm, S1 normal and S2 normal. Exam reveals " no gallop.   No murmur heard.  Pulses:       Radial pulses are 2+ on the right side, and 2+ on the left side.   Pulmonary/Chest: Effort normal and breath sounds normal.   Abdominal: Soft. Normal appearance.   Musculoskeletal: He exhibits no edema.   Neurological: He is alert and oriented to person, place, and time.   Skin: Skin is warm, dry and intact.   Psychiatric: He has a normal mood and affect.           Assessment:          Diagnosis Plan   1. Coronary artery disease involving native coronary artery of native heart without angina pectoris     2. History of coronary artery stent placement     3. Essential hypertension     4. Hyperlipidemia LDL goal <70     5. Type 2 diabetes mellitus with hyperglycemia, without long-term current use of insulin (CMS/[facility])     6. Hypothyroidism due to acquired atrophy of thyroid            Plan:         1.  Coronary artery disease.  His EKG has normalized today.  It is unclear what caused his EKG changes during his last visit but I suspect it may be related to his blood pressures being mildly elevated at that visit versus today's normal blood pressures.  Continue current medical management.  2.  Hypertension.  Well-controlled on his current medications.  3.  Hyperlipidemia.  On high-dose pravastatin for a goal LDL of around 70 or below.    4.  Diabetes mellitus type 2  5.  Hypothyroidism    We will plan on seeing the patient back again in 6 months.

## 2020-05-26 ENCOUNTER — LAB (OUTPATIENT)
Dept: INTERNAL MEDICINE | Facility: CLINIC | Age: 70
End: 2020-05-26

## 2020-05-26 DIAGNOSIS — E78.5 HYPERLIPIDEMIA LDL GOAL <70: ICD-10-CM

## 2020-05-26 LAB
HBA1C MFR BLD: 8.2 % (ref 4.8–5.6)
T4 FREE SERPL-MCNC: 1.3 NG/DL (ref 0.93–1.7)
TSH SERPL DL<=0.005 MIU/L-ACNC: 4.97 UIU/ML (ref 0.27–4.2)

## 2020-05-27 DIAGNOSIS — E78.5 HYPERLIPIDEMIA LDL GOAL <70: Primary | ICD-10-CM

## 2020-05-27 DIAGNOSIS — E11.65 TYPE 2 DIABETES MELLITUS WITH HYPERGLYCEMIA, WITHOUT LONG-TERM CURRENT USE OF INSULIN (HCC): Primary | ICD-10-CM

## 2020-05-27 LAB
BUN SERPL-MCNC: 19 MG/DL (ref 8–23)
BUN/CREAT SERPL: 19.6 (ref 7–25)
CALCIUM SERPL-MCNC: 9.6 MG/DL (ref 8.6–10.5)
CHLORIDE SERPL-SCNC: 101 MMOL/L (ref 98–107)
CHOLEST SERPL-MCNC: 163 MG/DL (ref 0–200)
CO2 SERPL-SCNC: 21.9 MMOL/L (ref 22–29)
CREAT SERPL-MCNC: 0.97 MG/DL (ref 0.76–1.27)
GLUCOSE SERPL-MCNC: 181 MG/DL (ref 65–99)
HDLC SERPL-MCNC: 40 MG/DL (ref 40–60)
LDLC SERPL CALC-MCNC: 48 MG/DL (ref 0–100)
LDLC/HDLC SERPL: 1.21 {RATIO}
POTASSIUM SERPL-SCNC: 4.7 MMOL/L (ref 3.5–5.2)
SODIUM SERPL-SCNC: 137 MMOL/L (ref 136–145)
TRIGL SERPL-MCNC: 374 MG/DL (ref 0–150)
VLDLC SERPL CALC-MCNC: 74.8 MG/DL (ref 5–40)

## 2020-06-04 LAB
REF LAB TEST METHOD: NORMAL
REF LAB TEST METHOD: NORMAL

## 2020-06-08 DIAGNOSIS — F41.9 ANXIETY: ICD-10-CM

## 2020-06-08 RX ORDER — ALPRAZOLAM 0.5 MG/1
TABLET ORAL
Qty: 60 TABLET | Refills: 0 | Status: SHIPPED | OUTPATIENT
Start: 2020-06-08 | End: 2020-09-09

## 2020-06-19 ENCOUNTER — TELEPHONE (OUTPATIENT)
Dept: CARDIOLOGY | Facility: CLINIC | Age: 70
End: 2020-06-19

## 2020-06-19 NOTE — TELEPHONE ENCOUNTER
Patient has been informed.  He will wait for his medication from [place].  I did instruct him to call us if he develops any symptoms.

## 2020-06-19 NOTE — TELEPHONE ENCOUNTER
Please let him know that the ranexa is primarily used to treat symptoms of chest pain and it is ok for him to be off of it as long as he is not having any recurrent symptoms of chest pain.

## 2020-06-19 NOTE — TELEPHONE ENCOUNTER
Patient called today stating that he has been out of the medication for about 10 days now, and is concerned that this isn't good for him.  He gets his medications through [facility] out of [place] and they are telling him that it's being held up in [place].    We no longer have samples, but I have priced the medication on [employer] and lowest price is $39.  I haven't called patient back yet.  I wanted to see what you thought about him being off the medication for that long, as I'm sure he will ask.    Thank you!

## 2020-06-29 ENCOUNTER — OFFICE VISIT (OUTPATIENT)
Dept: INTERNAL MEDICINE | Facility: CLINIC | Age: 70
End: 2020-06-29

## 2020-06-29 VITALS
BODY MASS INDEX: 27.97 KG/M2 | WEIGHT: 174 LBS | HEART RATE: 66 BPM | RESPIRATION RATE: 15 BRPM | TEMPERATURE: 97.8 F | OXYGEN SATURATION: 99 % | HEIGHT: 66 IN | SYSTOLIC BLOOD PRESSURE: 140 MMHG | DIASTOLIC BLOOD PRESSURE: 70 MMHG

## 2020-06-29 DIAGNOSIS — I10 ESSENTIAL HYPERTENSION: Primary | ICD-10-CM

## 2020-06-29 DIAGNOSIS — E78.5 HYPERLIPIDEMIA LDL GOAL <70: ICD-10-CM

## 2020-06-29 DIAGNOSIS — E11.65 TYPE 2 DIABETES MELLITUS WITH HYPERGLYCEMIA, WITHOUT LONG-TERM CURRENT USE OF INSULIN (HCC): ICD-10-CM

## 2020-06-29 DIAGNOSIS — E03.4 HYPOTHYROIDISM DUE TO ACQUIRED ATROPHY OF THYROID: ICD-10-CM

## 2020-06-29 DIAGNOSIS — I25.10 CORONARY ARTERY DISEASE INVOLVING NATIVE CORONARY ARTERY OF NATIVE HEART WITHOUT ANGINA PECTORIS: ICD-10-CM

## 2020-06-29 PROBLEM — I20.0 UNSTABLE ANGINA: Status: ACTIVE | Noted: 2018-07-03

## 2020-06-29 PROCEDURE — 99214 OFFICE O/P EST MOD 30 MIN: CPT | Performed by: NURSE PRACTITIONER

## 2020-06-29 RX ORDER — METOPROLOL SUCCINATE 25 MG/1
12.5 TABLET, EXTENDED RELEASE ORAL DAILY
COMMUNITY
Start: 2020-06-26 | End: 2021-05-14 | Stop reason: SDUPTHER

## 2020-06-29 NOTE — ASSESSMENT & PLAN NOTE
Diabetes is worsening.   Discussed foot care.  Reminded to get yearly retinal exam.  Diabetes will be reassessed in 3 months.    Will recheck in 3 months.  If not improved - + trulicity ?     Not certain he has been taking his januvia due to costs.

## 2020-06-29 NOTE — ASSESSMENT & PLAN NOTE
Coronary artery disease is improving with treatment.  Continue current treatment regimen.  Dietary sodium restriction.  Weight loss.  Regular aerobic exercise.  Cardiac status will be reassessed in 3 months.

## 2020-06-29 NOTE — PROGRESS NOTES
Name: Jesus Cuba  :  1950    Subjective:      Chief Complaint   Patient presents with   • Anxiety     3 month follow-up   • Diabetes Mellitus   • Hyperlipidemia   • Hypertension   • Hypothyroidism        Jesus Cuba is a 70 y.o. male patient.  He has multiple chronic medical conditions including: hypertension, HLD, DMII, CAD (stent to LAD), GERD, Anxiety, hypothyroid       Last seen by me on 2020  He had follow up with Cardiology on 20 - no rx changes.   He has no new complaints.      Hypertension   This is a chronic problem. The current episode started more than 1 year ago. The problem has been gradually improving since onset. The problem is controlled. Associated symptoms include anxiety. Pertinent negatives include no chest pain, headaches, palpitations, peripheral edema or shortness of breath. Agents associated with hypertension include thyroid hormones. Risk factors for coronary artery disease include diabetes mellitus, dyslipidemia, family history, obesity and male gender. Past treatments include angiotensin blockers and beta blockers. Current antihypertension treatment includes beta blockers and angiotensin blockers. The current treatment provides mild improvement. Compliance problems include diet and exercise.  Hypertensive end-organ damage includes CAD/MI. Identifiable causes of hypertension include a thyroid problem.       Hyperlipidemia   This is a chronic problem. The current episode started more than 1 year ago. The problem is uncontrolled. Exacerbating diseases include diabetes, hypothyroidism and obesity. Factors aggravating his hyperlipidemia include beta blockers. Pertinent negatives include no chest pain, myalgias or shortness of breath. Current antihyperlipidemic treatment includes statins and fibric acid derivatives. The current treatment provides mild improvement of lipids. Compliance problems include adherence to exercise and adherence to diet.  Risk factors  for coronary artery disease include diabetes mellitus, dyslipidemia, family history, male sex, obesity and hypertension.     Hypothyroidism   This is a chronic problem. The current episode started more than 1 year ago. The problem occurs constantly. The problem has been gradually improving. Associated symptoms include congestion. Pertinent negatives include no chest pain, coughing, fatigue, headaches, myalgias or visual change. He continues synthroid 75 mcg daily (increased at last visit 2/2020) and feels it is a good dose.     Diabetes   He presents for his follow-up diabetic visit. He has type 2 diabetes mellitus. No MedicAlert identification noted. His disease course has been worsening. Hypoglycemia symptoms include nervousness/anxiousness. Pertinent negatives for hypoglycemia include no headaches. Pertinent negatives for diabetes include no chest pain, no fatigue, no foot ulcerations, no polydipsia, no polyphagia, no polyuria and no visual change. There are no hypoglycemic complications. Symptoms are worsening. Diabetic complications include heart disease and impotence. Risk factors for coronary artery disease include obesity, male sex, hypertension, family history, dyslipidemia and diabetes mellitus. He is following a generally unhealthy diet. He has not had a previous visit with a dietitian. He participates in exercise intermittently. His home blood glucose trend is fluctuating dramatically. An ACE inhibitor/angiotensin II receptor blocker is being taken. He does not see a podiatrist.Eye exam is current (vision works last summer (yearly).   5/26: A1C =8.2%  Up from 7.7%   Does not want rx changes.  Diet has not been good since covid.     Anxiety   Presents for follow-up visit. Symptoms include impotence and nervous/anxious behavior. Patient reports no chest pain, palpitations or shortness of breath. Symptoms occur rarely. The severity of symptoms is mild. The quality of sleep is fair. Nighttime awakenings:  occasional.      Takes xanax 2-5 times a month.  States will get panic at times due to his history of heart disease and this will calm his stress.      CAD  Chronic.  Was being seen by Dr Curiel but switched to Dr Tobar in 2019. He has intermittent chest discomfort. Dr Tobar started him on ranexa and metoprolol for small vessel disease . His angina has improved. Dr Tobar recently left and now he will be seeing Dr Sprague.   No use of nitro.         I have reviewed the patient's medical history in detail and updated the computerized patient record.    Past Medical History:   Diagnosis Date   • CAD (coronary artery disease)    • Diabetes mellitus (CMS/HCC)    • Dyslipidemia    • Hyperlipidemia    • Hypertension    • Hypothyroidism    • Myocardial infarction (CMS/HCC)        Past Surgical History:   Procedure Laterality Date   • CARDIAC CATHETERIZATION  07/03/2018   • CARDIAC SURGERY     • CORONARY ANGIOPLASTY WITH STENT PLACEMENT      4 stents  over several years - first in 2002   • SKIN BIOPSY      shoulder left       Family History   Problem Relation Age of Onset   • Cancer Mother         skin   • COPD Mother    • Hyperlipidemia Mother    • Hypertension Mother    • Cancer Father         lung   • Diabetes Father    • Heart disease Father    • Hyperlipidemia Father    • Hypertension Father    • Early death Brother         50   • Alcohol abuse Brother    • Colon cancer Neg Hx    • Colon polyps Neg Hx        Social History     Socioeconomic History   • Marital status: Single     Spouse name: Not on file   • Number of children: Not on file   • Years of education: Not on file   • Highest education level: Not on file   Tobacco Use   • Smoking status: Never Smoker   • Smokeless tobacco: Never Used   Substance and Sexual Activity   • Alcohol use: Yes     Alcohol/week: 7.0 standard drinks     Types: 7 Shots of liquor per week   • Drug use: Never   • Sexual activity: Yes     Partners: Female       Most Recent Immunizations  "  Administered Date(s) Administered   • Fluzone High Dose =>65 Years ([employer] ONLY) 01/25/2018         Review of Systems:   Review of Systems   Constitutional: Negative for unexpected weight change.   Respiratory: Negative for shortness of breath.    Cardiovascular: Negative for chest pain, palpitations and leg swelling.   Gastrointestinal: Negative for abdominal pain.   Genitourinary: Negative for difficulty urinating.   Psychiatric/Behavioral: The patient is nervous/anxious.          Objective:      Physical Exam:   Physical Exam   Constitutional: He is oriented to person, place, and time. He appears well-developed. He is cooperative.   Eyes: Pupils are equal, round, and reactive to light. Conjunctivae are normal.   Neck: Normal range of motion. No thyromegaly present.   Cardiovascular: Normal rate, regular rhythm, normal heart sounds, intact distal pulses and normal pulses.   Pulmonary/Chest: Effort normal and breath sounds normal. He exhibits no deformity.   Equal, Unlabored   Abdominal: Soft. Bowel sounds are normal.   Musculoskeletal: Normal range of motion. He exhibits no edema.   Lymphadenopathy:     He has no cervical adenopathy.   Neurological: He is alert and oriented to person, place, and time.   Skin: Skin is warm and dry. Capillary refill takes 2 to 3 seconds.   Psychiatric: He has a normal mood and affect. His behavior is normal. Judgment and thought content normal.   Vitals reviewed.        Vital Signs:  Vitals:    06/29/20 0951   BP: 140/70   BP Location: Left arm   Patient Position: Sitting   Cuff Size: Adult   Pulse: 66   Resp: 15   Temp: 97.8 °F (36.6 °C)   TempSrc: Oral   SpO2: 99%   Weight: 78.9 kg (174 lb)   Height: 166.4 cm (65.5\")     Body mass index is 28.51 kg/m².      Results Review:      REVIEWED AND DISCUSSED LAB RESULTS WITH PATIENT    Lab Results   Component Value Date    HGBA1C 8.20 (H) 05/26/2020       Requested Prescriptions      No prescriptions requested or ordered in this " encounter     Routine medications provided by this office will also be refilled via pharmacy request.       Current Outpatient Medications:   •  ALPRAZolam (XANAX) 0.5 MG tablet, Take 1 tablet by mouth twice daily as needed for anxiety, Disp: 60 tablet, Rfl: 0  •  aspirin 81 MG tablet, Take 81 mg by mouth daily., Disp: , Rfl:   •  clopidogrel (PLAVIX) 75 MG tablet, Take 1 tablet by mouth daily., Disp: 90 tablet, Rfl: 1  •  Dapagliflozin Propanediol (Farxiga) 10 MG tablet, Take 10 mg by mouth Daily., Disp: 90 tablet, Rfl: 1  •  fluticasone (FLONASE) 50 MCG/ACT nasal spray, 2 sprays into the nostril(s) as directed by provider Daily., Disp: 1 bottle, Rfl: 2  •  gemfibrozil (LOPID) 600 MG tablet, Take 1 tablet by mouth Daily., Disp: 30 tablet, Rfl: 11  •  glucose blood (ONE TOUCH ULTRA TEST) test strip, USE 1 STRIP TO CHECK GLUCOSE TWICE DAILY, Disp: 100 each, Rfl: 6  •  irbesartan (AVAPRO) 300 MG tablet, Take 1 tablet by mouth Daily., Disp: 90 tablet, Rfl: 0  •  isosorbide mononitrate (IMDUR) 30 MG 24 hr tablet, Take 30 mg by mouth 2 (Two) Times a Day., Disp: , Rfl: 1  •  levothyroxine (SYNTHROID) 75 MCG tablet, Take 1 tablet by mouth Daily., Disp: 90 tablet, Rfl: 1  •  metFORMIN (GLUCOPHAGE) 1000 MG tablet, TAKE 1 TABLET BY MOUTH TWICE DAILY WITH MEALS, Disp: 180 tablet, Rfl: 1  •  metoprolol succinate XL (TOPROL-XL) 25 MG 24 hr tablet, Take 12.5 mg by mouth Daily., Disp: , Rfl:   •  NITROSTAT 0.4 MG SL tablet, DISSOLVE ONE TABLET UNDER THE TONGUE AS NEEDED, Disp: 25 tablet, Rfl: 0  •  pravastatin (PRAVACHOL) 80 MG tablet, Take 1 tablet by mouth Daily., Disp: 90 tablet, Rfl: 1  •  ranolazine (RANEXA) 500 MG 12 hr tablet, Take 1 tablet by mouth 2 (Two) Times a Day., Disp: 180 tablet, Rfl: 3  •  sitaGLIPtin (JANUVIA) 100 MG tablet, Take 1 tablet by mouth daily., Disp: 60 tablet, Rfl: 0       Assessment and Plan:        Problem List Items Addressed This Visit        Cardiovascular and Mediastinum    CAD (coronary artery  "disease)     Coronary artery disease is improving with treatment.  Continue current treatment regimen.  Dietary sodium restriction.  Weight loss.  Regular aerobic exercise.  Cardiac status will be reassessed in 3 months.         Relevant Medications    metoprolol succinate XL (TOPROL-XL) 25 MG 24 hr tablet    Hyperlipidemia LDL goal <70     Lipid abnormalities are improving with treatment.  Pharmacotherapy as ordered.  Lipids will be reassessed in 3 months.         Hypertension - Primary     Hypertension is improving with treatment.  Continue current treatment regimen.  Dietary sodium restriction.  Weight loss.  Regular aerobic exercise.  Blood pressure will be reassessed in 3 months.         Relevant Medications    metoprolol succinate XL (TOPROL-XL) 25 MG 24 hr tablet       Endocrine    Hypothyroidism     Improving with increased dose - recheck in 3 months         Relevant Medications    metoprolol succinate XL (TOPROL-XL) 25 MG 24 hr tablet    Type 2 diabetes mellitus with hyperglycemia, without long-term current use of insulin (CMS/[facility])     Diabetes is worsening.   Discussed foot care.  Reminded to get yearly retinal exam.  Diabetes will be reassessed in 3 months.    Will recheck in 3 months.  If not improved - + trulicity ?     Not certain he has been taking his januvia due to costs.                   Discussed any change in Rx and discussed visit with patient.  All questions answered.      Return in about 3 months (around 9/29/2020) for Medicare Wellness.    Branden \"Darien\" Chrissy, APRN   06/29/20    Dragon disclaimer:   Much of this encounter note is an electronic transcription/translation of spoken language to printed text. The electronic translation of spoken language may permit erroneous, or at times, nonsensical words or phrases to be inadvertently transcribed; Although I have reviewed the note for such errors, some may still exist.     Additional Patient Counseling:       Patient Instructions "     Diabetic eye exam yearly.  Please have provider to send a copy of the note to our office.  Fax: [phone]    Your last A1C was   Lab Results   Component Value Date    HGBA1C 8.20 (H) 05/26/2020   .  This is a 3 month average of your blood sugar.  Your goal is to be LESS than 7%.     It's Summer Time    Stay hydrated when outside  If your urine starts to get darker, you are not drinking enough     Protect yourself from ticks and mosquitos  Here are the best ingredients to look for:  · DEET (N,N-diethyl-m-toluamide or N,N-diethyl-3-methyl-benzamide)  · Picaridin  · Oil of lemon eucalyptus (p-menthane-3,8-diol or PMD)  Wear light-colored pants so you can spot ticks easier  If you use a permethrin product, ONLY apply to clothing    Practice Safe Sun!    · Use sun screen SPF >30 daily, reapply regularly per directions on package  · See dermatologist for skin check regularly  · Protect your eyes with sunglasses with UV protection    Poison Ivy  If you are going into areas that may have poison ivy, prepare with a product like IvyX or other ivy blocker.  Wash your clothes and pets after being in an area with ivy when returning home. If you come in contact with poison ivy, try a product like Technu     Other things you should incorporate all year...     Diet:    • Eat vegetables, fruits, whole grain, low-fat dairy, poultry, fish, beans, nontropical vegetable oils, and nuts, but avoid red meat (i.e., Mediterranean-style diet, DASH [Dietary Approaches to Stop Hypertension] diet).  • Limit sugary drinks and sweets.  • Limit saturated and trans fat to 5% to 6% of calories.  • Limit sodium intake to 2,400 mg daily (about one teaspoon table salt [kosher/sea salt have less sodium per teaspoon]).  Weight loss / Calorie Counting Apps:    • Lose It!   • MyFitness Pal   • Works great when you try it with a partner/ friend  Exercise:   • Engage in moderate-to-vigorous aerobic activity for at least 40 minutes (on average) three to  four times each week.  Wearables:   • Activity tracker   • Step tracker: getting 7,500 steps daily can cut your cardiac risks by 44%   Bone Health:   • [email or web address]://[email or web address]/patients/treatment/[email or web address]  • Routine weight bearing exercise  Vaccines:     Shingles vaccine is given in TWO separate injections.   CDC recommends that healthy adults 50 years and older get two doses of the shingles vaccine called Shingrix (recombinant zoster vaccine),  by 2 to 6 months, to prevent shingles and the complications from the disease.

## 2020-06-29 NOTE — PATIENT INSTRUCTIONS
Diabetic eye exam yearly.  Please have provider to send a copy of the note to our office.  Fax: [phone]    Your last A1C was   Lab Results   Component Value Date    HGBA1C 8.20 (H) 05/26/2020   .  This is a 3 month average of your blood sugar.  Your goal is to be LESS than 7%.     It's Summer Time    Stay hydrated when outside  If your urine starts to get darker, you are not drinking enough     Protect yourself from ticks and mosquitos  Here are the best ingredients to look for:  · DEET (N,N-diethyl-m-toluamide or N,N-diethyl-3-methyl-benzamide)  · Picaridin  · Oil of lemon eucalyptus (p-menthane-3,8-diol or PMD)  Wear light-colored pants so you can spot ticks easier  If you use a permethrin product, ONLY apply to clothing    Practice Safe Sun!    · Use sun screen SPF >30 daily, reapply regularly per directions on package  · See dermatologist for skin check regularly  · Protect your eyes with sunglasses with UV protection    Poison Ivy  If you are going into areas that may have poison ivy, prepare with a product like IvyX or other ivy blocker.  Wash your clothes and pets after being in an area with ivy when returning home. If you come in contact with poison ivy, try a product like Technu     Other things you should incorporate all year...     Diet:    • Eat vegetables, fruits, whole grain, low-fat dairy, poultry, fish, beans, nontropical vegetable oils, and nuts, but avoid red meat (i.e., Mediterranean-style diet, DASH [Dietary Approaches to Stop Hypertension] diet).  • Limit sugary drinks and sweets.  • Limit saturated and trans fat to 5% to 6% of calories.  • Limit sodium intake to 2,400 mg daily (about one teaspoon table salt [kosher/sea salt have less sodium per teaspoon]).  Weight loss / Calorie Counting Apps:    • Lose It!   • MyFitness Pal   • Works great when you try it with a partner/ friend  Exercise:   • Engage in moderate-to-vigorous aerobic activity for at least 40 minutes (on average) three to  four times each week.  Wearables:   • Activity tracker   • Step tracker: getting 7,500 steps daily can cut your cardiac risks by 44%   Bone Health:   • [email or web address]://[email or web address]/patients/treatment/nutrition/  • Routine weight bearing exercise  Vaccines:     Shingles vaccine is given in TWO separate injections.   CDC recommends that healthy adults 50 years and older get two doses of the shingles vaccine called Shingrix (recombinant zoster vaccine),  by 2 to 6 months, to prevent shingles and the complications from the disease.

## 2020-07-21 ENCOUNTER — TELEPHONE (OUTPATIENT)
Dept: INTERNAL MEDICINE | Facility: CLINIC | Age: 70
End: 2020-07-21

## 2020-07-21 DIAGNOSIS — E11.9 DIABETES MELLITUS WITHOUT COMPLICATION (HCC): Primary | ICD-10-CM

## 2020-07-21 NOTE — TELEPHONE ENCOUNTER
Call made to patient to inform him that the Metformin he's on isn't the one that's recalled and he will be ok taking it, and he will have a rx sent in.    He stated he understood and thank you.

## 2020-07-21 NOTE — TELEPHONE ENCOUNTER
PATIENT WANTED 3 MONTH SUPPLY OF METFORMIN 1000MG REFILLED    IS THIS MEDICATION RECALLED?    PLEASE ADVISE PATIENT ASAP:   [phone]    Pharmacy:  [id number] - [place], [place] - [facility] [facility] - [phone]  - [phone] [[id number]]

## 2020-08-26 ENCOUNTER — OFFICE VISIT (OUTPATIENT)
Dept: INTERNAL MEDICINE | Facility: CLINIC | Age: 70
End: 2020-08-26

## 2020-08-26 DIAGNOSIS — R06.02 SHORTNESS OF BREATH: ICD-10-CM

## 2020-08-26 DIAGNOSIS — K21.9 GASTROESOPHAGEAL REFLUX DISEASE WITHOUT ESOPHAGITIS: Primary | ICD-10-CM

## 2020-08-26 PROCEDURE — 99442 PR PHYS/QHP TELEPHONE EVALUATION 11-20 MIN: CPT | Performed by: NURSE PRACTITIONER

## 2020-08-26 RX ORDER — LEVOTHYROXINE SODIUM 75 UG/1
TABLET ORAL
Qty: 90 TABLET | Refills: 0 | Status: SHIPPED | OUTPATIENT
Start: 2020-08-26 | End: 2021-06-10

## 2020-08-26 NOTE — PROGRESS NOTES
Name: Jesus Cuba  :  1950  Provider: Rigoberto Lowe III, NP-C     Subjective:      Chief Complaint   Patient presents with   • Abdominal Pain   • Shortness of Breath        Jesus Cuba is a 70 y.o. male who has scheduled an Telephone Visit.     You have chosen to receive care through a telephone visit today. Do you consent to use a telephone visit for your medical care today? Yes     Patient with CAD, diabetes type 2 on 3 medications.  States for over the last month occasionally after dinner he feels bloated, has increased belching, when he stands up and starts to walk he feels stomach tightness and a little short of breath that resolves when he sits down.  He states this only happened a few times this past month, typically after heavier meal.  The next day he can get up and walk 2 miles symptom-free.  No increasing swelling, weight gain.  He states that he can sleep flat at night with no problem.  The pain does not radiate.  No cough or sputum, no fever or chills.  Belching helps.    He has concerned because his father had lung cancer.  He denies cough or hemoptysis.  No unexpected weight loss.      I have reviewed the patient's medical history in detail and updated the computerized patient record.    Past Medical History:   Diagnosis Date   • CAD (coronary artery disease)    • Diabetes mellitus (CMS/HCC)    • Dyslipidemia    • Hyperlipidemia    • Hypertension    • Hypothyroidism    • Myocardial infarction (CMS/HCC)        Past Surgical History:   Procedure Laterality Date   • CARDIAC CATHETERIZATION  2018   • CARDIAC SURGERY     • CORONARY ANGIOPLASTY WITH STENT PLACEMENT      4 stents  over several years - first in    • SKIN BIOPSY      shoulder left       Family History   Problem Relation Age of Onset   • Cancer Mother         skin   • COPD Mother    • Hyperlipidemia Mother    • Hypertension Mother    • Cancer Father         lung   • Diabetes Father    • Heart  disease Father    • Hyperlipidemia Father    • Hypertension Father    • Early death Brother         50   • Alcohol abuse Brother    • Colon cancer Neg Hx    • Colon polyps Neg Hx        Social History     Socioeconomic History   • Marital status: Single     Spouse name: Not on file   • Number of children: Not on file   • Years of education: Not on file   • Highest education level: Not on file   Tobacco Use   • Smoking status: Never Smoker   • Smokeless tobacco: Never Used   Substance and Sexual Activity   • Alcohol use: Yes     Alcohol/week: 7.0 standard drinks     Types: 7 Shots of liquor per week   • Drug use: Never   • Sexual activity: Yes     Partners: Female       Most Recent Immunizations   Administered Date(s) Administered   • Fluzone High Dose =>65 Years ([employer] ONLY) 01/25/2018         Review of Systems:   Review of Systems   Constitutional: Negative for chills, fever and unexpected weight change.   Respiratory: Positive for shortness of breath. Negative for cough and wheezing.    Cardiovascular: Negative for chest pain, palpitations and leg swelling.   Gastrointestinal: Positive for abdominal pain. Negative for constipation, diarrhea, nausea and vomiting.   Genitourinary: Negative for difficulty urinating.   Neurological: Negative for dizziness and weakness.         Objective:      Physical Exam:   NO Physical exam due to telephone visit  As this is a telephone check-in, the ability to perform a routine physical exam is extremely limited. The patient seems alert and is oriented appropriately.   On this phone call there is not any evidence of respiratory distress.   The patient seems of normal mood.   The remainder of a routine physical exam is deferred.      Vital Signs:  NO Vitals due to telephone visit     Results Review:      REVIEWED AND DISCUSSED LAB RESULTS WITH PATIENT      Requested Prescriptions      No prescriptions requested or ordered in this encounter     Routine medications provided by this  office will also be refilled via pharmacy request.       Current Outpatient Medications:   •  ALPRAZolam (XANAX) 0.5 MG tablet, Take 1 tablet by mouth twice daily as needed for anxiety, Disp: 60 tablet, Rfl: 0  •  aspirin 81 MG tablet, Take 81 mg by mouth daily., Disp: , Rfl:   •  clopidogrel (PLAVIX) 75 MG tablet, Take 1 tablet by mouth daily., Disp: 90 tablet, Rfl: 1  •  Dapagliflozin Propanediol (Farxiga) 10 MG tablet, Take 10 mg by mouth Daily., Disp: 90 tablet, Rfl: 1  •  fluticasone (FLONASE) 50 MCG/ACT nasal spray, 2 sprays into the nostril(s) as directed by provider Daily., Disp: 1 bottle, Rfl: 2  •  gemfibrozil (LOPID) 600 MG tablet, Take 1 tablet by mouth Daily., Disp: 30 tablet, Rfl: 11  •  glucose blood (ONE TOUCH ULTRA TEST) test strip, USE 1 STRIP TO CHECK GLUCOSE TWICE DAILY, Disp: 100 each, Rfl: 6  •  irbesartan (AVAPRO) 300 MG tablet, Take 1 tablet by mouth Daily., Disp: 90 tablet, Rfl: 0  •  isosorbide mononitrate (IMDUR) 30 MG 24 hr tablet, Take 30 mg by mouth 2 (Two) Times a Day., Disp: , Rfl: 1  •  levothyroxine (SYNTHROID) 75 MCG tablet, Take 1 tablet by mouth Daily., Disp: 90 tablet, Rfl: 1  •  metFORMIN (GLUCOPHAGE) 1000 MG tablet, Take 1 tablet by mouth 2 (Two) Times a Day With Meals., Disp: 180 tablet, Rfl: 1  •  metoprolol succinate XL (TOPROL-XL) 25 MG 24 hr tablet, Take 12.5 mg by mouth Daily., Disp: , Rfl:   •  NITROSTAT 0.4 MG SL tablet, DISSOLVE ONE TABLET UNDER THE TONGUE AS NEEDED, Disp: 25 tablet, Rfl: 0  •  pravastatin (PRAVACHOL) 80 MG tablet, Take 1 tablet by mouth Daily., Disp: 90 tablet, Rfl: 1  •  ranolazine (RANEXA) 500 MG 12 hr tablet, Take 1 tablet by mouth 2 (Two) Times a Day., Disp: 180 tablet, Rfl: 3  •  sitaGLIPtin (JANUVIA) 100 MG tablet, Take 1 tablet by mouth daily., Disp: 60 tablet, Rfl: 0       Assessment and Plan:        Problem List Items Addressed This Visit     None      Visit Diagnoses     Gastroesophageal reflux disease without esophagitis    -  Primary     "Advised to make dinner a lighter meal and see if this helps. Start prilosec otc.   ? gastroparesis     Shortness of breath        CXR         ? Related to gerd or gastroparesis.  Does not seem cardiac as it is not on regular basis and does not happen outside of dinner.      Needs samples of januvia and farxiga - usually gets them from [facility] but right now shipping is delayed.      See patient instructions for plan.     This visit has been rescheduled as a phone visit to comply with patient safety concerns in accordance with CDC recommendations.  Total time of discussion was 15 minutes.     Discussed any change in Rx and discussed visit with patient.  All questions answered.      Return if symptoms worsen or fail to improve.    Branden \"Darien\" Chrissy, APRN   08/26/20    Dragon disclaimer:   Much of this encounter note is an electronic transcription/translation of spoken language to printed text. The electronic translation of spoken language may permit erroneous, or at times, nonsensical words or phrases to be inadvertently transcribed; Although I have reviewed the note for such errors, some may still exist.     Additional Patient Counseling:       There are no Patient Instructions on file for this visit.  "

## 2020-08-26 NOTE — TELEPHONE ENCOUNTER
----- Message from ROBBIE Stone III sent at 8/26/2020  8:16 AM EDT -----  Can you please notify him chest x-ray is ordered.      I told him I thought he could go at any time and doesn't need an appointment.  If that is true, please tell him.      Darien

## 2020-09-01 ENCOUNTER — HOSPITAL ENCOUNTER (OUTPATIENT)
Dept: GENERAL RADIOLOGY | Facility: HOSPITAL | Age: 70
Discharge: HOME | End: 2020-09-01
Admitting: NURSE PRACTITIONER

## 2020-09-01 PROCEDURE — 71046 X-RAY EXAM CHEST 2 VIEWS: CPT

## 2020-09-03 ENCOUNTER — TELEPHONE (OUTPATIENT)
Dept: INTERNAL MEDICINE | Facility: CLINIC | Age: 70
End: 2020-09-03

## 2020-09-03 RX ORDER — IRBESARTAN 300 MG/1
300 TABLET ORAL DAILY
Qty: 90 TABLET | Refills: 1 | Status: SHIPPED | OUTPATIENT
Start: 2020-09-03 | End: 2021-03-29 | Stop reason: SDUPTHER

## 2020-09-03 NOTE — TELEPHONE ENCOUNTER
PATIENT IS CALLING TO GET A PRESCRIPTION REFILL OF:       irbesartan (AVAPRO) 300 MG tablet  HE IS COMPLETELY OUT. HE USNING THE PHARMACY ON FILE.    [facility] PH#: [phone]

## 2020-09-04 DIAGNOSIS — F41.9 ANXIETY: ICD-10-CM

## 2020-09-08 NOTE — TELEPHONE ENCOUNTER
Please advise refill   Last OV 08/26/2020  Santino in process   Next scheduled appointment 11/09/2020

## 2020-09-09 RX ORDER — ALPRAZOLAM 0.5 MG/1
TABLET ORAL
Qty: 60 TABLET | Refills: 0 | Status: SHIPPED | OUTPATIENT
Start: 2020-09-09 | End: 2020-11-03

## 2020-09-09 NOTE — TELEPHONE ENCOUNTER
SUHAS query complete. Treatment plan to include limited course of prescribed  controlled substance. Risks including addiction, benefits, and alternatives presented to patient.   I am covering provider. TT

## 2020-10-26 RX ORDER — PRAVASTATIN SODIUM 80 MG/1
TABLET ORAL
Qty: 90 TABLET | Refills: 0 | Status: SHIPPED | OUTPATIENT
Start: 2020-10-26 | End: 2021-02-04 | Stop reason: SDUPTHER

## 2020-11-02 DIAGNOSIS — F41.9 ANXIETY: ICD-10-CM

## 2020-11-03 RX ORDER — ALPRAZOLAM 0.5 MG/1
TABLET ORAL
Qty: 60 TABLET | Refills: 0 | Status: SHIPPED | OUTPATIENT
Start: 2020-11-03 | End: 2021-01-25 | Stop reason: SDUPTHER

## 2020-11-09 ENCOUNTER — OFFICE VISIT (OUTPATIENT)
Dept: INTERNAL MEDICINE | Facility: CLINIC | Age: 70
End: 2020-11-09

## 2020-11-09 VITALS
BODY MASS INDEX: 27.86 KG/M2 | SYSTOLIC BLOOD PRESSURE: 126 MMHG | RESPIRATION RATE: 16 BRPM | HEART RATE: 66 BPM | WEIGHT: 167.2 LBS | DIASTOLIC BLOOD PRESSURE: 72 MMHG | HEIGHT: 65 IN | TEMPERATURE: 97.7 F | OXYGEN SATURATION: 98 %

## 2020-11-09 DIAGNOSIS — I10 ESSENTIAL HYPERTENSION: ICD-10-CM

## 2020-11-09 DIAGNOSIS — Z00.00 MEDICARE ANNUAL WELLNESS VISIT, SUBSEQUENT: Primary | ICD-10-CM

## 2020-11-09 DIAGNOSIS — I25.10 CORONARY ARTERY DISEASE INVOLVING NATIVE CORONARY ARTERY OF NATIVE HEART WITHOUT ANGINA PECTORIS: ICD-10-CM

## 2020-11-09 DIAGNOSIS — E03.4 HYPOTHYROIDISM DUE TO ACQUIRED ATROPHY OF THYROID: ICD-10-CM

## 2020-11-09 DIAGNOSIS — E78.5 HYPERLIPIDEMIA LDL GOAL <70: ICD-10-CM

## 2020-11-09 DIAGNOSIS — E11.65 TYPE 2 DIABETES MELLITUS WITH HYPERGLYCEMIA, WITHOUT LONG-TERM CURRENT USE OF INSULIN (HCC): ICD-10-CM

## 2020-11-09 DIAGNOSIS — F41.9 ANXIETY: ICD-10-CM

## 2020-11-09 LAB
ALBUMIN SERPL-MCNC: 4.3 G/DL (ref 3.5–5.2)
ALBUMIN/GLOB SERPL: 2 G/DL
ALP SERPL-CCNC: 77 U/L (ref 39–117)
ALT SERPL-CCNC: 21 U/L (ref 1–41)
AST SERPL-CCNC: 11 U/L (ref 1–40)
BILIRUB SERPL-MCNC: 0.4 MG/DL (ref 0–1.2)
BUN SERPL-MCNC: 18 MG/DL (ref 8–23)
BUN/CREAT SERPL: 18 (ref 7–25)
CALCIUM SERPL-MCNC: 9 MG/DL (ref 8.6–10.5)
CHLORIDE SERPL-SCNC: 106 MMOL/L (ref 98–107)
CHOLEST SERPL-MCNC: 144 MG/DL (ref 0–200)
CO2 SERPL-SCNC: 26.1 MMOL/L (ref 22–29)
CREAT SERPL-MCNC: 1 MG/DL (ref 0.76–1.27)
ERYTHROCYTE [DISTWIDTH] IN BLOOD BY AUTOMATED COUNT: 13.1 % (ref 12.3–15.4)
GLOBULIN SER CALC-MCNC: 2.2 GM/DL
GLUCOSE SERPL-MCNC: 172 MG/DL (ref 65–99)
HBA1C MFR BLD: 7.6 % (ref 4.8–5.6)
HCT VFR BLD AUTO: 42.9 % (ref 37.5–51)
HDLC SERPL-MCNC: 43 MG/DL (ref 40–60)
HGB BLD-MCNC: 14.6 G/DL (ref 13–17.7)
LDLC SERPL CALC-MCNC: 62 MG/DL (ref 0–100)
LDLC/HDLC SERPL: 1.23 {RATIO}
MCH RBC QN AUTO: 28.8 PG (ref 26.6–33)
MCHC RBC AUTO-ENTMCNC: 34 G/DL (ref 31.5–35.7)
MCV RBC AUTO: 84.6 FL (ref 79–97)
PLATELET # BLD AUTO: 217 10*3/MM3 (ref 140–450)
POTASSIUM SERPL-SCNC: 4.2 MMOL/L (ref 3.5–5.2)
PROT SERPL-MCNC: 6.5 G/DL (ref 6–8.5)
RBC # BLD AUTO: 5.07 10*6/MM3 (ref 4.14–5.8)
SODIUM SERPL-SCNC: 140 MMOL/L (ref 136–145)
T4 FREE SERPL-MCNC: 1.76 NG/DL (ref 0.93–1.7)
TRIGL SERPL-MCNC: 240 MG/DL (ref 0–150)
TSH SERPL DL<=0.005 MIU/L-ACNC: 1 UIU/ML (ref 0.27–4.2)
VLDLC SERPL CALC-MCNC: 39 MG/DL (ref 5–40)
WBC # BLD AUTO: 6.41 10*3/MM3 (ref 3.4–10.8)

## 2020-11-09 PROCEDURE — G0439 PPPS, SUBSEQ VISIT: HCPCS | Performed by: NURSE PRACTITIONER

## 2020-11-09 NOTE — ASSESSMENT & PLAN NOTE
Diabetes is unchanged.   Continue current treatment regimen.  Diabetes will be reassessed in 3 months.    Check A1C today and adjust tx based on labs.   Samples of januvia provided today due to costs.

## 2020-11-09 NOTE — PROGRESS NOTES
The ABCs of the Annual Wellness Visit  Subsequent Medicare Wellness Visit    Chief Complaint   Patient presents with   • Medicare Wellness-subsequent   • Hypertension       Subjective   History of Present Illness:  Jesus Cuba is a 70 y.o. male who presents for a Subsequent Medicare Wellness Visit.  He has multiple chronic medical conditions including: hypertension, HLD, DMII, CAD (stent to LAD 7/2018), GERD, Anxiety, hypothyroid      Had eye exam at [facility]: Kia lam 3 months ago: 7/17/2020    HEALTH RISK ASSESSMENT    Recent Hospitalizations:  No hospitalization(s) within the last year.    Current Medical Providers:  Patient Care Team:  Rigoberto Lowe III, NP-C as PCP - General (Family Medicine)  Chinyere Sprague MD as Consulting Physician (Cardiology)    Smoking Status:  Social History     Tobacco Use   Smoking Status Never Smoker   Smokeless Tobacco Never Used       Alcohol Consumption:  Social History     Substance and Sexual Activity   Alcohol Use Yes   • Alcohol/week: 7.0 standard drinks   • Types: 7 Shots of liquor per week       Depression Screen:   PHQ-2/PHQ-9 Depression Screening 11/9/2020   Little interest or pleasure in doing things 0   Feeling down, depressed, or hopeless 0   Total Score 0       Fall Risk Screen:  KALA Fall Risk Assessment was completed, and patient is at LOW risk for falls.Assessment completed on:11/9/2020    Health Habits and Functional and Cognitive Screening:  Functional & Cognitive Status 11/9/2020   Do you have difficulty preparing food and eating? No   Do you have difficulty bathing yourself, getting dressed or grooming yourself? No   Do you have difficulty using the toilet? No   Do you have difficulty moving around from place to place? No   Do you have trouble with steps or getting out of a bed or a chair? No   Current Diet Unhealthy Diet   Dental Exam Up to date   Eye Exam Up to date   Exercise (times per week) 3 times per week   Current  Exercise Activities Include Walking   Do you need help using the phone?  No   Are you deaf or do you have serious difficulty hearing?  No   Do you need help with transportation? No   Do you need help shopping? No   Do you need help preparing meals?  No   Do you need help with housework?  No   Do you need help with laundry? No   Do you need help taking your medications? No   Do you need help managing money? No   Do you ever drive or ride in a car without wearing a seat belt? No   Have you felt unusual stress, anger or loneliness in the last month? No   Who do you live with? Alone   If you need help, do you have trouble finding someone available to you? No   Have you been bothered in the last four weeks by sexual problems? No   Do you have difficulty concentrating, remembering or making decisions? No         Does the patient have evidence of cognitive impairment? No    Asprin use counseling:Taking ASA appropriately as indicated    Age-appropriate Screening Schedule:  Refer to the list below for future screening recommendations based on patient's age, sex and/or medical conditions. Orders for these recommended tests are listed in the plan section. The patient has been provided with a written plan.    Health Maintenance   Topic Date Due   • TDAP/TD VACCINES (1 - Tdap) 05/05/1970   • ZOSTER VACCINE (1 of 2) 05/05/2001   • INFLUENZA VACCINE  11/09/2021 (Originally 8/1/2020)   • HEMOGLOBIN A1C  11/26/2020   • DIABETIC FOOT EXAM  02/06/2021   • URINE MICROALBUMIN  02/06/2021   • LIPID PANEL  05/26/2021   • DIABETIC EYE EXAM  07/17/2021   • COLONOSCOPY  08/15/2026          The following portions of the patient's history were reviewed and updated as appropriate: allergies, current medications, past family history, past medical history, past social history, past surgical history and problem list.    Outpatient Medications Prior to Visit   Medication Sig Dispense Refill   • ALPRAZolam (XANAX) 0.5 MG tablet Take 1 tablet by  mouth twice daily as needed for anxiety 60 tablet 0   • aspirin 81 MG tablet Take 81 mg by mouth daily.     • clopidogrel (PLAVIX) 75 MG tablet Take 1 tablet by mouth daily. 90 tablet 1   • Dapagliflozin Propanediol (Farxiga) 10 MG tablet Take 10 mg by mouth Daily. 90 tablet 1   • EUTHYROX 75 MCG tablet Take 1 tablet by mouth once daily 90 tablet 0   • fluticasone (FLONASE) 50 MCG/ACT nasal spray 2 sprays into the nostril(s) as directed by provider Daily. 1 bottle 2   • gemfibrozil (LOPID) 600 MG tablet Take 1 tablet by mouth Daily. 30 tablet 11   • glucose blood (ONE TOUCH ULTRA TEST) test strip USE 1 STRIP TO CHECK GLUCOSE TWICE DAILY 100 each 6   • irbesartan (AVAPRO) 300 MG tablet Take 1 tablet by mouth Daily. 90 tablet 1   • isosorbide mononitrate (IMDUR) 30 MG 24 hr tablet Take 30 mg by mouth 2 (Two) Times a Day.  1   • metFORMIN (GLUCOPHAGE) 1000 MG tablet Take 1 tablet by mouth 2 (Two) Times a Day With Meals. 180 tablet 1   • metoprolol succinate XL (TOPROL-XL) 25 MG 24 hr tablet Take 12.5 mg by mouth Daily.     • NITROSTAT 0.4 MG SL tablet DISSOLVE ONE TABLET UNDER THE TONGUE AS NEEDED 25 tablet 0   • pravastatin (PRAVACHOL) 80 MG tablet Take 1 tablet by mouth once daily 90 tablet 0   • ranolazine (RANEXA) 500 MG 12 hr tablet Take 1 tablet by mouth 2 (Two) Times a Day. 180 tablet 3   • sitaGLIPtin (JANUVIA) 100 MG tablet Take 1 tablet by mouth daily. 60 tablet 0     No facility-administered medications prior to visit.        Patient Active Problem List   Diagnosis   • Essential hypertension   • Hypothyroidism   • Anxiety   • BPH associated with nocturia   • History of ASCVD   • Nicotine dependence in remission   • Vasculogenic erectile dysfunction   • Umbilical hernia   • Type 2 diabetes mellitus with hyperglycemia, without long-term current use of insulin (CMS/[facility])   • Hyperlipidemia LDL goal <70   • Prostate cancer screening   • Encounter for long-term (current) drug use   • Muscle strain   • CAD  "(coronary artery disease)   • History of coronary artery stent placement   • History of myocardial infarction   • Unstable angina (CMS/[facility])       Advanced Care Planning:  ACP discussion was held with the patient during this visit. Patient does not have an advance directive, information provided.    Review of Systems   Constitutional: Negative for chills, fever and unexpected weight change.   Respiratory: Negative for shortness of breath.    Cardiovascular: Negative for chest pain, palpitations and leg swelling.   Endocrine: Negative for cold intolerance, heat intolerance, polydipsia, polyphagia and polyuria.   Genitourinary: Negative.    Neurological: Negative.        Compared to one year ago, the patient feels his physical health is the same.  Compared to one year ago, the patient feels his mental health is the same.    Reviewed chart for potential of high risk medication in the elderly: yes  Reviewed chart for potential of harmful drug interactions in the elderly:yes    Objective         Vitals:    11/09/20 0751 11/09/20 0815   BP: 166/84 126/72  Comment: manual, left at rest   BP Location: Right arm    Patient Position: Sitting    Cuff Size: Adult    Pulse: 66    Resp: 16    Temp: 97.7 °F (36.5 °C)    TempSrc: Oral    SpO2: 98%    Weight: 75.8 kg (167 lb 3.2 oz)    Height: 165.1 cm (65\")        Body mass index is 27.82 kg/m².  Discussed the patient's BMI with him. The BMI is in the acceptable range.    Physical Exam  Vitals signs reviewed.   Constitutional:       Appearance: He is well-developed.   HENT:      Head: Normocephalic.      Comments: Wearing mask due to COVID   Eyes:      Conjunctiva/sclera: Conjunctivae normal.      Pupils: Pupils are equal, round, and reactive to light.   Neck:      Musculoskeletal: Normal range of motion and neck supple.      Thyroid: No thyromegaly.   Cardiovascular:      Rate and Rhythm: Normal rate and regular rhythm.      Pulses: Normal pulses.      Heart sounds: Normal heart " sounds.   Pulmonary:      Effort: Pulmonary effort is normal.      Breath sounds: Normal breath sounds.      Comments: E/U   Abdominal:      General: Bowel sounds are normal.      Palpations: Abdomen is soft.   Musculoskeletal: Normal range of motion.   Lymphadenopathy:      Cervical: No cervical adenopathy.   Skin:     General: Skin is warm and dry.      Capillary Refill: Capillary refill takes 2 to 3 seconds.   Neurological:      Mental Status: He is alert and oriented to person, place, and time.   Psychiatric:         Behavior: Behavior normal. Behavior is cooperative.         Thought Content: Thought content normal.         Judgment: Judgment normal.               Assessment/Plan   Medicare Risks and Personalized Health Plan  CMS Preventative Services Quick Reference  Advance Directive Discussion  Cardiovascular risk  Immunizations Discussed/Encouraged (specific immunizations; Td, Influenza, Pneumococcal 23 and Shingrix )    The above risks/problems have been discussed with the patient.  Pertinent information has been shared with the patient in the After Visit Summary.  Follow up plans and orders are seen below in the Assessment/Plan Section.    Diagnoses and all orders for this visit:    1. Medicare annual wellness visit, subsequent (Primary)    2. Essential hypertension  Assessment & Plan:  Hypertension is improving with treatment.  Continue current treatment regimen.  Dietary sodium restriction.  Weight loss.  Blood pressure will be reassessed at the next regular appointment.    Orders:  -     Comprehensive Metabolic Panel  -     CBC (No Diff)    3. Coronary artery disease involving native coronary artery of native heart without angina pectoris  Assessment & Plan:  Coronary artery disease is improving with treatment.  Continue current treatment regimen.  Dietary sodium restriction.  Weight loss.  Regular aerobic exercise.  Cardiac status will be reassessed in 3 months.    Still on Asa & plavix - he will  discuss with cardiology at next visit.     Orders:  -     Lipid Panel With LDL / HDL Ratio    4. Hyperlipidemia LDL goal <70  Assessment & Plan:  Lipid abnormalities are improving with treatment.  Pharmacotherapy as ordered.  Lipids will be reassessed in 6 months.    Orders:  -     Lipid Panel With LDL / HDL Ratio    5. Hypothyroidism due to acquired atrophy of thyroid  Assessment & Plan:  Stable   Check lab for ongoing therapeutic drug monitoring     Orders:  -     TSH  -     T4, free    6. Type 2 diabetes mellitus with hyperglycemia, without long-term current use of insulin (CMS/[facility])  Assessment & Plan:  Diabetes is unchanged.   Continue current treatment regimen.  Diabetes will be reassessed in 3 months.    Check A1C today and adjust tx based on labs.   Samples of januvia provided today due to costs.    Orders:  -     Hemoglobin A1c    7. Anxiety  Assessment & Plan:  Stable on current rx      Follow Up:  Return in about 3 months (around 2/9/2021).     An After Visit Summary and PPPS were given to the patient.

## 2020-11-09 NOTE — ASSESSMENT & PLAN NOTE
Coronary artery disease is improving with treatment.  Continue current treatment regimen.  Dietary sodium restriction.  Weight loss.  Regular aerobic exercise.  Cardiac status will be reassessed in 3 months.    Still on Asa & plavix - he will discuss with cardiology at next visit.

## 2020-11-09 NOTE — PATIENT INSTRUCTIONS
As a Diabetic, you need a Diabetic Eye Exam YEARLY.  Please have your provider to send a copy of the note to our office.  Fax: [phone]    Your last A1C was   Lab Results   Component Value Date    HGBA1C 8.20 (H) 2020       This is a 3 month average of your blood sugar.  Your goal is to be LESS than 7%.     For living will information, please go to this website:     [email or web address]      Medicare Wellness  Personal Prevention Plan of Service     Date of Office Visit:  2020  Encounter Provider:  Rigoberto Lowe III NP-C  Place of Service:  [facility] INTERNAL MEDICINE  Patient Name: Jesus Cuba  :  1950    As part of the Medicare Wellness portion of your visit today, we are providing you with this personalized preventive plan of services (PPPS). This plan is based upon recommendations of the [place] Preventive Services Task Force (USPSTF) and the Advisory Committee on Immunization Practices (ACIP).    This lists the preventive care services that should be considered, and provides dates of when you are due. Items listed as completed are up-to-date and do not require any further intervention.    Health Maintenance   Topic Date Due   • TDAP/TD VACCINES (1 - Tdap) 1970   • ZOSTER VACCINE (1 of 2) 2001   • INFLUENZA VACCINE  2021 (Originally 2020)   • Pneumococcal Vaccine 65+ (1 of 1 - PPSV23) 2021 (Originally 2016)   • HEMOGLOBIN A1C  2020   • DIABETIC FOOT EXAM  2021   • URINE MICROALBUMIN  2021   • LIPID PANEL  2021   • DIABETIC EYE EXAM  2021   • ANNUAL WELLNESS VISIT  2021   • COLONOSCOPY  08/15/2026   • HEPATITIS C SCREENING  Completed       Orders Placed This Encounter   Procedures   • Comprehensive Metabolic Panel   • Lipid Panel With LDL / HDL Ratio   • CBC (No Diff)   • TSH   • T4, free   • Hemoglobin A1c       Return in about 3  months (around 2/9/2021).

## 2020-11-24 ENCOUNTER — OFFICE VISIT (OUTPATIENT)
Dept: CARDIOLOGY | Facility: CLINIC | Age: 70
End: 2020-11-24

## 2020-11-24 VITALS
HEART RATE: 65 BPM | WEIGHT: 166 LBS | BODY MASS INDEX: 26.06 KG/M2 | SYSTOLIC BLOOD PRESSURE: 136 MMHG | DIASTOLIC BLOOD PRESSURE: 70 MMHG | HEIGHT: 67 IN

## 2020-11-24 DIAGNOSIS — I25.10 CORONARY ARTERY DISEASE INVOLVING NATIVE CORONARY ARTERY OF NATIVE HEART WITHOUT ANGINA PECTORIS: Primary | ICD-10-CM

## 2020-11-24 DIAGNOSIS — E11.65 TYPE 2 DIABETES MELLITUS WITH HYPERGLYCEMIA, WITHOUT LONG-TERM CURRENT USE OF INSULIN (HCC): ICD-10-CM

## 2020-11-24 DIAGNOSIS — I10 ESSENTIAL HYPERTENSION: ICD-10-CM

## 2020-11-24 DIAGNOSIS — E78.5 HYPERLIPIDEMIA LDL GOAL <70: ICD-10-CM

## 2020-11-24 DIAGNOSIS — I25.2 HISTORY OF MYOCARDIAL INFARCTION: ICD-10-CM

## 2020-11-24 DIAGNOSIS — E03.4 HYPOTHYROIDISM DUE TO ACQUIRED ATROPHY OF THYROID: ICD-10-CM

## 2020-11-24 DIAGNOSIS — Z95.5 HISTORY OF CORONARY ARTERY STENT PLACEMENT: ICD-10-CM

## 2020-11-24 PROCEDURE — 99214 OFFICE O/P EST MOD 30 MIN: CPT | Performed by: INTERNAL MEDICINE

## 2020-11-24 PROCEDURE — 93000 ELECTROCARDIOGRAM COMPLETE: CPT | Performed by: INTERNAL MEDICINE

## 2020-11-24 NOTE — PROGRESS NOTES
Subjective:     Encounter Date:11/24/2020      Patient ID: Jesus Cuba is a 70 y.o. male.    Chief Complaint:  History of Present Illness    This is a 70-year-old with a history of coronary artery disease status post prior stents to the mid LAD, proximal posterior descending artery, and first diagonal branch, hypertension, hyperlipidemia, diabetes mellitus type 2, hypothyroidism, who presents for follow-up.     He presents today for routine follow-up.  He reports about a month ago he had a couple of episodes where shortly after eating dinner he got up and walked to his car to retrieve something.  On a couple occasions this was associated with shortness of breath and upper left chest pain similar to his prior anginal symptoms.  The symptoms resolved with rest.  He has had no further episodes in the last month despite being in the same situation.  He otherwise denies any palpitations, orthopnea, near-syncope or syncope, or lower extremity edema.  He continues to walk on a regular basis although he reports he has not really walked in the last week.     Prior History:  The patient was previously followed by Dr. Curiel and then most recently Dr. Tobar.  His prior cardiac history includes that of stents to his mid LAD, proximal posterior descending branch, and most recently stenting of his first diagonal branch in 7/2018 with a resolute 2.0 x 30 mm stent.  At the time of that cardiac catheterization he was noted to have 30 to 40% stenosis at the proximal edge of his widely patent mid LAD stent, severe diffuse disease of a small left circumflex artery, mild nonobstructive disease of a moderate caliber ramus branch, mild nonobstructive disease of his right coronary artery, patent stent of the proximal posterior descending branch with 50 to 60% stenosis at the proximal edge of the stent and 40 to 50% stenosis of the distal edge of the stent.  At the time of the cardiac catheterization Dr. Curiel recommended further  evaluation of the posterior descending artery disease if he continued to have symptoms.       He was then seen by Dr. Tobar initially in 1/2019 when he presented for a second opinion.  At that time he reported pinpoint chest discomfort in his left chest that got better in the past after prior coronary interventions but at the time of his office visit was still occurring despite his recent stent placement.  He also reported dyspnea on exertion and chest tightness which had improved since his stent placement.  At the time of that office visit Dr. Tobar felt that he had class I angina that should be treated medically.  He was already on isosorbide mononitrate 30 mg a day and additionally he was started on a low-dose of metoprolol tartrate and ranolazine 500 mg twice a day.  He then saw YAMILA Fregoso in follow-up in 8/2019 at which time he was feeling really well.  He denies any further anginal symptoms since the initiation of medical therapy and was able to walk 1-1/2 to 2 miles 3 times a week without any significant issues.     I saw him initially in 2/2020 at which time he was not having any significant symptoms.  He was exercising regular basis.  However his EKG did show new inferior ST changes with T wave inversions.  Due to his lack of symptoms I opted to just monitor him before proceeding with any further work-up.  In follow-up in 5/2020 he was continuing to do well and a repeat EKG had returned to baseline.  I attributed the prior abnormalities to elevated blood pressures at that time.      Review of Systems   Constitution: Negative for malaise/fatigue.   HENT: Negative for hearing loss, hoarse voice, nosebleeds and sore throat.    Eyes: Negative for pain.   Cardiovascular: Negative for chest pain, claudication, cyanosis, dyspnea on exertion, irregular heartbeat, leg swelling, near-syncope, orthopnea, palpitations, paroxysmal nocturnal dyspnea and syncope.   Respiratory: Negative for shortness of breath and  snoring.    Endocrine: Negative for cold intolerance, heat intolerance, polydipsia, polyphagia and polyuria.   Skin: Negative for itching and rash.   Musculoskeletal: Negative for arthritis, falls, joint pain, joint swelling, muscle cramps, muscle weakness and myalgias.   Gastrointestinal: Negative for constipation, diarrhea, dysphagia, heartburn, hematemesis, hematochezia, melena, nausea and vomiting.   Genitourinary: Negative for frequency, hematuria and hesitancy.   Neurological: Negative for excessive daytime sleepiness, dizziness, headaches, light-headedness, numbness and weakness.   Psychiatric/Behavioral: Negative for depression. The patient is not nervous/anxious.          Current Outpatient Medications:   •  ALPRAZolam (XANAX) 0.5 MG tablet, Take 1 tablet by mouth twice daily as needed for anxiety, Disp: 60 tablet, Rfl: 0  •  aspirin 81 MG tablet, Take 81 mg by mouth daily., Disp: , Rfl:   •  clopidogrel (PLAVIX) 75 MG tablet, Take 1 tablet by mouth daily., Disp: 90 tablet, Rfl: 1  •  Dapagliflozin Propanediol (Farxiga) 10 MG tablet, Take 10 mg by mouth Daily., Disp: 90 tablet, Rfl: 1  •  EUTHYROX 75 MCG tablet, Take 1 tablet by mouth once daily, Disp: 90 tablet, Rfl: 0  •  fluticasone (FLONASE) 50 MCG/ACT nasal spray, 2 sprays into the nostril(s) as directed by provider Daily., Disp: 1 bottle, Rfl: 2  •  gemfibrozil (LOPID) 600 MG tablet, Take 1 tablet by mouth Daily., Disp: 30 tablet, Rfl: 11  •  glucose blood (ONE TOUCH ULTRA TEST) test strip, USE 1 STRIP TO CHECK GLUCOSE TWICE DAILY, Disp: 100 each, Rfl: 6  •  irbesartan (AVAPRO) 300 MG tablet, Take 1 tablet by mouth Daily., Disp: 90 tablet, Rfl: 1  •  isosorbide mononitrate (IMDUR) 30 MG 24 hr tablet, Take 30 mg by mouth 2 (Two) Times a Day., Disp: , Rfl: 1  •  metFORMIN (GLUCOPHAGE) 1000 MG tablet, Take 1 tablet by mouth 2 (Two) Times a Day With Meals., Disp: 180 tablet, Rfl: 1  •  metoprolol succinate XL (TOPROL-XL) 25 MG 24 hr tablet, Take 12.5 mg  "by mouth Daily., Disp: , Rfl:   •  NITROSTAT 0.4 MG SL tablet, DISSOLVE ONE TABLET UNDER THE TONGUE AS NEEDED, Disp: 25 tablet, Rfl: 0  •  pravastatin (PRAVACHOL) 80 MG tablet, Take 1 tablet by mouth once daily, Disp: 90 tablet, Rfl: 0  •  ranolazine (RANEXA) 500 MG 12 hr tablet, Take 1 tablet by mouth 2 (Two) Times a Day., Disp: 180 tablet, Rfl: 3  •  sitaGLIPtin (JANUVIA) 100 MG tablet, Take 1 tablet by mouth daily., Disp: 60 tablet, Rfl: 0    Past Medical History:   Diagnosis Date   • CAD (coronary artery disease)    • Diabetes mellitus (CMS/HCC)    • Dyslipidemia    • Hyperlipidemia    • Hypertension    • Hypothyroidism    • Myocardial infarction (CMS/HCC)        Past Surgical History:   Procedure Laterality Date   • CARDIAC CATHETERIZATION  07/03/2018   • CARDIAC SURGERY     • CORONARY ANGIOPLASTY WITH STENT PLACEMENT      4 stents  over several years - first in 2002   • SKIN BIOPSY      shoulder left       Family History   Problem Relation Age of Onset   • Cancer Mother         skin   • COPD Mother    • Hyperlipidemia Mother    • Hypertension Mother    • Cancer Father         lung   • Diabetes Father    • Heart disease Father    • Hyperlipidemia Father    • Hypertension Father    • Early death Brother         50   • Alcohol abuse Brother    • Colon cancer Neg Hx    • Colon polyps Neg Hx        Social History     Tobacco Use   • Smoking status: Never Smoker   • Smokeless tobacco: Never Used   Substance Use Topics   • Alcohol use: Yes     Alcohol/week: 7.0 standard drinks     Types: 7 Shots of liquor per week   • Drug use: Never         ECG 12 Lead    Date/Time: 11/24/2020 11:16 AM  Performed by: Chinyere Sprague MD  Authorized by: Chinyere Sprague MD   Comparison: compared with previous ECG   Similar to previous ECG  Rhythm: sinus rhythm  T inversion: II, III, aVF, V5, V6 and V4                 Objective:     Visit Vitals  /70   Pulse 65   Ht 170.2 cm (67\")   Wt 75.3 kg (166 lb)   BMI 26.00 kg/m²         " Constitutional:       Appearance: Normal appearance. Well-developed.   HENT:      Head: Normocephalic and atraumatic.   Neck:      Vascular: No carotid bruit or JVD.   Pulmonary:      Effort: Pulmonary effort is normal.      Breath sounds: Normal breath sounds.   Cardiovascular:      Normal rate. Regular rhythm.      No gallop.   Pulses:     Radial: 2+ bilaterally.  Edema:     Peripheral edema absent.   Abdominal:      Palpations: Abdomen is soft.   Skin:     General: Skin is warm and dry.   Neurological:      Mental Status: Alert and oriented to person, place, and time.             Assessment:          Diagnosis Plan   1. Coronary artery disease involving native coronary artery of native heart without angina pectoris     2. History of coronary artery stent placement     3. History of myocardial infarction     4. Essential hypertension     5. Hyperlipidemia LDL goal <70     6. Type 2 diabetes mellitus with hyperglycemia, without long-term current use of insulin (CMS/[facility])     7. Hypothyroidism due to acquired atrophy of thyroid            Plan:       1.  Coronary artery disease.  Had a couple episodes of anginal type symptoms about a month ago but nothing since then.  Will monitor for recurrence for now.  In the meanwhile continue current medical management.  Due to the presence of severe small vessel disease and multiple stents I recommended that he remain on dual antiplatelet therapy as long as he tolerates.  Continue ranolazine, isosorbide mono nitrate, and metoprolol succinate.  2.  Hypertension.  Well-controlled on his current regimen of medications.  3.  Hyperlipidemia.  On pravastatin for goal LDL of 70 or below.    4.  Diabetes mellitus type 2  5.  Hypothyroidism    We will plan on seeing the patient back again in 6 months.

## 2021-01-08 ENCOUNTER — TELEPHONE (OUTPATIENT)
Dept: INTERNAL MEDICINE | Facility: CLINIC | Age: 71
End: 2021-01-08

## 2021-01-08 DIAGNOSIS — E11.9 DIABETES MELLITUS WITHOUT COMPLICATION (HCC): ICD-10-CM

## 2021-01-08 RX ORDER — LEVOTHYROXINE SODIUM 0.07 MG/1
75 TABLET ORAL DAILY
Qty: 90 TABLET | Refills: 1 | Status: SHIPPED | OUTPATIENT
Start: 2021-01-08 | End: 2021-06-18

## 2021-01-08 NOTE — TELEPHONE ENCOUNTER
[facility] WITH [facility] CALLED INQUIRING ABOUT REQUEST TO REFILL     LEVOTHYROXINE AND METFORMIN    LEVOTHYROXINE NOT ON CURRENT MED LIST AND UNKNOWN DOSAGE    [phone]

## 2021-01-19 ENCOUNTER — OFFICE VISIT (OUTPATIENT)
Dept: INTERNAL MEDICINE | Facility: CLINIC | Age: 71
End: 2021-01-19

## 2021-01-19 VITALS
BODY MASS INDEX: 24.17 KG/M2 | WEIGHT: 154 LBS | HEART RATE: 64 BPM | SYSTOLIC BLOOD PRESSURE: 134 MMHG | DIASTOLIC BLOOD PRESSURE: 76 MMHG | TEMPERATURE: 97.6 F | OXYGEN SATURATION: 99 % | HEIGHT: 67 IN | RESPIRATION RATE: 16 BRPM

## 2021-01-19 DIAGNOSIS — G58.9 MONONEUROPATHY: Primary | ICD-10-CM

## 2021-01-19 DIAGNOSIS — E11.65 TYPE 2 DIABETES MELLITUS WITH HYPERGLYCEMIA, WITHOUT LONG-TERM CURRENT USE OF INSULIN (HCC): Chronic | ICD-10-CM

## 2021-01-19 PROBLEM — G62.9 PN (PERIPHERAL NEUROPATHY): Status: ACTIVE | Noted: 2021-01-19

## 2021-01-19 PROBLEM — Z02.89 MEDICATION MANAGEMENT CONTRACT SIGNED: Status: ACTIVE | Noted: 2021-01-19

## 2021-01-19 PROCEDURE — 99214 OFFICE O/P EST MOD 30 MIN: CPT | Performed by: NURSE PRACTITIONER

## 2021-01-19 RX ORDER — GABAPENTIN 100 MG/1
CAPSULE ORAL
Qty: 90 CAPSULE | Refills: 2 | Status: SHIPPED | OUTPATIENT
Start: 2021-01-19 | End: 2021-01-19 | Stop reason: SDUPTHER

## 2021-01-19 RX ORDER — GABAPENTIN 100 MG/1
CAPSULE ORAL
Qty: 90 CAPSULE | Refills: 0 | Status: SHIPPED | OUTPATIENT
Start: 2021-01-19 | End: 2021-02-15 | Stop reason: DRUGHIGH

## 2021-01-19 NOTE — ASSESSMENT & PLAN NOTE
Start gabapentin: start with one nightly and then can work up to three nightly.       He declines labs as he will be here next in 2 weeks - will also check B-12 at that time.

## 2021-01-19 NOTE — ASSESSMENT & PLAN NOTE
Diabetes is unchanged.   He is on farxiga as insurance would not cover invokana.    He has a new insurance and will switch back to invokana 300 mg when he has used current supply of farxiga.     Goal A1C less than 7%    Chronic problem with systemic symptom    Lab Results   Component Value Date    HGBA1C 7.60 (H) 11/09/2020

## 2021-01-19 NOTE — PROGRESS NOTES
Chief Complaint  Foot Pain    Subjective:      History of Present Illness {CC  Problem List  Visit  Diagnosis   Encounters  Notes  Medications  Labs  Result Review Imaging  Media :23}     Jesus Cuba presents to [facility] for burning in feet.     Pt with DM.   Uncontrolled. Last A1C=7.6%   States when he gets in bed - starts having burning/ tingling when he gets in bed.    No loss of motor function.       Objective:      Physical Exam  Cardiovascular:      Pulses:           Posterior tibial pulses are 1+ on the right side and 1+ on the left side.   Musculoskeletal:      Right foot: Normal range of motion. No bunion.      Left foot: Normal range of motion. No bunion.   Feet:      Right foot:      Protective Sensation: 5 sites tested. 5 sites sensed.      Skin integrity: Skin integrity normal.      Toenail Condition: Right toenails are normal.      Left foot:      Protective Sensation: 5 sites tested. 5 sites sensed.      Skin integrity: Skin integrity normal.      Toenail Condition: Left toenails are normal.      Comments: Diabetic Foot Exam Performed and Monofilament Test Performed       Result Review  Data Reviewed:{ Labs  Result Review  Imaging  Med Tab  Media :23}   The following data was reviewed by: Rigoberto Lowe III, NP-C on 01/19/2021  Lab Results - Last 18 Months   Lab Units 11/09/20  0836 05/26/20  1008 02/06/20  0932 11/05/19  1125   GLUCOSE mg/dL 172* 181* 190* 207*   BUN mg/dL 18 19 24* 16   CREATININE mg/dL 1.00 0.97 0.90 0.82   EGFR IF NONAFRICN AM mL/min/1.73 74 77 84 93   EGFR IF AFRICN AM mL/min/1.73 90 93 102 113   SODIUM mmol/L 140 137 138 139   POTASSIUM mmol/L 4.2 4.7 4.2 4.6   CHLORIDE mmol/L 106 101 102 100   CALCIUM mg/dL 9.0 9.6 9.3 9.6   ALBUMIN g/dL 4.30  --   --  4.80   BILIRUBIN mg/dL 0.4  --   --  0.5   ALK PHOS U/L 77  --   --  80   AST (SGOT) U/L 11  --   --  11   ALT (SGPT) U/L 21  --   --  19   CHOLESTEROL  "mg/dL 144 163 166 183   TRIGLYCERIDES mg/dL 240* 374* 320* 462*   HDL CHOL mg/dL 43 40 38* 43   VLDL CHOL mg/dL  --  74.8* 64 CANCELED   VLDL CHOLESTEROL RUBY mg/dL 39  --   --   --    LDL CHOL mg/dL 62 48 64 CANCELED   LDL/HDL RATIO  1.23 1.21 1.68 CANCELED   HEMOGLOBIN A1C % 7.60* 8.20*  --  7.70*   MICROALB UR ug/mL  --   --  6.2  --    WBC 10*3/mm3 6.41  --   --  6.70   RBC 10*6/mm3 5.07  --   --  5.03   HEMATOCRIT % 42.9  --   --  44.2   MCV fL 84.6  --   --  87.9   MCH pg 28.8  --   --  29.4   TSH uIU/mL 1.000 4.970* 6.070* 4.690*   FREE T4 ng/dL 1.76* 1.30 1.49  --    PSA ng/mL  --   --   --  1.020     Lab Results   Component Value Date    HGBA1C 7.60 (H) 11/09/2020          Vital Signs:   /76   Pulse 64   Temp 97.6 °F (36.4 °C) (Skin)   Resp 16   Ht 170.2 cm (67\")   Wt 69.9 kg (154 lb)   SpO2 99%   BMI 24.12 kg/m²          Assessment and Plan:      Assessment and Plan {CC Problem List  Visit Diagnosis  ROS  Review (Popup)  Health Maintenance  Quality  BestPractice  Medications  SmartSets  SnapShot Encounters  Media :23}     Problem List Items Addressed This Visit        Endocrine and Metabolic    Type 2 diabetes mellitus with hyperglycemia, without long-term current use of insulin (CMS/[facility]) (Chronic)    Current Assessment & Plan     Diabetes is unchanged.   He is on farxiga as insurance would not cover invokana.    He has a new insurance and will switch back to invokana 300 mg when he has used current supply of farxiga.     Goal A1C less than 7%    Chronic problem with systemic symptom    Lab Results   Component Value Date    HGBA1C 7.60 (H) 11/09/2020               Neuro    PN (peripheral neuropathy) - Primary    Overview     1/19/21: Started gabapentin          Current Assessment & Plan     Start gabapentin: start with one nightly and then can work up to three nightly.       He declines labs as he will be here next in 2 weeks - will also check B-12 at that time.          Relevant " Medications    gabapentin (NEURONTIN) 100 MG capsule        Contract signed for Gabapentin.     Eboni report is reviewed:    I reviewed the document in the electronic form in the Epic EMR.    On review, prescriptions filled under controlled contract with this office are consistent with what is prescribed.   The patient has been using the same pharmacy.   There is not concern for aberrant behavior based on this ekasper review.    Discussed new rx and side effects.     Follow Up {Instructions Charge Capture  Follow-up Communications :23}   Next appointment: 2/15/2021    Patient was given instructions and counseling regarding his condition or for health maintenance advice. Please see specific information pulled into the AVS if appropriate.    Dragon disclaimer:   Much of this encounter note is an electronic transcription/translation of spoken language to printed text. The electronic translation of spoken language may permit erroneous, or at times, nonsensical words or phrases to be inadvertently transcribed; Although I have reviewed the note for such errors, some may still exist.     Additional Patient Counseling:       There are no Patient Instructions on file for this visit.

## 2021-01-25 DIAGNOSIS — F41.9 ANXIETY: ICD-10-CM

## 2021-01-25 RX ORDER — ALPRAZOLAM 0.5 MG/1
0.5 TABLET ORAL 2 TIMES DAILY PRN
Qty: 60 TABLET | Refills: 0 | Status: SHIPPED | OUTPATIENT
Start: 2021-01-25 | End: 2021-03-29 | Stop reason: SDUPTHER

## 2021-01-25 NOTE — TELEPHONE ENCOUNTER
Caller: Jesus Cuba    Relationship: Self    Best call back number: [phone]    Medication needed:   Requested Prescriptions     Pending Prescriptions Disp Refills   • ALPRAZolam (XANAX) 0.5 MG tablet 60 tablet 0     Sig: Take 1 tablet by mouth 2 (Two) Times a Day As Needed. for anxiety       When do you need the refill by: 1/26/21    What details did the patient provide when requesting the medication:  PATIENT STATED THAT HIS NEW INSURANCE DENIED FILLING THIS PRESCRIPTION, HE WOULD LIKE TO KNOW WHAT CAN BE DONE AT THE PRACTICE TO GET THIS FILLED WITH [employer].     PATIENT ALSO HAS A QUESTION ABOUT HIS GABAPENTIN AND THE DOSAGE HE IS SUPPOSED TO TAKE.    PLEASE ADVISE        Does the patient have less than a 3 day supply:  [] Yes  [x] No    What is the patient's preferred pharmacy: [facility] - [place], [place] - [address] - [phone]  - [phone] FX

## 2021-02-04 RX ORDER — PRAVASTATIN SODIUM 80 MG/1
80 TABLET ORAL DAILY
Qty: 90 TABLET | Refills: 0 | Status: SHIPPED | OUTPATIENT
Start: 2021-02-04 | End: 2021-03-29 | Stop reason: SDUPTHER

## 2021-02-15 ENCOUNTER — OFFICE VISIT (OUTPATIENT)
Dept: INTERNAL MEDICINE | Facility: CLINIC | Age: 71
End: 2021-02-15

## 2021-02-15 VITALS
WEIGHT: 166 LBS | HEART RATE: 70 BPM | HEIGHT: 67 IN | DIASTOLIC BLOOD PRESSURE: 80 MMHG | TEMPERATURE: 97.6 F | OXYGEN SATURATION: 98 % | SYSTOLIC BLOOD PRESSURE: 140 MMHG | BODY MASS INDEX: 26.06 KG/M2

## 2021-02-15 DIAGNOSIS — E03.4 HYPOTHYROIDISM DUE TO ACQUIRED ATROPHY OF THYROID: Chronic | ICD-10-CM

## 2021-02-15 DIAGNOSIS — Z12.5 SCREENING FOR PROSTATE CANCER: ICD-10-CM

## 2021-02-15 DIAGNOSIS — Z78.9 VARICELLA VACCINATION STATUS UNKNOWN: ICD-10-CM

## 2021-02-15 DIAGNOSIS — N40.1 BPH ASSOCIATED WITH NOCTURIA: ICD-10-CM

## 2021-02-15 DIAGNOSIS — E11.65 TYPE 2 DIABETES MELLITUS WITH HYPERGLYCEMIA, WITHOUT LONG-TERM CURRENT USE OF INSULIN (HCC): Chronic | ICD-10-CM

## 2021-02-15 DIAGNOSIS — F41.9 ANXIETY: Chronic | ICD-10-CM

## 2021-02-15 DIAGNOSIS — G58.9 MONONEUROPATHY: ICD-10-CM

## 2021-02-15 DIAGNOSIS — I10 ESSENTIAL HYPERTENSION: Primary | Chronic | ICD-10-CM

## 2021-02-15 DIAGNOSIS — I25.10 CORONARY ARTERY DISEASE INVOLVING NATIVE CORONARY ARTERY OF NATIVE HEART WITHOUT ANGINA PECTORIS: Chronic | ICD-10-CM

## 2021-02-15 DIAGNOSIS — R35.1 BPH ASSOCIATED WITH NOCTURIA: ICD-10-CM

## 2021-02-15 PROBLEM — G62.9 PN (PERIPHERAL NEUROPATHY): Chronic | Status: ACTIVE | Noted: 2021-01-19

## 2021-02-15 PROBLEM — E78.5 HYPERLIPIDEMIA LDL GOAL <70: Chronic | Status: ACTIVE | Noted: 2018-07-31

## 2021-02-15 PROCEDURE — 99214 OFFICE O/P EST MOD 30 MIN: CPT | Performed by: NURSE PRACTITIONER

## 2021-02-15 RX ORDER — GABAPENTIN 300 MG/1
300 CAPSULE ORAL 3 TIMES DAILY
Qty: 30 CAPSULE | Refills: 3 | Status: SHIPPED | OUTPATIENT
Start: 2021-02-15 | End: 2021-03-29 | Stop reason: SDUPTHER

## 2021-02-15 NOTE — ASSESSMENT & PLAN NOTE
Lipid abnormalities are improving with treatment.  Pharmacotherapy as ordered.  Lipids will be reassessed in 6 months.  Continue to work on decreasing carbs - elevated triglycerides.    Declines vascepa now due to cost.     Lab Results   Component Value Date    CHLPL 144 11/09/2020    TRIG 240 (H) 11/09/2020    HDL 43 11/09/2020    LDL 62 11/09/2020

## 2021-02-15 NOTE — ASSESSMENT & PLAN NOTE
Diabetes is unchanged.   Continue current treatment regimen.  Diabetes will be reassessed in 6 months.    He states he doesn't think it will be better today because he has not been compliant with diet.  We discussed long term risk of uncontrolled DM.     Lab Results   Component Value Date    HGBA1C 7.60 (H) 11/09/2020

## 2021-02-15 NOTE — ASSESSMENT & PLAN NOTE
Improved on gabapentin.  He is up to taking 3 - 100 mg nightly and effective.     Will change rx to 300 mg nightly.

## 2021-02-15 NOTE — PROGRESS NOTES
"        Chief Complaint  Diabetes (3 mo follow up ), Hypertension, and Hyperlipidemia     Subjective:      History of Present Illness {CC  Problem List  Visit  Diagnosis   Encounters  Notes  Medications  Labs  Result Review Imaging  Media :23}     Jesus Cuba presents to [facility] for follow up of chronic medical conditions.     He has multiple chronic medical conditions including: hypertension, HLD, DMII, CAD (stent to LAD 7/2018), GERD, Anxiety, hypothyroid      LV: 1/19/2021: DM PN  Started gabapentin. States improved.     He states blood pressure has been controlled.   He continues statin -  No complaint of myalgia.    No chest pain or soa.             Objective:      Physical Exam   Result Review  Data Reviewed:{ Labs  Result Review  Imaging  Med Tab  Media :23}          Vital Signs:   /80   Pulse 70   Temp 97.6 °F (36.4 °C)   Ht 170.2 cm (67.01\")   Wt 75.3 kg (166 lb)   SpO2 98%   BMI 25.99 kg/m²         Requested Prescriptions     Signed Prescriptions Disp Refills   • gabapentin (NEURONTIN) 300 MG capsule 30 capsule 3     Sig: Take 1 capsule by mouth 3 (Three) Times a Day.     Routine medications provided by this office will also be refilled via pharmacy request.       Current Outpatient Medications:   •  ALPRAZolam (XANAX) 0.5 MG tablet, Take 1 tablet by mouth 2 (Two) Times a Day As Needed for Anxiety. for anxiety, Disp: 60 tablet, Rfl: 0  •  aspirin 81 MG tablet, Take 81 mg by mouth daily., Disp: , Rfl:   •  clopidogrel (PLAVIX) 75 MG tablet, Take 1 tablet by mouth daily., Disp: 90 tablet, Rfl: 1  •  Dapagliflozin Propanediol (Farxiga) 10 MG tablet, Take 10 mg by mouth Daily., Disp: 90 tablet, Rfl: 1  •  EUTHYROX 75 MCG tablet, Take 1 tablet by mouth once daily, Disp: 90 tablet, Rfl: 0  •  fluticasone (FLONASE) 50 MCG/ACT nasal spray, 2 sprays into the nostril(s) as directed by provider Daily., Disp: 1 bottle, Rfl: 2  •  glucose blood (ONE " TOUCH ULTRA TEST) test strip, USE 1 STRIP TO CHECK GLUCOSE TWICE DAILY, Disp: 100 each, Rfl: 6  •  irbesartan (AVAPRO) 300 MG tablet, Take 1 tablet by mouth Daily., Disp: 90 tablet, Rfl: 1  •  isosorbide mononitrate (IMDUR) 30 MG 24 hr tablet, Take 30 mg by mouth 2 (Two) Times a Day., Disp: , Rfl: 1  •  levothyroxine (Synthroid) 75 MCG tablet, Take 1 tablet by mouth Daily., Disp: 90 tablet, Rfl: 1  •  metFORMIN (GLUCOPHAGE) 1000 MG tablet, Take 1 tablet by mouth 2 (Two) Times a Day With Meals., Disp: 180 tablet, Rfl: 1  •  metoprolol succinate XL (TOPROL-XL) 25 MG 24 hr tablet, Take 12.5 mg by mouth Daily., Disp: , Rfl:   •  NITROSTAT 0.4 MG SL tablet, DISSOLVE ONE TABLET UNDER THE TONGUE AS NEEDED, Disp: 25 tablet, Rfl: 0  •  pravastatin (PRAVACHOL) 80 MG tablet, Take 1 tablet by mouth Daily., Disp: 90 tablet, Rfl: 0  •  sitaGLIPtin (JANUVIA) 100 MG tablet, Take 1 tablet by mouth daily., Disp: 60 tablet, Rfl: 0  •  gabapentin (NEURONTIN) 300 MG capsule, Take 1 capsule by mouth 3 (Three) Times a Day., Disp: 30 capsule, Rfl: 3     Assessment and Plan:      Assessment and Plan {CC Problem List  Visit Diagnosis  ROS  Review (Popup)  Health Maintenance  Quality  BestPractice  Medications  SmartSets  SnapShot Encounters  Media :23}     Problem List Items Addressed This Visit        Cardiac and Vasculature    CAD (coronary artery disease) (Chronic)    Overview     Due to the presence of severe small vessel disease and multiple stents: Cardiology recommended stay on DAPT as long as he tolerates.         Current Assessment & Plan     Coronary artery disease is improving with treatment.  Continue current treatment regimen.  Cardiac status will be reassessed in 6 months.    Continues DAPT and statin.          Essential hypertension - Primary (Chronic)    Current Assessment & Plan     Hypertension is improving with treatment.  Continue current treatment regimen.  Dietary sodium restriction.  Regular aerobic  exercise.  Blood pressure will be reassessed at the next regular appointment.            Endocrine and Metabolic    Hypothyroidism (Chronic)    Type 2 diabetes mellitus with hyperglycemia, without long-term current use of insulin (CMS/[facility]) (Chronic)    Current Assessment & Plan     Diabetes is unchanged.   Continue current treatment regimen.  Diabetes will be reassessed in 6 months.    He states he doesn't think it will be better today because he has not been compliant with diet.  We discussed long term risk of uncontrolled DM.     Lab Results   Component Value Date    HGBA1C 7.60 (H) 11/09/2020            Relevant Orders    Hemoglobin A1c    Microalbumin / Creatinine Urine Ratio - Urine, Clean Catch       Genitourinary and Reproductive     BPH associated with nocturia (Chronic)    Relevant Orders    PSA SCREENING       Mental Health    Anxiety (Chronic)    Current Assessment & Plan     Stable on current rx.             Neuro    PN (peripheral neuropathy) (Chronic)    Overview     1/19/21: Started gabapentin          Current Assessment & Plan     Improved on gabapentin.  He is up to taking 3 - 100 mg nightly and effective.     Will change rx to 300 mg nightly.          Relevant Medications    gabapentin (NEURONTIN) 300 MG capsule    Other Relevant Orders    Hemoglobin A1c    Vitamin B12    Magnesium      Other Visit Diagnoses     Screening for prostate cancer        Relevant Orders    PSA SCREENING    Varicella vaccination status unknown        Relevant Orders    Varicella zoster antibody, IgG          Follow Up {Instructions Charge Capture  Follow-up Communications :23}     Return in about 3 months (around 5/15/2021).  Patient was given instructions and counseling regarding his condition or for health maintenance advice. Please see specific information pulled into the AVS if appropriate.    Maria T disclaimer:   Much of this encounter note is an electronic transcription/translation of spoken language to printed  text. The electronic translation of spoken language may permit erroneous, or at times, nonsensical words or phrases to be inadvertently transcribed; Although I have reviewed the note for such errors, some may still exist.     Additional Patient Counseling:       There are no Patient Instructions on file for this visit.

## 2021-02-16 LAB
ALBUMIN/CREAT UR: 5 MG/G CREAT (ref 0–29)
CREAT UR-MCNC: 87.2 MG/DL
HBA1C MFR BLD: 8.2 % (ref 4.8–5.6)
MAGNESIUM SERPL-MCNC: 1.9 MG/DL (ref 1.6–2.4)
MICROALBUMIN UR-MCNC: 4.2 UG/ML
PSA SERPL-MCNC: 1.21 NG/ML (ref 0–4)
VIT B12 SERPL-MCNC: 344 PG/ML (ref 211–946)
VZV IGG SER IA-ACNC: 636 INDEX

## 2021-02-24 RX ORDER — RANOLAZINE 500 MG/1
500 TABLET, EXTENDED RELEASE ORAL 2 TIMES DAILY
Qty: 180 TABLET | Refills: 3 | Status: SHIPPED | OUTPATIENT
Start: 2021-02-24 | End: 2021-12-27 | Stop reason: SDUPTHER

## 2021-02-24 RX ORDER — RANOLAZINE 500 MG/1
1 TABLET, EXTENDED RELEASE ORAL DAILY
COMMUNITY
Start: 2021-01-06 | End: 2021-02-24 | Stop reason: SDUPTHER

## 2021-03-16 ENCOUNTER — BULK ORDERING (OUTPATIENT)
Dept: CASE MANAGEMENT | Facility: OTHER | Age: 71
End: 2021-03-16

## 2021-03-16 DIAGNOSIS — Z23 IMMUNIZATION DUE: ICD-10-CM

## 2021-03-29 DIAGNOSIS — G58.9 MONONEUROPATHY: ICD-10-CM

## 2021-03-29 DIAGNOSIS — F41.9 ANXIETY: ICD-10-CM

## 2021-03-29 RX ORDER — IRBESARTAN 300 MG/1
300 TABLET ORAL DAILY
Qty: 90 TABLET | Refills: 0 | Status: SHIPPED | OUTPATIENT
Start: 2021-03-29 | End: 2021-06-18

## 2021-03-30 RX ORDER — PRAVASTATIN SODIUM 80 MG/1
80 TABLET ORAL DAILY
Qty: 90 TABLET | Refills: 0 | Status: SHIPPED | OUTPATIENT
Start: 2021-03-30 | End: 2021-04-14

## 2021-03-30 RX ORDER — GABAPENTIN 300 MG/1
300 CAPSULE ORAL 3 TIMES DAILY
Qty: 30 CAPSULE | Refills: 3 | Status: SHIPPED | OUTPATIENT
Start: 2021-03-30 | End: 2021-04-20 | Stop reason: SDUPTHER

## 2021-03-30 RX ORDER — ALPRAZOLAM 0.5 MG/1
0.5 TABLET ORAL 2 TIMES DAILY PRN
Qty: 60 TABLET | Refills: 0 | Status: SHIPPED | OUTPATIENT
Start: 2021-03-30 | End: 2021-06-01 | Stop reason: SDUPTHER

## 2021-04-14 ENCOUNTER — TELEPHONE (OUTPATIENT)
Dept: INTERNAL MEDICINE | Facility: CLINIC | Age: 71
End: 2021-04-14

## 2021-04-14 DIAGNOSIS — E78.5 HYPERLIPIDEMIA LDL GOAL <70: Primary | ICD-10-CM

## 2021-04-14 RX ORDER — PRAVASTATIN SODIUM 80 MG/1
TABLET ORAL
Qty: 90 TABLET | Refills: 0 | Status: SHIPPED | OUTPATIENT
Start: 2021-04-14 | End: 2021-05-14 | Stop reason: SDUPTHER

## 2021-04-14 NOTE — TELEPHONE ENCOUNTER
Caller: Jesus Cuba    Relationship: Self    Best call back number: [phone] (H)    What is the best time to reach you: ANYTIME    Who are you requesting to speak with (clinical staff, provider,  specific staff member): LIZZY HANEY      What was the call regarding: MEDICATION QUESTIONS    Do you require a callback: YES

## 2021-04-16 NOTE — TELEPHONE ENCOUNTER
Patient had questions about his gabapentin being increased from 100 mg TID to 300 mg, he wanted to be sure that [place] wanted him to take 300 mg three times a day as well. He has only been taking 2 a day, because he was concerned. I informed him it does say that in directions but we will address with provider to be positive.  Also asked for samples of Invokana and januvia, which I have put up front

## 2021-04-16 NOTE — TELEPHONE ENCOUNTER
PATIENT IS CALLING BACK TO SEE WHEN HE CAN SPEAK TO SOMEONE REGARDING HIS MEDICATIONS. PLEASE CALL. HE HAS SOME CONCERNS.

## 2021-04-20 DIAGNOSIS — G58.9 MONONEUROPATHY: ICD-10-CM

## 2021-04-20 RX ORDER — GABAPENTIN 300 MG/1
300 CAPSULE ORAL 2 TIMES DAILY PRN
Qty: 60 CAPSULE | Refills: 3 | Status: SHIPPED | OUTPATIENT
Start: 2021-04-20 | End: 2021-05-14 | Stop reason: SDUPTHER

## 2021-05-07 ENCOUNTER — TRANSCRIBE ORDERS (OUTPATIENT)
Dept: ADMINISTRATIVE | Facility: HOSPITAL | Age: 71
End: 2021-05-07

## 2021-05-07 DIAGNOSIS — Z13.6 ENCOUNTER FOR SCREENING FOR VASCULAR DISEASE: Primary | ICD-10-CM

## 2021-05-14 ENCOUNTER — OFFICE VISIT (OUTPATIENT)
Dept: INTERNAL MEDICINE | Facility: CLINIC | Age: 71
End: 2021-05-14

## 2021-05-14 VITALS
OXYGEN SATURATION: 99 % | HEART RATE: 63 BPM | WEIGHT: 169 LBS | HEIGHT: 67 IN | BODY MASS INDEX: 26.53 KG/M2 | SYSTOLIC BLOOD PRESSURE: 124 MMHG | TEMPERATURE: 97.1 F | DIASTOLIC BLOOD PRESSURE: 72 MMHG

## 2021-05-14 DIAGNOSIS — E03.4 HYPOTHYROIDISM DUE TO ACQUIRED ATROPHY OF THYROID: Chronic | ICD-10-CM

## 2021-05-14 DIAGNOSIS — E78.5 HYPERLIPIDEMIA LDL GOAL <70: Chronic | ICD-10-CM

## 2021-05-14 DIAGNOSIS — I25.2 HISTORY OF MYOCARDIAL INFARCTION: Chronic | ICD-10-CM

## 2021-05-14 DIAGNOSIS — I25.10 CORONARY ARTERY DISEASE INVOLVING NATIVE CORONARY ARTERY OF NATIVE HEART WITHOUT ANGINA PECTORIS: Chronic | ICD-10-CM

## 2021-05-14 DIAGNOSIS — G58.9 MONONEUROPATHY: Chronic | ICD-10-CM

## 2021-05-14 DIAGNOSIS — E11.65 TYPE 2 DIABETES MELLITUS WITH HYPERGLYCEMIA, WITHOUT LONG-TERM CURRENT USE OF INSULIN (HCC): Chronic | ICD-10-CM

## 2021-05-14 DIAGNOSIS — I10 ESSENTIAL HYPERTENSION: Chronic | ICD-10-CM

## 2021-05-14 DIAGNOSIS — I10 ESSENTIAL HYPERTENSION: Primary | Chronic | ICD-10-CM

## 2021-05-14 PROCEDURE — 99214 OFFICE O/P EST MOD 30 MIN: CPT | Performed by: NURSE PRACTITIONER

## 2021-05-14 RX ORDER — METOPROLOL SUCCINATE 25 MG/1
12.5 TABLET, EXTENDED RELEASE ORAL DAILY
Qty: 90 TABLET | Refills: 1 | Status: SHIPPED | OUTPATIENT
Start: 2021-05-14 | End: 2021-05-14 | Stop reason: SDUPTHER

## 2021-05-14 RX ORDER — GABAPENTIN 300 MG/1
300 CAPSULE ORAL 2 TIMES DAILY PRN
Qty: 180 CAPSULE | Refills: 1 | Status: SHIPPED | OUTPATIENT
Start: 2021-05-14 | End: 2021-07-16 | Stop reason: SDUPTHER

## 2021-05-14 RX ORDER — METOPROLOL SUCCINATE 25 MG/1
12.5 TABLET, EXTENDED RELEASE ORAL DAILY
Qty: 45 TABLET | Refills: 1 | Status: SHIPPED | OUTPATIENT
Start: 2021-05-14 | End: 2021-07-16 | Stop reason: SDUPTHER

## 2021-05-14 RX ORDER — CANAGLIFLOZIN 300 MG/1
300 TABLET, FILM COATED ORAL DAILY
COMMUNITY
End: 2021-10-11 | Stop reason: SDUPTHER

## 2021-05-14 RX ORDER — PRAVASTATIN SODIUM 80 MG/1
80 TABLET ORAL DAILY
Qty: 90 TABLET | Refills: 1 | Status: SHIPPED | OUTPATIENT
Start: 2021-05-14 | End: 2021-09-02

## 2021-05-14 NOTE — PROGRESS NOTES
Chief Complaint  Hypertension (3 month follow up), Diabetes, Hypothyroidism, and Anxiety     Subjective:      History of Present Illness {CC  Problem List  Visit  Diagnosis   Encounters  Notes  Medications  Labs  Result Review Imaging  Media :23}     Jesus Cuba presents to [facility] for multiple chronic medical conditions including: hypertension, HLD, DMII, CAD (stent to LAD 7/2018), GERD, Anxiety, hypothyroid       LV: 2/25/21, had stated gabapentin 1/19/21 for DM PN, improved.     A1C up to 8.2 from 7.6%   Only taking invokana and not farxiga. No hypoglycemic events.     Blood pressure has been controlled.     He has not had COVID vaccine - gave him opportunity to ask questions. He will think about it.     Walks [employer] 3 miles a day.          Objective:      Physical Exam  Vitals reviewed.   Constitutional:       Appearance: Normal appearance. He is well-developed.   HENT:      Head:      Comments: Wearing mask due to COVID   Neck:      Thyroid: No thyromegaly.   Cardiovascular:      Rate and Rhythm: Normal rate and regular rhythm.      Pulses: Normal pulses.      Heart sounds: Normal heart sounds.   Pulmonary:      Effort: Pulmonary effort is normal.      Breath sounds: Normal breath sounds.      Comments: E/U   Musculoskeletal:         General: Normal range of motion.      Cervical back: Normal range of motion and neck supple.   Lymphadenopathy:      Cervical: No cervical adenopathy.   Skin:     General: Skin is warm and dry.      Capillary Refill: Capillary refill takes 2 to 3 seconds.   Neurological:      Mental Status: He is alert and oriented to person, place, and time.   Psychiatric:         Mood and Affect: Mood normal.         Behavior: Behavior normal. Behavior is cooperative.         Thought Content: Thought content normal.         Judgment: Judgment normal.        Result Review  Data Reviewed:{ Labs  Result Review  Imaging  Med Tab  Media  ":23}   The following data was reviewed by: Rigoberto Lowe III, NP-C on 05/14/2021  Lab Results - Last 18 Months   Lab Units 02/15/21  0858 11/09/20  0836 05/26/20  1008 02/06/20  0932   GLUCOSE mg/dL  --  172* 181* 190*   BUN mg/dL  --  18 19 24*   CREATININE mg/dL  --  1.00 0.97 0.90   EGFR IF NONAFRICN AM mL/min/1.73  --  74 77 84   EGFR IF AFRICN AM mL/min/1.73  --  90 93 102   SODIUM mmol/L  --  140 137 138   POTASSIUM mmol/L  --  4.2 4.7 4.2   CHLORIDE mmol/L  --  106 101 102   CALCIUM mg/dL  --  9.0 9.6 9.3   ALBUMIN g/dL  --  4.30  --   --    BILIRUBIN mg/dL  --  0.4  --   --    ALK PHOS U/L  --  77  --   --    AST (SGOT) U/L  --  11  --   --    ALT (SGPT) U/L  --  21  --   --    CHOLESTEROL mg/dL  --  144 163 166   TRIGLYCERIDES mg/dL  --  240* 374* 320*   HDL CHOL mg/dL  --  43 40 38*   VLDL CHOL mg/dL  --   --  74.8* 64   VLDL CHOLESTEROL RUBY mg/dL  --  39  --   --    LDL CHOL mg/dL  --  62 48 64   LDL/HDL RATIO   --  1.23 1.21 1.68   HEMOGLOBIN A1C % 8.20* 7.60* 8.20*  --    MICROALB UR ug/mL 4.2  --   --  6.2   WBC 10*3/mm3  --  6.41  --   --    RBC 10*6/mm3  --  5.07  --   --    HEMATOCRIT %  --  42.9  --   --    MCV fL  --  84.6  --   --    MCH pg  --  28.8  --   --    TSH uIU/mL  --  1.000 4.970* 6.070*   FREE T4 ng/dL  --  1.76* 1.30 1.49   PSA ng/mL 1.210  --   --   --           Vital Signs:   /72 (BP Location: Left arm, Patient Position: Sitting, Cuff Size: Adult)   Pulse 63   Temp 97.1 °F (36.2 °C)   Ht 170.2 cm (67\")   Wt 76.7 kg (169 lb)   SpO2 99%   BMI 26.47 kg/m²         Requested Prescriptions     Pending Prescriptions Disp Refills   • gabapentin (NEURONTIN) 300 MG capsule 180 capsule 1     Sig: Take 1 capsule by mouth 2 (Two) Times a Day As Needed (neuropathy). Take one nightly.  Can go up to twice a day as needed.   • metoprolol succinate XL (TOPROL-XL) 25 MG 24 hr tablet 90 tablet 1     Sig: Take 0.5 tablets by mouth Daily.   • pravastatin (PRAVACHOL) 80 MG tablet " 90 tablet 1     Sig: Take 1 tablet by mouth Daily.     Routine medications provided by this office will also be refilled via pharmacy request.       Current Outpatient Medications:   •  ALPRAZolam (XANAX) 0.5 MG tablet, Take 1 tablet by mouth 2 (Two) Times a Day As Needed for Anxiety. for anxiety, Disp: 60 tablet, Rfl: 0  •  aspirin 81 MG tablet, Take 81 mg by mouth daily., Disp: , Rfl:   •  Canagliflozin (Invokana) 300 MG tablet tablet, Take 300 mg by mouth Daily., Disp: , Rfl:   •  clopidogrel (PLAVIX) 75 MG tablet, Take 1 tablet by mouth daily., Disp: 90 tablet, Rfl: 1  •  EUTHYROX 75 MCG tablet, Take 1 tablet by mouth once daily, Disp: 90 tablet, Rfl: 0  •  fluticasone (FLONASE) 50 MCG/ACT nasal spray, 2 sprays into the nostril(s) as directed by provider Daily., Disp: 1 bottle, Rfl: 2  •  gabapentin (NEURONTIN) 300 MG capsule, Take 1 capsule by mouth 2 (Two) Times a Day As Needed (neuropathy). Take one nightly.  Can go up to twice a day as needed., Disp: 60 capsule, Rfl: 3  •  glucose blood (ONE TOUCH ULTRA TEST) test strip, USE 1 STRIP TO CHECK GLUCOSE TWICE DAILY, Disp: 100 each, Rfl: 6  •  irbesartan (AVAPRO) 300 MG tablet, Take 1 tablet by mouth Daily., Disp: 90 tablet, Rfl: 0  •  isosorbide mononitrate (IMDUR) 30 MG 24 hr tablet, Take 30 mg by mouth 2 (Two) Times a Day., Disp: , Rfl: 1  •  levothyroxine (Synthroid) 75 MCG tablet, Take 1 tablet by mouth Daily., Disp: 90 tablet, Rfl: 1  •  metFORMIN (GLUCOPHAGE) 1000 MG tablet, Take 1 tablet by mouth 2 (Two) Times a Day With Meals., Disp: 180 tablet, Rfl: 1  •  metoprolol succinate XL (TOPROL-XL) 25 MG 24 hr tablet, Take 12.5 mg by mouth Daily., Disp: , Rfl:   •  NITROSTAT 0.4 MG SL tablet, DISSOLVE ONE TABLET UNDER THE TONGUE AS NEEDED, Disp: 25 tablet, Rfl: 0  •  pravastatin (PRAVACHOL) 80 MG tablet, TAKE 1 TABLET EVERY DAY, Disp: 90 tablet, Rfl: 0  •  ranolazine (RANEXA) 500 MG 12 hr tablet, Take 1 tablet by mouth 2 (Two) Times a Day., Disp: 180 tablet,  Rfl: 3  •  sitaGLIPtin (JANUVIA) 100 MG tablet, Take 1 tablet by mouth daily., Disp: 60 tablet, Rfl: 0     Assessment and Plan:      Assessment and Plan {CC Problem List  Visit Diagnosis  ROS  Review (Popup)  Health Maintenance  Quality  BestPractice  Medications  SmartSets  SnapShot Encounters  Media :23}     Problem List Items Addressed This Visit        Cardiac and Vasculature    CAD (coronary artery disease) (Chronic)    Overview     Due to the presence of severe small vessel disease and multiple stents: Cardiology recommended stay on DAPT as long as he tolerates.         Essential hypertension - Primary (Chronic)    Current Assessment & Plan     Hypertension is improving with treatment.  Continue current treatment regimen.  Dietary sodium restriction.  Weight loss.  Regular aerobic exercise.  Blood pressure will be reassessed at the next regular appointment.         History of myocardial infarction (Chronic)    Hyperlipidemia LDL goal <70 (Chronic)    Current Assessment & Plan     Lipid abnormalities are improving with treatment.  Pharmacotherapy as ordered.  Lipids will be reassessed in 6 months.  Triglycerides elevated - could be better.     Lab Results   Component Value Date    CHLPL 144 11/09/2020    TRIG 240 (H) 11/09/2020    HDL 43 11/09/2020    LDL 62 11/09/2020               Endocrine and Metabolic    Hypothyroidism (Chronic)    Type 2 diabetes mellitus with hyperglycemia, without long-term current use of insulin (CMS/[facility]) (Chronic)    Current Assessment & Plan     Diabetes is unchanged.   Continue current treatment regimen.  Discussed foot care.  Reminded to get yearly retinal exam.  Diabetes will be reassessed in 6 months.    Your last A1C (3 month average) =   Lab Results   Component Value Date    HGBA1C 8.20 (H) 02/15/2021      Your diabetes is Uncontrolled.    Eye Health:   You need a diabetic eye exam yearly.   Please have a copy of the note faxed to my office.   Fax:  [phone]    Foot Health:   You need a diabetic foot exam yearly.   Check your feet routinely for any wounds.   You should always check your shoes for any debris that could cause a wound.            Relevant Medications    Canagliflozin (Invokana) 300 MG tablet tablet       Neuro    PN (peripheral neuropathy) (Chronic)    Overview     1/19/21: Started gabapentin                Follow Up {Instructions Charge Capture  Follow-up Communications :23}     Return in about 6 months (around 11/14/2021) for Medicare Wellness.    Patient was given instructions and counseling regarding his condition or for health maintenance advice. Please see specific information pulled into the AVS if appropriate.    Dragon disclaimer:   Much of this encounter note is an electronic transcription/translation of spoken language to printed text. The electronic translation of spoken language may permit erroneous, or at times, nonsensical words or phrases to be inadvertently transcribed; Although I have reviewed the note for such errors, some may still exist.     Additional Patient Counseling:       Patient Instructions     It's Nearly Summer Time!    Stay hydrated when outside  If your urine starts to get darker, you are not drinking enough     Protect yourself from ticks and mosquitos  Here are the best ingredients to look for:  · DEET (N,N-diethyl-m-toluamide or N,N-diethyl-3-methyl-benzamide)  · Picaridin  · Oil of lemon eucalyptus (p-menthane-3,8-diol or PMD)  Wear light-colored pants so you can spot ticks easier  If you use a permethrin product, ONLY apply to clothing    Practice Safe Sun!    · Use sun screen SPF >30 daily, reapply regularly per directions on package  · See dermatologist for skin check regularly  · Protect your eyes with sunglasses with UV protection    Poison Ivy  If you are going into areas that may have poison ivy, prepare with a product like IvyX or other ivy blocker.  Wash your clothes and pets after being in an  area with ivy when returning home. If you come in contact with poison ivy, try a product like Technu     Other things you should incorporate all year...     Diet:    • Eat vegetables, fruits, whole grain, low-fat dairy, poultry, fish, beans, nontropical vegetable oils, and nuts, but avoid red meat (i.e., Mediterranean-style diet, DASH [Dietary Approaches to Stop Hypertension] diet).  • Limit sugary drinks and sweets.  • Limit saturated and trans fat to 5% to 6% of calories.  • Limit sodium intake to 2,400 mg daily (about one teaspoon table salt [kosher/sea salt have less sodium per teaspoon]).  Weight loss / Calorie Counting Apps:    • Lose It!   • [employer] Pal   • Works great when you try it with a partner/ friend. It takes about 15 minutes a day but studies show that this simple method of monitoring your intake can help you achieve goals as it keeps you accountable.  I often will ask patients to try these apps just to get an idea of how much sodium and how many carbohydrates you are taking in.   Exercise:   • Engage in moderate-to-vigorous aerobic activity for at least 40 minutes (on average) three to four times each week.  Wearables:   • Activity tracker   • Step tracker: getting 7,500 steps daily can cut your cardiac risks by 44%   Bone Health:   • Https://www.nof.org/patients/treatment/nutrition/  • Routine weight bearing exercise      If you have not yet had your COVID vaccine, I highly recommend you schedule to have one ASAP.   You are not only protecting your health but you are protecting a love one that may be more vulnerable than you if they were to contract the virus.

## 2021-05-14 NOTE — ASSESSMENT & PLAN NOTE
Coronary artery disease is improving with treatment.  Continue current treatment regimen.  Cardiac status will be reassessed in 6 months.

## 2021-05-14 NOTE — ASSESSMENT & PLAN NOTE
Lipid abnormalities are improving with treatment.  Pharmacotherapy as ordered.  Lipids will be reassessed in 6 months.  Triglycerides elevated - could be better.     Lab Results   Component Value Date    CHLPL 144 11/09/2020    TRIG 240 (H) 11/09/2020    HDL 43 11/09/2020    LDL 62 11/09/2020

## 2021-05-14 NOTE — PATIENT INSTRUCTIONS
It's Nearly Summer Time!    Stay hydrated when outside  If your urine starts to get darker, you are not drinking enough     Protect yourself from ticks and mosquitos  Here are the best ingredients to look for:  · DEET (N,N-diethyl-m-toluamide or N,N-diethyl-3-methyl-benzamide)  · Picaridin  · Oil of lemon eucalyptus (p-menthane-3,8-diol or PMD)  Wear light-colored pants so you can spot ticks easier  If you use a permethrin product, ONLY apply to clothing    Practice Safe Sun!    · Use sun screen SPF >30 daily, reapply regularly per directions on package  · See dermatologist for skin check regularly  · Protect your eyes with sunglasses with UV protection    Poison Ivy  If you are going into areas that may have poison ivy, prepare with a product like IvyX or other ivy blocker.  Wash your clothes and pets after being in an area with ivy when returning home. If you come in contact with poison ivy, try a product like Technu     Other things you should incorporate all year...     Diet:    • Eat vegetables, fruits, whole grain, low-fat dairy, poultry, fish, beans, nontropical vegetable oils, and nuts, but avoid red meat (i.e., Mediterranean-style diet, DASH [Dietary Approaches to Stop Hypertension] diet).  • Limit sugary drinks and sweets.  • Limit saturated and trans fat to 5% to 6% of calories.  • Limit sodium intake to 2,400 mg daily (about one teaspoon table salt [kosher/sea salt have less sodium per teaspoon]).  Weight loss / Calorie Counting Apps:    • Lose It!   • [employer] Pal   • Works great when you try it with a partner/ friend. It takes about 15 minutes a day but studies show that this simple method of monitoring your intake can help you achieve goals as it keeps you accountable.  I often will ask patients to try these apps just to get an idea of how much sodium and how many carbohydrates you are taking in.   Exercise:   • Engage in moderate-to-vigorous aerobic activity for at least 40 minutes (on average)  three to four times each week.  Wearables:   • Activity tracker   • Step tracker: getting 7,500 steps daily can cut your cardiac risks by 44%   Bone Health:   • Https://www.nof.org/patients/treatment/nutrition/  • Routine weight bearing exercise      If you have not yet had your COVID vaccine, I highly recommend you schedule to have one ASAP.   You are not only protecting your health but you are protecting a love one that may be more vulnerable than you if they were to contract the virus.

## 2021-06-01 DIAGNOSIS — F41.9 ANXIETY: ICD-10-CM

## 2021-06-01 DIAGNOSIS — E11.65 TYPE 2 DIABETES MELLITUS WITH HYPERGLYCEMIA, WITHOUT LONG-TERM CURRENT USE OF INSULIN (HCC): ICD-10-CM

## 2021-06-03 DIAGNOSIS — E11.65 TYPE 2 DIABETES MELLITUS WITH HYPERGLYCEMIA, WITHOUT LONG-TERM CURRENT USE OF INSULIN (HCC): ICD-10-CM

## 2021-06-03 RX ORDER — ALPRAZOLAM 0.5 MG/1
0.5 TABLET ORAL 2 TIMES DAILY PRN
Qty: 60 TABLET | Refills: 0 | Status: SHIPPED | OUTPATIENT
Start: 2021-06-03 | End: 2022-10-19

## 2021-06-10 ENCOUNTER — OFFICE VISIT (OUTPATIENT)
Dept: CARDIOLOGY | Facility: CLINIC | Age: 71
End: 2021-06-10

## 2021-06-10 VITALS
OXYGEN SATURATION: 97 % | BODY MASS INDEX: 26.68 KG/M2 | HEART RATE: 72 BPM | WEIGHT: 170 LBS | SYSTOLIC BLOOD PRESSURE: 138 MMHG | DIASTOLIC BLOOD PRESSURE: 80 MMHG | HEIGHT: 67 IN

## 2021-06-10 DIAGNOSIS — I25.10 CORONARY ARTERY DISEASE INVOLVING NATIVE CORONARY ARTERY OF NATIVE HEART WITHOUT ANGINA PECTORIS: Primary | Chronic | ICD-10-CM

## 2021-06-10 DIAGNOSIS — E11.65 TYPE 2 DIABETES MELLITUS WITH HYPERGLYCEMIA, WITHOUT LONG-TERM CURRENT USE OF INSULIN (HCC): Chronic | ICD-10-CM

## 2021-06-10 DIAGNOSIS — E78.5 HYPERLIPIDEMIA LDL GOAL <70: Chronic | ICD-10-CM

## 2021-06-10 DIAGNOSIS — I25.2 HISTORY OF MYOCARDIAL INFARCTION: Chronic | ICD-10-CM

## 2021-06-10 DIAGNOSIS — I10 ESSENTIAL HYPERTENSION: Chronic | ICD-10-CM

## 2021-06-10 DIAGNOSIS — E03.4 HYPOTHYROIDISM DUE TO ACQUIRED ATROPHY OF THYROID: Chronic | ICD-10-CM

## 2021-06-10 DIAGNOSIS — Z95.5 HISTORY OF CORONARY ARTERY STENT PLACEMENT: ICD-10-CM

## 2021-06-10 PROBLEM — I20.0 UNSTABLE ANGINA: Status: RESOLVED | Noted: 2018-07-03 | Resolved: 2021-06-10

## 2021-06-10 PROCEDURE — 99214 OFFICE O/P EST MOD 30 MIN: CPT | Performed by: INTERNAL MEDICINE

## 2021-06-10 PROCEDURE — 93000 ELECTROCARDIOGRAM COMPLETE: CPT | Performed by: INTERNAL MEDICINE

## 2021-06-10 NOTE — PROGRESS NOTES
Subjective:     Encounter Date:06/10/2021      Patient ID: Jessu Cuba is a 71 y.o. male.    Chief Complaint:  History of Present Illness    This is a 71-year-old with a history of coronary artery disease status post prior stents to the mid LAD, proximal posterior descending artery, and first diagonal branch, hypertension, hyperlipidemia, diabetes mellitus type 2, hypothyroidism, who presents for follow-up.     In 11/2020 he reported a couple episodes during the month prior where he developed left upper chest pain and left shoulder pain similar to his prior anginal symptoms after exerting himself shortly after eating.  His EKG showed improvement in inferior T wave changes.  I recommend we monitor his symptoms at that time.    He returns for routine follow-up.  Continues to have the episodes of left upper chest and shoulder pain when he exerts himself after eating but he feels like they are better overall.  He denies any shortness of breath, orthopnea, near-syncope or syncope, or lower extremity edema.  He has been having some right calf pain started after he played golf recently.  He does feel like this pain is getting better.  Denies any swelling or tenderness to palpation of the right calf.  He has put on some weight.  He has been dealing with anxiety and some depression especially since the pandemic started.  He reports having some increase in palpitations recently.  He has had issues with palpitations in the past that initially resolved but have since returned.     Prior History:  The patient was previously followed by Dr. Curiel and then most recently Dr. Tobar.  His prior cardiac history includes that of stents to his mid LAD, proximal posterior descending branch, and most recently stenting of his first diagonal branch in 7/2018 with a resolute 2.0 x 30 mm stent.  At the time of that cardiac catheterization he was noted to have 30 to 40% stenosis at the proximal edge of his widely patent mid LAD stent,  severe diffuse disease of a small left circumflex artery, mild nonobstructive disease of a moderate caliber ramus branch, mild nonobstructive disease of his right coronary artery, patent stent of the proximal posterior descending branch with 50 to 60% stenosis at the proximal edge of the stent and 40 to 50% stenosis of the distal edge of the stent.  At the time of the cardiac catheterization Dr. Curiel recommended further evaluation of the posterior descending artery disease if he continued to have symptoms.       He was then seen by Dr. Tobar initially in 1/2019 when he presented for a second opinion.  At that time he reported pinpoint chest discomfort in his left chest that got better in the past after prior coronary interventions but at the time of his office visit was still occurring despite his recent stent placement.  He also reported dyspnea on exertion and chest tightness which had improved since his stent placement.  At the time of that office visit Dr. Tobar felt that he had class I angina that should be treated medically.  He was already on isosorbide mononitrate 30 mg a day and additionally he was started on a low-dose of metoprolol tartrate and ranolazine 500 mg twice a day.  He then saw YAMILA Fregoso in follow-up in 8/2019 at which time he was feeling really well.  He denies any further anginal symptoms since the initiation of medical therapy and was able to walk 1-1/2 to 2 miles 3 times a week without any significant issues.     I saw him initially in 2/2020 at which time he was not having any significant symptoms.  He was exercising regular basis.  However his EKG did show new inferior ST changes with T wave inversions.  Due to his lack of symptoms I opted to just monitor him before proceeding with any further work-up.  In follow-up in 5/2020 he was continuing to do well and a repeat EKG had returned to baseline.  I attributed the prior abnormalities to elevated blood pressures at that time.    Review  of Systems   Constitutional: Positive for malaise/fatigue.   HENT: Negative for hearing loss, hoarse voice, nosebleeds and sore throat.    Eyes: Negative for pain.   Cardiovascular: Positive for chest pain. Negative for claudication, cyanosis, dyspnea on exertion, irregular heartbeat, leg swelling, near-syncope, orthopnea, palpitations, paroxysmal nocturnal dyspnea and syncope.   Respiratory: Negative for shortness of breath and snoring.    Endocrine: Negative for cold intolerance, heat intolerance, polydipsia, polyphagia and polyuria.   Skin: Negative for itching and rash.   Musculoskeletal: Positive for muscle cramps. Negative for arthritis, falls, joint pain, joint swelling, muscle weakness and myalgias.   Gastrointestinal: Negative for constipation, diarrhea, dysphagia, heartburn, hematemesis, hematochezia, melena, nausea and vomiting.   Genitourinary: Negative for frequency, hematuria and hesitancy.   Neurological: Negative for excessive daytime sleepiness, dizziness, headaches, light-headedness, numbness and weakness.   Psychiatric/Behavioral: Negative for depression. The patient is not nervous/anxious.          Current Outpatient Medications:   •  ALPRAZolam (XANAX) 0.5 MG tablet, Take 1 tablet by mouth 2 (Two) Times a Day As Needed for Anxiety. for anxiety, Disp: 60 tablet, Rfl: 0  •  aspirin 81 MG tablet, Take 81 mg by mouth daily., Disp: , Rfl:   •  Canagliflozin (Invokana) 300 MG tablet tablet, Take 300 mg by mouth Daily., Disp: , Rfl:   •  clopidogrel (PLAVIX) 75 MG tablet, Take 1 tablet by mouth daily., Disp: 90 tablet, Rfl: 1  •  gabapentin (NEURONTIN) 300 MG capsule, Take 1 capsule by mouth 2 (Two) Times a Day As Needed (neuropathy). Take one nightly.  Can go up to twice a day as needed., Disp: 180 capsule, Rfl: 1  •  glucose blood (ONE TOUCH ULTRA TEST) test strip, USE 1 STRIP TO CHECK GLUCOSE TWICE DAILY, Disp: 100 each, Rfl: 6  •  irbesartan (AVAPRO) 300 MG tablet, Take 1 tablet by mouth Daily.,  Disp: 90 tablet, Rfl: 0  •  isosorbide mononitrate (IMDUR) 30 MG 24 hr tablet, Take 30 mg by mouth Daily., Disp: , Rfl: 1  •  levothyroxine (Synthroid) 75 MCG tablet, Take 1 tablet by mouth Daily., Disp: 90 tablet, Rfl: 1  •  metFORMIN (GLUCOPHAGE) 1000 MG tablet, Take 1 tablet by mouth 2 (Two) Times a Day With Meals., Disp: 180 tablet, Rfl: 1  •  metoprolol succinate XL (TOPROL-XL) 25 MG 24 hr tablet, Take 0.5 tablets by mouth Daily., Disp: 45 tablet, Rfl: 1  •  NITROSTAT 0.4 MG SL tablet, DISSOLVE ONE TABLET UNDER THE TONGUE AS NEEDED, Disp: 25 tablet, Rfl: 0  •  pravastatin (PRAVACHOL) 80 MG tablet, Take 1 tablet by mouth Daily., Disp: 90 tablet, Rfl: 1  •  ranolazine (RANEXA) 500 MG 12 hr tablet, Take 1 tablet by mouth 2 (Two) Times a Day., Disp: 180 tablet, Rfl: 3  •  sitaGLIPtin (JANUVIA) 100 MG tablet, Take 1 tablet by mouth daily., Disp: 60 tablet, Rfl: 0    Past Medical History:   Diagnosis Date   • CAD (coronary artery disease)    • Diabetes mellitus (CMS/HCC)    • Dyslipidemia    • Hyperlipidemia    • Hypertension    • Hypothyroidism    • Myocardial infarction (CMS/HCC)        Past Surgical History:   Procedure Laterality Date   • CARDIAC CATHETERIZATION  07/03/2018   • CARDIAC SURGERY     • CORONARY ANGIOPLASTY WITH STENT PLACEMENT      4 stents  over several years - first in 2002   • SKIN BIOPSY      shoulder left       Family History   Problem Relation Age of Onset   • Cancer Mother         skin   • COPD Mother    • Hyperlipidemia Mother    • Hypertension Mother    • Cancer Father         lung   • Diabetes Father    • Heart disease Father    • Hyperlipidemia Father    • Hypertension Father    • Early death Brother         50   • Alcohol abuse Brother    • Colon cancer Neg Hx    • Colon polyps Neg Hx        Social History     Tobacco Use   • Smoking status: Never Smoker   • Smokeless tobacco: Never Used   Substance Use Topics   • Alcohol use: Yes     Alcohol/week: 7.0 standard drinks     Types: 7 Shots  "of liquor per week   • Drug use: Never         ECG 12 Lead    Date/Time: 6/10/2021 9:37 AM  Performed by: Chinyere Sprague MD  Authorized by: Chinyere Sprague MD   Comparison: compared with previous ECG   Comparison to previous ECG: Inferolateral ST changes are new  Rhythm: sinus rhythm  Conduction: left anterior fascicular block  Other findings: left ventricular hypertrophy with strain               Objective:     Visit Vitals  /80 (BP Location: Left arm, Patient Position: Sitting, Cuff Size: Adult)   Pulse 72   Ht 170.2 cm (67\")   Wt 77.1 kg (170 lb)   SpO2 97%   BMI 26.63 kg/m²         Constitutional:       Appearance: Normal appearance. Well-developed.   HENT:      Head: Normocephalic and atraumatic.   Neck:      Vascular: No carotid bruit or JVD.   Pulmonary:      Effort: Pulmonary effort is normal.      Breath sounds: Normal breath sounds.   Cardiovascular:      Normal rate. Regular rhythm.      No gallop.   Pulses:     Radial: 2+ bilaterally.  Edema:     Peripheral edema absent.   Abdominal:      Palpations: Abdomen is soft.   Skin:     General: Skin is warm and dry.   Neurological:      Mental Status: Alert and oriented to person, place, and time.             Assessment:          Diagnosis Plan   1. Coronary artery disease involving native coronary artery of native heart without angina pectoris     2. History of coronary artery stent placement     3. History of myocardial infarction     4. Essential hypertension     5. Hyperlipidemia LDL goal <70     6. Type 2 diabetes mellitus with hyperglycemia, without long-term current use of insulin (CMS/[facility])     7. Hypothyroidism due to acquired atrophy of thyroid            Plan:         1.  Coronary artery disease.  Appears to have stable anginal symptoms that may be improving.  His EKG does show return of the inferolateral T wave changes that have been seen in the past.  However with the improvement in his symptoms I think we should continue to monitor on his " current medical management.  2.  Hypertension.  Well-controlled on his current regimen medications.  3.  Hyperlipidemia.  On pravastatin for goal LDL of around 70 or below.  4.  Diabetes mellitus type 2    I will plan on seeing the patient back again in 6 months.  I asked the patient to notify me if he has any worsening symptoms prior to that.

## 2021-06-15 ENCOUNTER — TELEPHONE (OUTPATIENT)
Dept: CARDIOLOGY | Facility: CLINIC | Age: 71
End: 2021-06-15

## 2021-06-15 NOTE — TELEPHONE ENCOUNTER
Regarding: Test Results Question  Contact: [phone]  ----- Message from Melva Yepez MA sent at 6/15/2021  8:50 AM EDT -----       ----- Message from Jesus Cuba to Chinyere Sprague MD sent at 6/14/2021  7:44 PM -----   could i have dr. mcdonald call me regarding my last test resaults please, thanks [phone]

## 2021-06-15 NOTE — TELEPHONE ENCOUNTER
I called the patient and went over his EKG findings which showed inferolateral T wave changes which have been present on and off for the last year.  Explained to him since his symptoms were actually doing better when I saw him recently I felt that we could continue to monitor his EKG changes.  I reminded him to notify me if he has any worsening symptoms at which point I would have a low threshold to pursue further work-up.  They verbalized understanding.

## 2021-06-18 DIAGNOSIS — E11.9 DIABETES MELLITUS WITHOUT COMPLICATION (HCC): ICD-10-CM

## 2021-06-18 RX ORDER — LEVOTHYROXINE SODIUM 0.07 MG/1
TABLET ORAL
Qty: 90 TABLET | Refills: 0 | Status: SHIPPED | OUTPATIENT
Start: 2021-06-18 | End: 2021-09-02

## 2021-06-18 RX ORDER — IRBESARTAN 300 MG/1
TABLET ORAL
Qty: 90 TABLET | Refills: 0 | Status: SHIPPED | OUTPATIENT
Start: 2021-06-18 | End: 2021-09-02

## 2021-06-24 ENCOUNTER — OFFICE VISIT (OUTPATIENT)
Dept: INTERNAL MEDICINE | Facility: CLINIC | Age: 71
End: 2021-06-24

## 2021-06-24 VITALS
BODY MASS INDEX: 26.31 KG/M2 | WEIGHT: 168 LBS | HEART RATE: 62 BPM | SYSTOLIC BLOOD PRESSURE: 128 MMHG | DIASTOLIC BLOOD PRESSURE: 79 MMHG | OXYGEN SATURATION: 99 % | TEMPERATURE: 98.2 F

## 2021-06-24 DIAGNOSIS — E11.65 TYPE 2 DIABETES MELLITUS WITH HYPERGLYCEMIA, WITHOUT LONG-TERM CURRENT USE OF INSULIN (HCC): Primary | Chronic | ICD-10-CM

## 2021-06-24 DIAGNOSIS — Z95.5 HISTORY OF CORONARY ARTERY STENT PLACEMENT: ICD-10-CM

## 2021-06-24 DIAGNOSIS — M79.604 LEG PAIN, DIFFUSE, RIGHT: ICD-10-CM

## 2021-06-24 DIAGNOSIS — E78.5 HYPERLIPIDEMIA LDL GOAL <70: ICD-10-CM

## 2021-06-24 LAB
BUN SERPL-MCNC: 16 MG/DL (ref 8–23)
BUN/CREAT SERPL: 16.2 (ref 7–25)
CALCIUM SERPL-MCNC: 9.6 MG/DL (ref 8.6–10.5)
CHLORIDE SERPL-SCNC: 105 MMOL/L (ref 98–107)
CHOLEST SERPL-MCNC: 163 MG/DL (ref 0–200)
CO2 SERPL-SCNC: 24.5 MMOL/L (ref 22–29)
CREAT SERPL-MCNC: 0.99 MG/DL (ref 0.76–1.27)
GLUCOSE SERPL-MCNC: 124 MG/DL (ref 65–99)
HBA1C MFR BLD: 8.7 % (ref 4.8–5.6)
HDLC SERPL-MCNC: 39 MG/DL (ref 40–60)
LDLC SERPL CALC-MCNC: 87 MG/DL (ref 0–100)
LDLC/HDLC SERPL: 2.07 {RATIO}
POTASSIUM SERPL-SCNC: 4.7 MMOL/L (ref 3.5–5.2)
SODIUM SERPL-SCNC: 140 MMOL/L (ref 136–145)
TRIGL SERPL-MCNC: 216 MG/DL (ref 0–150)
VLDLC SERPL CALC-MCNC: 37 MG/DL (ref 5–40)

## 2021-06-24 PROCEDURE — 99214 OFFICE O/P EST MOD 30 MIN: CPT | Performed by: NURSE PRACTITIONER

## 2021-06-24 NOTE — ASSESSMENT & PLAN NOTE
Lipid abnormalities are improving with treatment.  Pharmacotherapy as ordered.  Lipids will be reassessed in 6 months.    Lab Results   Component Value Date    CHLPL 144 11/09/2020    TRIG 240 (H) 11/09/2020    HDL 43 11/09/2020    LDL 62 11/09/2020

## 2021-06-24 NOTE — PROGRESS NOTES
Chief Complaint  Leg Pain     Subjective:      History of Present Illness {CC  Problem List  Visit  Diagnosis   Encounters  Notes  Medications  Labs  Result Review Imaging  Media :23}     Jesus Cuba presents to [facility] for leg pain.    He states he had this about 1 month ago.  Dull ache to the right calf.  Tender to touch.  He states that since resolved.  He denies any redness or swelling.  No injury.      His blood pressures been controlled.  Last appointment we did not check his A1c.  He states he is not great on compliance with diet.  He still drinks regular Cokes daily.  However he does agree to get A1c checked today. Also due for cholesterol check . Fasting today.     We discussed his recent cardiology appointment.  He was concerned because there was left ventricular hypertrophy.  He did not know that this was on his prior EKGs.  We went over prior EKGs and he felt reassured.           Objective:      Physical Exam  Vitals reviewed.   Constitutional:       Appearance: Normal appearance. He is well-developed.   HENT:      Head:      Comments: Wearing mask due to COVID   Neck:      Thyroid: No thyromegaly.   Cardiovascular:      Rate and Rhythm: Normal rate and regular rhythm.      Pulses: Normal pulses.      Heart sounds: Normal heart sounds. No murmur heard.     Pulmonary:      Effort: Pulmonary effort is normal.      Breath sounds: Normal breath sounds.      Comments: E/U   Musculoskeletal:         General: Normal range of motion.      Right lower leg: No swelling, deformity, lacerations, tenderness or bony tenderness. No edema.      Left lower leg: No edema.   Skin:     General: Skin is warm and dry.      Capillary Refill: Capillary refill takes 2 to 3 seconds.   Neurological:      Mental Status: He is alert and oriented to person, place, and time.      Sensory: No sensory deficit.      Motor: No weakness.      Gait: Gait normal.   Psychiatric:          Mood and Affect: Mood normal.         Behavior: Behavior normal. Behavior is cooperative.         Thought Content: Thought content normal.         Judgment: Judgment normal.        Result Review  Data Reviewed:{ Labs  Result Review  Imaging  Med Tab  Media :23}            Vital Signs:   /79 Comment: manual left  Pulse 62   Temp 98.2 °F (36.8 °C)   Wt 76.2 kg (168 lb)   SpO2 99%   BMI 26.31 kg/m²         Requested Prescriptions      No prescriptions requested or ordered in this encounter     Routine medications provided by this office will also be refilled via pharmacy request.       Current Outpatient Medications:   •  ALPRAZolam (XANAX) 0.5 MG tablet, Take 1 tablet by mouth 2 (Two) Times a Day As Needed for Anxiety. for anxiety, Disp: 60 tablet, Rfl: 0  •  aspirin 81 MG tablet, Take 81 mg by mouth daily., Disp: , Rfl:   •  Canagliflozin (Invokana) 300 MG tablet tablet, Take 300 mg by mouth Daily., Disp: , Rfl:   •  clopidogrel (PLAVIX) 75 MG tablet, Take 1 tablet by mouth daily., Disp: 90 tablet, Rfl: 1  •  gabapentin (NEURONTIN) 300 MG capsule, Take 1 capsule by mouth 2 (Two) Times a Day As Needed (neuropathy). Take one nightly.  Can go up to twice a day as needed., Disp: 180 capsule, Rfl: 1  •  glucose blood (ONE TOUCH ULTRA TEST) test strip, USE 1 STRIP TO CHECK GLUCOSE TWICE DAILY, Disp: 100 each, Rfl: 6  •  irbesartan (AVAPRO) 300 MG tablet, TAKE 1 TABLET EVERY DAY, Disp: 90 tablet, Rfl: 0  •  isosorbide mononitrate (IMDUR) 30 MG 24 hr tablet, Take 30 mg by mouth Daily., Disp: , Rfl: 1  •  levothyroxine (SYNTHROID, LEVOTHROID) 75 MCG tablet, TAKE 1 TABLET EVERY DAY, Disp: 90 tablet, Rfl: 0  •  metFORMIN (GLUCOPHAGE) 1000 MG tablet, TAKE 1 TABLET TWICE DAILY WITH MEALS, Disp: 180 tablet, Rfl: 0  •  metoprolol succinate XL (TOPROL-XL) 25 MG 24 hr tablet, Take 0.5 tablets by mouth Daily., Disp: 45 tablet, Rfl: 1  •  NITROSTAT 0.4 MG SL tablet, DISSOLVE ONE TABLET UNDER THE TONGUE AS NEEDED, Disp:  25 tablet, Rfl: 0  •  pravastatin (PRAVACHOL) 80 MG tablet, Take 1 tablet by mouth Daily., Disp: 90 tablet, Rfl: 1  •  ranolazine (RANEXA) 500 MG 12 hr tablet, Take 1 tablet by mouth 2 (Two) Times a Day., Disp: 180 tablet, Rfl: 3  •  sitaGLIPtin (JANUVIA) 100 MG tablet, Take 1 tablet by mouth daily., Disp: 60 tablet, Rfl: 0     Assessment and Plan:      Assessment and Plan {CC Problem List  Visit Diagnosis  ROS  Review (Popup)  [facility] Maintenance  Quality  BestPractice  Medications  SmartSets  SnapShot Encounters  Media :23}     Problem List Items Addressed This Visit        Cardiac and Vasculature    History of coronary artery stent placement    Relevant Orders    Lipid Panel With LDL / HDL Ratio    Hyperlipidemia LDL goal <70 (Chronic)    Current Assessment & Plan     Lipid abnormalities are improving with treatment.  Pharmacotherapy as ordered.  Lipids will be reassessed in 6 months.    Lab Results   Component Value Date    CHLPL 144 11/09/2020    TRIG 240 (H) 11/09/2020    HDL 43 11/09/2020    LDL 62 11/09/2020            Relevant Orders    Lipid Panel With LDL / HDL Ratio       Endocrine and Metabolic    Type 2 diabetes mellitus with hyperglycemia, without long-term current use of insulin (CMS/[facility]) - Primary (Chronic)    Current Assessment & Plan     Diabetes is improving with treatment.   Continue current treatment regimen.  Diabetes will be reassessed in 6 months.    Your last A1C (3 month average) =   Lab Results   Component Value Date    HGBA1C 8.20 (H) 02/15/2021      Your diabetes is Uncontrolled.  Goal is less than 7%.     Samples of januvia and invokana given today.       Eye Health:   You need a diabetic eye exam yearly.   Please have a copy of the note faxed to my office.   Fax: [phone]    Foot Health:   You need a diabetic foot exam yearly.   Check your feet routinely for any wounds.   You should always check your shoes for any debris that could cause a wound.            Relevant  Orders    Hemoglobin A1c    Basic Metabolic Panel      Other Visit Diagnoses     Leg pain, diffuse, right        States improved.  Will continue to monitor. He is having vascular screen next month. May need referral to vascular.           Follow Up {Instructions Charge Capture  Follow-up Communications :23}     Next appointment 11/22/21.       Patient was given instructions and counseling regarding his condition or for health maintenance advice. Please see specific information pulled into the AVS if appropriate.    Dragon disclaimer:   Much of this encounter note is an electronic transcription/translation of spoken language to printed text. The electronic translation of spoken language may permit erroneous, or at times, nonsensical words or phrases to be inadvertently transcribed; Although I have reviewed the note for such errors, some may still exist.     Additional Patient Counseling:       There are no Patient Instructions on file for this visit.

## 2021-06-24 NOTE — ASSESSMENT & PLAN NOTE
Diabetes is improving with treatment.   Continue current treatment regimen.  Diabetes will be reassessed in 6 months.    Your last A1C (3 month average) =   Lab Results   Component Value Date    HGBA1C 8.20 (H) 02/15/2021      Your diabetes is Uncontrolled.  Goal is less than 7%.     Samples of januvia and invokana given today.       Eye Health:   You need a diabetic eye exam yearly.   Please have a copy of the note faxed to my office.   Fax: [phone]    Foot Health:   You need a diabetic foot exam yearly.   Check your feet routinely for any wounds.   You should always check your shoes for any debris that could cause a wound.

## 2021-07-16 DIAGNOSIS — I10 ESSENTIAL HYPERTENSION: Chronic | ICD-10-CM

## 2021-07-16 DIAGNOSIS — G58.9 MONONEUROPATHY: Chronic | ICD-10-CM

## 2021-07-16 RX ORDER — METOPROLOL SUCCINATE 25 MG/1
12.5 TABLET, EXTENDED RELEASE ORAL DAILY
Qty: 45 TABLET | Refills: 1 | Status: SHIPPED | OUTPATIENT
Start: 2021-07-16 | End: 2022-03-02

## 2021-07-16 RX ORDER — GABAPENTIN 300 MG/1
300 CAPSULE ORAL 2 TIMES DAILY PRN
Qty: 180 CAPSULE | Refills: 1 | Status: SHIPPED | OUTPATIENT
Start: 2021-07-16 | End: 2021-07-26 | Stop reason: SDUPTHER

## 2021-07-20 ENCOUNTER — IMMUNIZATION (OUTPATIENT)
Dept: VACCINE CLINIC | Facility: HOSPITAL | Age: 71
End: 2021-07-20

## 2021-07-20 PROCEDURE — 91300 HC SARSCOV02 VAC 30MCG/0.3ML IM: CPT | Performed by: INTERNAL MEDICINE

## 2021-07-20 PROCEDURE — 0001A: CPT | Performed by: INTERNAL MEDICINE

## 2021-07-23 ENCOUNTER — HOSPITAL ENCOUNTER (OUTPATIENT)
Dept: CARDIOLOGY | Facility: HOSPITAL | Age: 71
Discharge: HOME | End: 2021-07-23
Admitting: SURGERY

## 2021-07-23 VITALS
HEIGHT: 66 IN | DIASTOLIC BLOOD PRESSURE: 68 MMHG | HEART RATE: 67 BPM | BODY MASS INDEX: 27 KG/M2 | SYSTOLIC BLOOD PRESSURE: 147 MMHG | WEIGHT: 168 LBS

## 2021-07-23 DIAGNOSIS — Z13.6 ENCOUNTER FOR SCREENING FOR VASCULAR DISEASE: ICD-10-CM

## 2021-07-23 LAB
BH CV ECHO MEAS - DIST AO DIAM: 1.55 CM
BH CV VAS BP LEFT ARM: NORMAL MMHG
BH CV VAS BP RIGHT ARM: NORMAL MMHG
BH CV XLRA MEAS - MID AO DIAM: 1.71 CM
BH CV XLRA MEAS - PAD LEFT ABI DP: 0.7
BH CV XLRA MEAS - PAD LEFT ABI PT: NORMAL
BH CV XLRA MEAS - PAD LEFT ARM: 147 MMHG
BH CV XLRA MEAS - PAD LEFT LEG DP: 103 MMHG
BH CV XLRA MEAS - PAD LEFT LEG PT: NORMAL MMHG
BH CV XLRA MEAS - PAD RIGHT ABI DP: 1.46
BH CV XLRA MEAS - PAD RIGHT ABI PT: NORMAL
BH CV XLRA MEAS - PAD RIGHT ARM: 137 MMHG
BH CV XLRA MEAS - PAD RIGHT LEG DP: 215 MMHG
BH CV XLRA MEAS - PAD RIGHT LEG PT: NORMAL MMHG
BH CV XLRA MEAS - PROX AO DIAM: 1.85 CM
BH CV XLRA MEAS LEFT ICA/CCA RATIO: 1.57
BH CV XLRA MEAS LEFT MID CCA PSV: NORMAL CM/SEC
BH CV XLRA MEAS LEFT MID ICA PSV: NORMAL CM/SEC
BH CV XLRA MEAS LEFT PROX ECA PSV: NORMAL CM/SEC
BH CV XLRA MEAS RIGHT ICA/CCA RATIO: 1.17
BH CV XLRA MEAS RIGHT MID CCA PSV: NORMAL CM/SEC
BH CV XLRA MEAS RIGHT MID ICA PSV: NORMAL CM/SEC
BH CV XLRA MEAS RIGHT PROX ECA PSV: NORMAL CM/SEC

## 2021-07-23 PROCEDURE — 93799 UNLISTED CV SVC/PROCEDURE: CPT

## 2021-07-26 DIAGNOSIS — G58.9 MONONEUROPATHY: Chronic | ICD-10-CM

## 2021-07-26 RX ORDER — GABAPENTIN 300 MG/1
300 CAPSULE ORAL 2 TIMES DAILY PRN
Qty: 180 CAPSULE | Refills: 1 | Status: SHIPPED | OUTPATIENT
Start: 2021-07-26 | End: 2022-01-09

## 2021-07-26 NOTE — TELEPHONE ENCOUNTER
Contract 2/6/20-needs to be updated at next visit  Last OV 6/24/21  Next OV 11/22/21    Please clarify directions before approving or pharmacy will call

## 2021-07-27 DIAGNOSIS — M79.606 PAIN OF LOWER EXTREMITY, UNSPECIFIED LATERALITY: Primary | ICD-10-CM

## 2021-07-27 DIAGNOSIS — R09.89 OTHER SPECIFIED SYMPTOMS AND SIGNS INVOLVING THE CIRCULATORY AND RESPIRATORY SYSTEMS: ICD-10-CM

## 2021-07-27 DIAGNOSIS — R68.89 ABNORMAL ANKLE BRACHIAL INDEX (ABI): ICD-10-CM

## 2021-08-10 ENCOUNTER — IMMUNIZATION (OUTPATIENT)
Dept: VACCINE CLINIC | Facility: HOSPITAL | Age: 71
End: 2021-08-10

## 2021-08-10 PROCEDURE — 0002A: CPT | Performed by: INTERNAL MEDICINE

## 2021-08-10 PROCEDURE — 91300 HC SARSCOV02 VAC 30MCG/0.3ML IM: CPT | Performed by: INTERNAL MEDICINE

## 2021-08-11 ENCOUNTER — HOSPITAL ENCOUNTER (OUTPATIENT)
Dept: CARDIOLOGY | Facility: HOSPITAL | Age: 71
Discharge: HOME | End: 2021-08-11
Admitting: NURSE PRACTITIONER

## 2021-08-11 DIAGNOSIS — M79.606 PAIN OF LOWER EXTREMITY, UNSPECIFIED LATERALITY: ICD-10-CM

## 2021-08-11 DIAGNOSIS — R09.89 OTHER SPECIFIED SYMPTOMS AND SIGNS INVOLVING THE CIRCULATORY AND RESPIRATORY SYSTEMS: ICD-10-CM

## 2021-08-11 LAB
BH CV LOWER ARTERIAL LEFT ABI RATIO: 0.88
BH CV LOWER ARTERIAL LEFT DORSALIS PEDIS SYS MAX: 116 MMHG
BH CV LOWER ARTERIAL LEFT GREAT TOE SYS MAX: 63 MMHG
BH CV LOWER ARTERIAL LEFT LOW THIGH SYS MAX: 176 MMHG
BH CV LOWER ARTERIAL LEFT POPLITEAL SYS MAX: 165 MMHG
BH CV LOWER ARTERIAL LEFT TBI RATIO: 0.48
BH CV LOWER ARTERIAL RIGHT ABI RATIO: 1.47
BH CV LOWER ARTERIAL RIGHT DORSALIS PEDIS SYS MAX: 186 MMHG
BH CV LOWER ARTERIAL RIGHT GREAT TOE SYS MAX: 76 MMHG
BH CV LOWER ARTERIAL RIGHT POST TIBIAL SYS MAX: 194 MMHG
BH CV LOWER ARTERIAL RIGHT TBI RATIO: 0.58
UPPER ARTERIAL LEFT ARM BRACHIAL SYS MAX: 130 MMHG
UPPER ARTERIAL RIGHT ARM BRACHIAL SYS MAX: 132 MMHG

## 2021-08-11 PROCEDURE — 93923 UPR/LXTR ART STDY 3+ LVLS: CPT

## 2021-08-12 DIAGNOSIS — I73.9 PAD (PERIPHERAL ARTERY DISEASE) (HCC): Primary | ICD-10-CM

## 2021-08-13 ENCOUNTER — TELEMEDICINE (OUTPATIENT)
Dept: INTERNAL MEDICINE | Facility: CLINIC | Age: 71
End: 2021-08-13

## 2021-08-13 DIAGNOSIS — J06.9 UPPER RESPIRATORY TRACT INFECTION, UNSPECIFIED TYPE: Primary | ICD-10-CM

## 2021-08-13 PROCEDURE — 99213 OFFICE O/P EST LOW 20 MIN: CPT | Performed by: NURSE PRACTITIONER

## 2021-08-13 RX ORDER — AZITHROMYCIN 250 MG/1
TABLET, FILM COATED ORAL
Qty: 6 TABLET | Refills: 0 | Status: SHIPPED | OUTPATIENT
Start: 2021-08-13 | End: 2021-09-03

## 2021-08-13 NOTE — PROGRESS NOTES
"Chief Complaint  URI (Body sches, cough, left ribcage pain)    You have chosen to receive care through a telehealth visit.  Do you consent to use a video/audio connection for your medical care today? Yes      Subjective          Jesus Cuba presents to [facility]  Patient presents via video for evaluation of respiratory symptoms.  This is a 71-year-old male.  He had his second COVID-19 vaccine 3 days ago, had about 24 hours of fatigue and then felt better.  Yesterday he began to have body aches, hoarseness, cough with mucus production, left lower rib cage discomfort with coughing, \"soreness\" of his skin and limbs.  No fever.  Relieving factors include getting in the hot tub and Tylenol.  No shortness of breath.  No known exposure to COVID-19.  Denies development of any other new issues today.    Objective   Vital Signs:   There were no vitals taken for this visit.    Physical Exam  Neurological:      Mental Status: He is alert and oriented to person, place, and time.        Result Review :   The following data was reviewed by: CHANDU Mccormick on 08/13/2021:  Common labs    Common Labsle 11/9/20 11/9/20 11/9/20 11/9/20 2/15/21 2/15/21 2/15/21 6/24/21 6/24/21 6/24/21    0836 0836 0836 0836 0858 0858 0858 0904 0904 0904   Glucose 172 (A)         124 (A)   BUN 18         16   Creatinine 1.00         0.99   eGFR Non African Am 74         75   eGFR  Am 90         91   Sodium 140         140   Potassium 4.2         4.7   Chloride 106         105   Calcium 9.0         9.6   Total Protein 6.5            Albumin 4.30            Total Bilirubin 0.4            Alkaline Phosphatase 77            AST (SGOT) 11            ALT (SGPT) 21            WBC   6.41          Hemoglobin   14.6          Hematocrit   42.9          Platelets   217          Total Cholesterol  144      163     Triglycerides  240 (A)      216 (A)     HDL Cholesterol  43      39 (A)     LDL Cholesterol   62      87   "   Hemoglobin A1C    7.60 (A) 8.20 (A)    8.70 (A)    Microalbumin, Urine      4.2       PSA       1.210      (A) Abnormal value       Comments are available for some flowsheets but are not being displayed.           Current outpatient and discharge medications have been reconciled for the patient.  Reviewed by: CHANDU Mccormick           Assessment and Plan    Diagnoses and all orders for this visit:    1. Upper respiratory tract infection, unspecified type (Primary)  -     azithromycin (Zithromax Z-Noé) 250 MG tablet; Take 2 tablets by mouth on day 1, then 1 tablet daily on days 2-5  Dispense: 6 tablet; Refill: 0  -     COVID-19,[facility] ROUTINE, NP/OP SWAB IN TRANSPORT MEDIA OR ESWAB 72 HR TAT - Swab, Oropharynx; Future      Due to the recent Covid vaccination and ongoing symptoms we will test for COVID-19.  He is encouraged to quarantine until results are back.  To cover for potential bacterial cause related to his symptoms I will treat with azithromycin, add Mucinex over-the-counter and increase water intake.    We will contact patient with results and further recommendations.  Follow-up as needed need routinely with PCP, CHANDU Estrada.      Follow Up   No follow-ups on file.  Patient was given instructions and counseling regarding his condition or for health maintenance advice. Please see specific information pulled into the AVS if appropriate.     This visit was conducted via video therefore no physical exam was performed.     This visit has been rescheduled as a video visit to comply with patient safety concerns in accordance with CDC recommendations. Total time of discussion was 15 minutes.

## 2021-08-14 LAB
LABCORP SARS-COV-2, NAA 2 DAY TAT: NORMAL
SARS-COV-2 RNA RESP QL NAA+PROBE: DETECTED

## 2021-08-16 ENCOUNTER — TELEPHONE (OUTPATIENT)
Dept: INTERNAL MEDICINE | Facility: CLINIC | Age: 71
End: 2021-08-16

## 2021-08-17 ENCOUNTER — TELEPHONE (OUTPATIENT)
Dept: INTERNAL MEDICINE | Facility: CLINIC | Age: 71
End: 2021-08-17

## 2021-08-17 NOTE — TELEPHONE ENCOUNTER
----- Message from CHANDU Mccormick sent at 8/17/2021  7:37 AM EDT -----  Please call pt- He is positive for COVID 19. He needs to complete the 14 day quarantine starting from the day his symptoms began until 8/26/2021- he told me his symptoms began on 8/12). For any development of shortness of breath or chest discomfort, would proceed to ER.

## 2021-08-17 NOTE — TELEPHONE ENCOUNTER
See results encounter. Pt advised of recent Covid result. Pt advised of protocols and to follow guidelines.

## 2021-08-17 NOTE — PROGRESS NOTES
Please call pt- He is positive for COVID 19. He needs to complete the 14 day quarantine starting from the day his symptoms began until 8/26/2021- he told me his symptoms began on 8/12). For any development of shortness of breath or chest discomfort, would proceed to ER.

## 2021-08-23 ENCOUNTER — TELEPHONE (OUTPATIENT)
Dept: INTERNAL MEDICINE | Facility: CLINIC | Age: 71
End: 2021-08-23

## 2021-08-23 NOTE — TELEPHONE ENCOUNTER
Spoke w/ pt he said the only problem now that he is having is diarrhea.  He had diarrhea 3 days ago and went to [facility] pharmacy and got an anti-diarrhea med.  He had not had a BM since then, but he said he has not eaten much either.  About 3 minutes ago he had diarrhea.  He has 4 Imodium tablets.  He has taken one now.  Is there anything else he should do. Diet wise other than BRAT diet?  Please advise

## 2021-08-23 NOTE — TELEPHONE ENCOUNTER
Caller: Jesus Cuba    Relationship: Self    Best call back number: [phone]    What is the best time to reach you: ANYTIME    Who are you requesting to speak with (clinical staff, provider,  specific staff member): ELTON HANEY OR MA    What was the call regarding: PATIENT UPSET THAT NO ONE HAS FOLLOWED UP WITH HIM SINCE BEING DIAGNOSED. PATIENT STATES IT HAS BEEN 9 DAYS AND HE NEEDS SOMETHING TO SPEED UP RECOVERY     Do you require a callback: YES

## 2021-08-23 NOTE — TELEPHONE ENCOUNTER
Spoke w/ pt he was informed of results. He is upset you have not been available.   He spoke w/ Bonny on 8/13/21 and Camila 8/17/21.  Pt says the cough is the worst part and would like to have an infusion.  He says several of his friends go to [facility] and were give an infusion and they feel much better.  He would like to know how he can have that done.  Please advise

## 2021-08-25 ENCOUNTER — TELEPHONE (OUTPATIENT)
Dept: INTERNAL MEDICINE | Facility: CLINIC | Age: 71
End: 2021-08-25

## 2021-09-02 DIAGNOSIS — E11.9 DIABETES MELLITUS WITHOUT COMPLICATION (HCC): ICD-10-CM

## 2021-09-02 DIAGNOSIS — E03.4 HYPOTHYROIDISM DUE TO ACQUIRED ATROPHY OF THYROID: Primary | ICD-10-CM

## 2021-09-02 DIAGNOSIS — E78.5 HYPERLIPIDEMIA LDL GOAL <70: Chronic | ICD-10-CM

## 2021-09-02 RX ORDER — PRAVASTATIN SODIUM 80 MG/1
TABLET ORAL
Qty: 90 TABLET | Refills: 3 | Status: SHIPPED | OUTPATIENT
Start: 2021-09-02 | End: 2022-06-22

## 2021-09-02 RX ORDER — LEVOTHYROXINE SODIUM 0.07 MG/1
TABLET ORAL
Qty: 90 TABLET | Refills: 0 | Status: SHIPPED | OUTPATIENT
Start: 2021-09-02 | End: 2021-11-08

## 2021-09-02 RX ORDER — IRBESARTAN 300 MG/1
TABLET ORAL
Qty: 90 TABLET | Refills: 3 | Status: SHIPPED | OUTPATIENT
Start: 2021-09-02 | End: 2022-06-22

## 2021-09-03 ENCOUNTER — OFFICE VISIT (OUTPATIENT)
Dept: INTERNAL MEDICINE | Facility: CLINIC | Age: 71
End: 2021-09-03

## 2021-09-03 VITALS
BODY MASS INDEX: 26.73 KG/M2 | SYSTOLIC BLOOD PRESSURE: 128 MMHG | HEIGHT: 65 IN | HEART RATE: 69 BPM | WEIGHT: 160.4 LBS | TEMPERATURE: 96.8 F | OXYGEN SATURATION: 99 % | RESPIRATION RATE: 16 BRPM | DIASTOLIC BLOOD PRESSURE: 78 MMHG

## 2021-09-03 DIAGNOSIS — U07.1 COVID-19: ICD-10-CM

## 2021-09-03 DIAGNOSIS — B37.0 ORAL CANDIDIASIS: Primary | ICD-10-CM

## 2021-09-03 PROCEDURE — 99213 OFFICE O/P EST LOW 20 MIN: CPT | Performed by: NURSE PRACTITIONER

## 2021-09-03 NOTE — PROGRESS NOTES
Chief Complaint  Exposure To Known Illness (follow up ) and Oral Pain (tongue sore )     Subjective:      History of Present Illness {CC  Problem List  Visit  Diagnosis   Encounters  Notes  Medications  Labs  Result Review Imaging  Media :23}     Jesus Cuba presents to [facility] for COVID follow up.     Symptoms started 8/12/21  Positive test: 8/13/2021  He had only had second COVID vaccine 3 days prior.     Symptoms: cough, body aches, fatigue - loss sense of smell/taste (slowly coming back) still has some fatigue.     He called on 8/23 and wanted something to speed up recovery.   He was outside window for monoclonal antibodies.     He states his mouth has been sore last few days.         Objective:      Physical Exam  Vitals reviewed.   Constitutional:       Appearance: Normal appearance. He is well-developed.   HENT:      Head:      Comments: Wearing mask due to COVID      Mouth/Throat:      Mouth: Mucous membranes are moist.      Pharynx: Oropharynx is clear. No oropharyngeal exudate.      Tonsils: No tonsillar exudate or tonsillar abscesses.      Comments: White patches on tongue   Neck:      Thyroid: No thyromegaly.   Cardiovascular:      Rate and Rhythm: Normal rate and regular rhythm.      Pulses: Normal pulses.      Heart sounds: Normal heart sounds.   Pulmonary:      Effort: Pulmonary effort is normal.      Breath sounds: Normal breath sounds. No wheezing or rhonchi.      Comments: E/U   Skin:     Capillary Refill: Capillary refill takes 2 to 3 seconds.   Neurological:      Mental Status: He is alert and oriented to person, place, and time.   Psychiatric:         Mood and Affect: Mood normal.         Behavior: Behavior normal. Behavior is cooperative.         Thought Content: Thought content normal.         Judgment: Judgment normal.        Result Review  Data Reviewed:{ Labs  Result Review  Imaging  Med Tab  Media :23}            Vital Signs:   BP  "128/78 (BP Location: Left arm, Patient Position: Sitting, Cuff Size: Adult)   Pulse 69   Temp 96.8 °F (36 °C) (Temporal)   Resp 16   Ht 165.1 cm (65\")   Wt 72.8 kg (160 lb 6.4 oz)   SpO2 99%   BMI 26.69 kg/m²         Requested Prescriptions     Signed Prescriptions Disp Refills   • nystatin (MYCOSTATIN) 778142 UNIT/ML suspension 200 mL 0     Sig: Swish and swallow 5 mL 4 (Four) Times a Day.     Routine medications provided by this office will also be refilled via pharmacy request.       Current Outpatient Medications:   •  ALPRAZolam (XANAX) 0.5 MG tablet, Take 1 tablet by mouth 2 (Two) Times a Day As Needed for Anxiety. for anxiety, Disp: 60 tablet, Rfl: 0  •  Canagliflozin (Invokana) 300 MG tablet tablet, Take 300 mg by mouth Daily., Disp: , Rfl:   •  clopidogrel (PLAVIX) 75 MG tablet, Take 1 tablet by mouth daily., Disp: 90 tablet, Rfl: 1  •  gabapentin (NEURONTIN) 300 MG capsule, Take 1 capsule by mouth 2 (Two) Times a Day As Needed (neuropathy). Take one nightly.  Can go up to twice a day as needed., Disp: 180 capsule, Rfl: 1  •  glucose blood (ONE TOUCH ULTRA TEST) test strip, USE 1 STRIP TO CHECK GLUCOSE TWICE DAILY, Disp: 100 each, Rfl: 6  •  irbesartan (AVAPRO) 300 MG tablet, TAKE 1 TABLET EVERY DAY, Disp: 90 tablet, Rfl: 3  •  isosorbide mononitrate (IMDUR) 30 MG 24 hr tablet, Take 30 mg by mouth Daily., Disp: , Rfl: 1  •  levothyroxine (SYNTHROID, LEVOTHROID) 75 MCG tablet, TAKE 1 TABLET EVERY DAY, Disp: 90 tablet, Rfl: 0  •  metFORMIN (GLUCOPHAGE) 1000 MG tablet, TAKE 1 TABLET TWICE DAILY WITH MEALS, Disp: 180 tablet, Rfl: 0  •  metoprolol succinate XL (TOPROL-XL) 25 MG 24 hr tablet, Take 0.5 tablets by mouth Daily., Disp: 45 tablet, Rfl: 1  •  NITROSTAT 0.4 MG SL tablet, DISSOLVE ONE TABLET UNDER THE TONGUE AS NEEDED, Disp: 25 tablet, Rfl: 0  •  pravastatin (PRAVACHOL) 80 MG tablet, TAKE 1 TABLET EVERY DAY, Disp: 90 tablet, Rfl: 3  •  ranolazine (RANEXA) 500 MG 12 hr tablet, Take 1 tablet by " mouth 2 (Two) Times a Day., Disp: 180 tablet, Rfl: 3  •  sitaGLIPtin (JANUVIA) 100 MG tablet, Take 1 tablet by mouth daily., Disp: 60 tablet, Rfl: 0  •  nystatin (MYCOSTATIN) 627675 UNIT/ML suspension, Swish and swallow 5 mL 4 (Four) Times a Day., Disp: 200 mL, Rfl: 0     Assessment and Plan:      Assessment and Plan {CC Problem List  Visit Diagnosis  ROS  Review (Popup)  Health Maintenance  Quality  BestPractice  Medications  SmartSets  SnapShot Encounters  Media :23}     Problem List Items Addressed This Visit     None      Visit Diagnoses     Oral candidiasis    -  Primary    Relevant Medications    nystatin (MYCOSTATIN) 779695 UNIT/ML suspension    COVID-19              He is to follow up with vascular from [facility].    States he has not heard from scheduling yet - I have asked check out to investigate.     Follow Up {Instructions Charge Capture  Follow-up Communications :23}     Has next appointment in November already scheduled.     Patient was given instructions and counseling regarding his condition or for health maintenance advice. Please see specific information pulled into the AVS if appropriate.    Dragon disclaimer:   Much of this encounter note is an electronic transcription/translation of spoken language to printed text. The electronic translation of spoken language may permit erroneous, or at times, nonsensical words or phrases to be inadvertently transcribed; Although I have reviewed the note for such errors, some may still exist.     Additional Patient Counseling:       There are no Patient Instructions on file for this visit.

## 2021-10-11 ENCOUNTER — TELEPHONE (OUTPATIENT)
Dept: INTERNAL MEDICINE | Facility: CLINIC | Age: 71
End: 2021-10-11

## 2021-10-11 RX ORDER — CANAGLIFLOZIN 300 MG/1
300 TABLET, FILM COATED ORAL DAILY
Qty: 90 TABLET | Refills: 1 | Status: SHIPPED | OUTPATIENT
Start: 2021-10-11 | End: 2022-05-03

## 2021-10-11 NOTE — TELEPHONE ENCOUNTER
Left voicemail on patient phone about the samples that we put up front for him to come  tomorrow morning.

## 2021-10-11 NOTE — TELEPHONE ENCOUNTER
Pt calling for a Rx for   Canagliflozin (Invokana) 300 MG tablet tablet        Sig - Route: Take 300 mg by mouth Daily. -       Be sent to [facility] - [place], [place] - [address] - [phone]  - [phone] FX    Pt is out of medication and would like samples until his mail order Rx comes in.  Please advise.

## 2021-11-08 DIAGNOSIS — E03.4 HYPOTHYROIDISM DUE TO ACQUIRED ATROPHY OF THYROID: ICD-10-CM

## 2021-11-08 RX ORDER — LEVOTHYROXINE SODIUM 0.07 MG/1
TABLET ORAL
Qty: 90 TABLET | Refills: 0 | Status: SHIPPED | OUTPATIENT
Start: 2021-11-08 | End: 2022-01-21

## 2021-11-14 DIAGNOSIS — E11.9 DIABETES MELLITUS WITHOUT COMPLICATION (HCC): ICD-10-CM

## 2021-11-22 ENCOUNTER — TELEPHONE (OUTPATIENT)
Dept: INTERNAL MEDICINE | Facility: CLINIC | Age: 71
End: 2021-11-22

## 2021-11-22 ENCOUNTER — OFFICE VISIT (OUTPATIENT)
Dept: INTERNAL MEDICINE | Facility: CLINIC | Age: 71
End: 2021-11-22

## 2021-11-22 VITALS
HEIGHT: 65 IN | DIASTOLIC BLOOD PRESSURE: 80 MMHG | OXYGEN SATURATION: 99 % | WEIGHT: 168 LBS | TEMPERATURE: 98.4 F | BODY MASS INDEX: 27.99 KG/M2 | SYSTOLIC BLOOD PRESSURE: 126 MMHG | RESPIRATION RATE: 16 BRPM | HEART RATE: 72 BPM

## 2021-11-22 DIAGNOSIS — I25.10 CORONARY ARTERY DISEASE INVOLVING NATIVE CORONARY ARTERY OF NATIVE HEART WITHOUT ANGINA PECTORIS: Chronic | ICD-10-CM

## 2021-11-22 DIAGNOSIS — I10 ESSENTIAL HYPERTENSION: Chronic | ICD-10-CM

## 2021-11-22 DIAGNOSIS — G58.9 MONONEUROPATHY: Chronic | ICD-10-CM

## 2021-11-22 DIAGNOSIS — R35.1 BPH ASSOCIATED WITH NOCTURIA: Chronic | ICD-10-CM

## 2021-11-22 DIAGNOSIS — N40.1 BPH ASSOCIATED WITH NOCTURIA: Chronic | ICD-10-CM

## 2021-11-22 DIAGNOSIS — E78.5 HYPERLIPIDEMIA LDL GOAL <70: Chronic | ICD-10-CM

## 2021-11-22 DIAGNOSIS — E03.4 HYPOTHYROIDISM DUE TO ACQUIRED ATROPHY OF THYROID: Chronic | ICD-10-CM

## 2021-11-22 DIAGNOSIS — E11.65 TYPE 2 DIABETES MELLITUS WITH HYPERGLYCEMIA, WITHOUT LONG-TERM CURRENT USE OF INSULIN (HCC): Chronic | ICD-10-CM

## 2021-11-22 DIAGNOSIS — Z00.00 MEDICARE ANNUAL WELLNESS VISIT, SUBSEQUENT: Primary | ICD-10-CM

## 2021-11-22 PROBLEM — I73.9 PVD (PERIPHERAL VASCULAR DISEASE) (HCC): Status: ACTIVE | Noted: 2021-11-22

## 2021-11-22 PROCEDURE — 1170F FXNL STATUS ASSESSED: CPT | Performed by: NURSE PRACTITIONER

## 2021-11-22 PROCEDURE — 1126F AMNT PAIN NOTED NONE PRSNT: CPT | Performed by: NURSE PRACTITIONER

## 2021-11-22 PROCEDURE — G0439 PPPS, SUBSEQ VISIT: HCPCS | Performed by: NURSE PRACTITIONER

## 2021-11-22 PROCEDURE — 96160 PT-FOCUSED HLTH RISK ASSMT: CPT | Performed by: NURSE PRACTITIONER

## 2021-11-22 PROCEDURE — 99214 OFFICE O/P EST MOD 30 MIN: CPT | Performed by: NURSE PRACTITIONER

## 2021-11-22 PROCEDURE — 1159F MED LIST DOCD IN RCRD: CPT | Performed by: NURSE PRACTITIONER

## 2021-11-22 RX ORDER — ASPIRIN 81 MG/1
81 TABLET ORAL DAILY
Start: 2021-11-22 | End: 2022-08-16

## 2021-11-22 NOTE — PROGRESS NOTES
The ABCs of the Annual Wellness Visit  Subsequent Medicare Wellness Visit    Chief Complaint   Patient presents with   • Medicare Wellness-subsequent      Subjective    History of Present Illness:  Jesus Cuba is a 71 y.o. male who presents for a Subsequent Medicare Wellness Visit.    He has multiple chronic medical conditions including: hypertension, HLD, DMII w PN, CAD (stent to LAD 7/2018), GERD, Anxiety, hypothyroid      Since last visit, he was seen by vascular, Dr Escobedo.   Mild to moderate PVD in left lower extremity. Asymptomatic and will follow up next year with repeat KAROLINA.     States down to walking only once a week.       The following portions of the patient's history were reviewed and   updated as appropriate: allergies, current medications, past family history, past medical history, past social history, past surgical history and problem list.    Compared to one year ago, the patient feels his physical   health is the same.    Compared to one year ago, the patient feels his mental   health is the same.    Recent Hospitalizations:  He was not admitted to the hospital during the last year.       Current Medical Providers:  Patient Care Team:  Rigoberto Lowe III, NP-C as PCP - General (Family Medicine)  Chinyere Sprague MD as Consulting Physician (Cardiology)  Shubham Escobedo MD as Surgeon (Vascular Surgery)    Outpatient Medications Prior to Visit   Medication Sig Dispense Refill   • ALPRAZolam (XANAX) 0.5 MG tablet Take 1 tablet by mouth 2 (Two) Times a Day As Needed for Anxiety. for anxiety 60 tablet 0   • Canagliflozin (Invokana) 300 MG tablet tablet Take 1 tablet by mouth Daily. 90 tablet 1   • clopidogrel (PLAVIX) 75 MG tablet Take 1 tablet by mouth daily. 90 tablet 1   • gabapentin (NEURONTIN) 300 MG capsule Take 1 capsule by mouth 2 (Two) Times a Day As Needed (neuropathy). Take one nightly.  Can go up to twice a day as needed. 180 capsule 1   • glucose blood (ONE TOUCH ULTRA  TEST) test strip USE 1 STRIP TO CHECK GLUCOSE TWICE DAILY 100 each 6   • irbesartan (AVAPRO) 300 MG tablet TAKE 1 TABLET EVERY DAY 90 tablet 3   • isosorbide mononitrate (IMDUR) 30 MG 24 hr tablet Take 30 mg by mouth Daily.  1   • levothyroxine (SYNTHROID, LEVOTHROID) 75 MCG tablet TAKE 1 TABLET EVERY DAY 90 tablet 0   • metFORMIN (GLUCOPHAGE) 1000 MG tablet TAKE 1 TABLET TWICE DAILY WITH MEALS 180 tablet 0   • metoprolol succinate XL (TOPROL-XL) 25 MG 24 hr tablet Take 0.5 tablets by mouth Daily. 45 tablet 1   • NITROSTAT 0.4 MG SL tablet DISSOLVE ONE TABLET UNDER THE TONGUE AS NEEDED 25 tablet 0   • nystatin (MYCOSTATIN) 021501 UNIT/ML suspension Swish and swallow 5 mL 4 (Four) Times a Day. 200 mL 0   • pravastatin (PRAVACHOL) 80 MG tablet TAKE 1 TABLET EVERY DAY 90 tablet 3   • ranolazine (RANEXA) 500 MG 12 hr tablet Take 1 tablet by mouth 2 (Two) Times a Day. 180 tablet 3   • sitaGLIPtin (JANUVIA) 100 MG tablet Take 1 tablet by mouth daily. 60 tablet 0     No facility-administered medications prior to visit.       No opioid medication identified on active medication list. I have reviewed chart for other potential  high risk medication/s and harmful drug interactions in the elderly.          Aspirin is not on active medication list.  ASA - taking appropriately.       Patient Active Problem List   Diagnosis   • Essential hypertension   • Hypothyroidism   • Anxiety   • BPH associated with nocturia   • History of ASCVD   • Nicotine dependence in remission   • Vasculogenic erectile dysfunction   • Umbilical hernia   • Type 2 diabetes mellitus with hyperglycemia, without long-term current use of insulin (HCC)   • Hyperlipidemia LDL goal <70   • Prostate cancer screening   • Encounter for long-term (current) drug use   • Muscle strain   • CAD (coronary artery disease)   • History of coronary artery stent placement   • History of myocardial infarction   • PN (peripheral neuropathy)   • Medication management contract  "signed   • PVD (peripheral vascular disease) ([facility])     Advance Care Planning  Advance Directive is not on file.  ACP discussion was held with the patient during this visit. Patient does not have an advance directive, information provided.          Objective    Vitals:    11/22/21 0757   BP: 126/80   BP Location: Left arm   Patient Position: Sitting   Cuff Size: Adult   Pulse: 72   Resp: 16   Temp: 98.4 °F (36.9 °C)   TempSrc: Temporal   SpO2: 99%   Weight: 76.2 kg (168 lb)   Height: 165.1 cm (65\")   PainSc: 0-No pain     BMI Readings from Last 1 Encounters:   11/22/21 27.96 kg/m²   BMI is above normal parameters. Recommendations include: exercise counseling    Does the patient have evidence of cognitive impairment? No    Physical Exam  Vitals reviewed.   Constitutional:       Appearance: He is well-developed.   HENT:      Head: Normocephalic and atraumatic.      Right Ear: External ear normal.      Left Ear: External ear normal.      Nose: Nose normal.   Eyes:      Conjunctiva/sclera: Conjunctivae normal.      Pupils: Pupils are equal, round, and reactive to light.   Neck:      Thyroid: No thyromegaly.      Vascular: No JVD.   Cardiovascular:      Rate and Rhythm: Normal rate and regular rhythm.      Pulses: Normal pulses.           Radial pulses are 2+ on the right side and 2+ on the left side.      Heart sounds: Normal heart sounds, S1 normal and S2 normal. No murmur heard.  No friction rub. No gallop.    Pulmonary:      Effort: Pulmonary effort is normal.      Breath sounds: Normal breath sounds.   Chest:      Chest wall: No deformity.   Abdominal:      General: Bowel sounds are normal.      Palpations: Abdomen is soft.      Tenderness: There is no abdominal tenderness. Negative signs include Carballo's sign.      Hernia: No hernia is present.   Musculoskeletal:         General: Normal range of motion.      Cervical back: Normal range of motion and neck supple.      Right lower leg: No edema.      Left lower leg: " No edema.   Lymphadenopathy:      Cervical: No cervical adenopathy.   Skin:     General: Skin is warm and dry.      Capillary Refill: Capillary refill takes 2 to 3 seconds.      Nails: There is no clubbing.   Neurological:      General: No focal deficit present.      Mental Status: He is alert and oriented to person, place, and time.      Cranial Nerves: No cranial nerve deficit.      Sensory: No sensory deficit.      Motor: Motor function is intact.   Psychiatric:         Mood and Affect: Mood normal.         Speech: Speech normal.         Behavior: Behavior normal. Behavior is cooperative.         Thought Content: Thought content normal.         Judgment: Judgment normal.                 HEALTH RISK ASSESSMENT    Smoking Status:  Social History     Tobacco Use   Smoking Status Never Smoker   Smokeless Tobacco Never Used     Alcohol Consumption:  Social History     Substance and Sexual Activity   Alcohol Use Yes   • Alcohol/week: 7.0 standard drinks   • Types: 7 Shots of liquor per week     Fall Risk Screen:    KALA Fall Risk Assessment was completed, and patient is at LOW risk for falls.Assessment completed on:11/22/2021    Depression Screening:  PHQ-2/PHQ-9 Depression Screening 11/22/2021   Little interest or pleasure in doing things 0   Feeling down, depressed, or hopeless 0   Total Score 0       Health Habits and Functional and Cognitive Screening:  Functional & Cognitive Status 11/22/2021   Do you have difficulty preparing food and eating? No   Do you have difficulty bathing yourself, getting dressed or grooming yourself? No   Do you have difficulty using the toilet? No   Do you have difficulty moving around from place to place? No   Do you have trouble with steps or getting out of a bed or a chair? No   Current Diet Unhealthy Diet   Dental Exam Up to date   Eye Exam Up to date   Exercise (times per week) 3 times per week   Current Exercises Include Walking   Current Exercise Activities Include -   Do you  need help using the phone?  No   Are you deaf or do you have serious difficulty hearing?  No   Do you need help with transportation? No   Do you need help shopping? No   Do you need help preparing meals?  No   Do you need help with housework?  No   Do you need help with laundry? No   Do you need help taking your medications? No   Do you need help managing money? No   Do you ever drive or ride in a car without wearing a seat belt? No   Have you felt unusual stress, anger or loneliness in the last month? No   Who do you live with? Alone   If you need help, do you have trouble finding someone available to you? No   Have you been bothered in the last four weeks by sexual problems? No   Do you have difficulty concentrating, remembering or making decisions? No       Age-appropriate Screening Schedule:  Refer to the list below for future screening recommendations based on patient's age, sex and/or medical conditions. Orders for these recommended tests are listed in the plan section. The patient has been provided with a written plan.    Health Maintenance   Topic Date Due   • TDAP/TD VACCINES (1 - Tdap) Never done   • ZOSTER VACCINE (1 of 2) Never done   • DIABETIC EYE EXAM  07/17/2021   • INFLUENZA VACCINE  08/01/2021   • HEMOGLOBIN A1C  12/24/2021   • DIABETIC FOOT EXAM  01/19/2022   • URINE MICROALBUMIN  02/15/2022   • LIPID PANEL  06/24/2022              Assessment/Plan   CMS Preventative Services Quick Reference  Risk Factors Identified During Encounter  Cardiovascular Disease  Immunizations Discussed/Encouraged (specific Immunizations; Influenza and Pneumococcal 23     Declined vaccines.   States he will not get COVID booster.     The above risks/problems have been discussed with the patient.  Follow up actions/plans if indicated are seen below in the Assessment/Plan Section.  Pertinent information has been shared with the patient in the After Visit Summary.    Problem List Items Addressed This Visit        Cardiac  and Vasculature    CAD (coronary artery disease) (Chronic)    Overview     Due to the presence of severe small vessel disease and multiple stents: Cardiology recommended stay on DAPT as long as he tolerates.         Current Assessment & Plan     Coronary artery disease is improving with treatment.  Continue current treatment regimen.  Cardiac status will be reassessed in 6 months.         Relevant Medications    aspirin (aspirin) 81 MG EC tablet    Other Relevant Orders    Lipid Panel With LDL / HDL Ratio    Essential hypertension (Chronic)    Current Assessment & Plan     Hypertension is improving with treatment.  Continue current treatment regimen.  Dietary sodium restriction.  Weight loss.  Regular aerobic exercise.  Continue current medications.  Blood pressure will be reassessed at the next regular appointment.         Relevant Orders    Comprehensive Metabolic Panel    CBC (No Diff)    Hyperlipidemia LDL goal <70 (Chronic)    Current Assessment & Plan     Lipid abnormalities are improving with treatment.  Pharmacotherapy as ordered.  Lipids will be reassessed in 6 months.    Lab Results   Component Value Date    CHLPL 163 06/24/2021    TRIG 216 (H) 06/24/2021    HDL 39 (L) 06/24/2021    LDL 87 06/24/2021         Your triglycerides are elevated. You should cut back on sugar, carbohydrates (including white breads or items made with white flour), and limit alcohol if your currently consume. Increase your exercise level.            Relevant Orders    Comprehensive Metabolic Panel    Lipid Panel With LDL / HDL Ratio       Endocrine and Metabolic    Hypothyroidism (Chronic)    Current Assessment & Plan     Stable   Check lab for ongoing therapeutic drug monitoring            Relevant Orders    TSH    T4, free    Type 2 diabetes mellitus with hyperglycemia, without long-term current use of insulin (HCC) (Chronic)    Current Assessment & Plan     Diabetes is improving with treatment.   Continue current treatment  regimen.  Discussed foot care.  Reminded to get yearly retinal exam.  Diabetes will be reassessed in 6 months.    Your last A1C (3 month average) =   Lab Results   Component Value Date    HGBA1C 8.70 (H) 06/24/2021      Your diabetes is Uncontrolled.  Encouraged medication compliance (cost has been an issue - samples given today)  Increase frequency of exercise.     Eye Health:   You need a diabetic eye exam yearly.   Please have a copy of the note faxed to my office.   Fax: [phone]    Foot Health:   You need a diabetic foot exam yearly.   Check your feet routinely for any wounds.   You should always check your shoes for any debris that could cause a wound.            Relevant Orders    Hemoglobin A1c       Genitourinary and Reproductive     BPH associated with nocturia (Chronic)       Neuro    PN (peripheral neuropathy) (Chronic)    Overview     1/19/21: Started gabapentin          Current Assessment & Plan     States much improved on gabapentin.  Continue.            Other Visit Diagnoses     Medicare annual wellness visit, subsequent    -  Primary        E-steven report is reviewed:    I reviewed the document in the electronic form in the Epic EMR.    On review, prescriptions filled under controlled contract with this office are consistent with what is prescribed.   The patient has been using the same pharmacy.   There is not concern for aberrant behavior based on this ekasper review.    Follow Up:   Return in about 6 months (around 5/22/2022).     An After Visit Summary and PPPS were made available to the patient.

## 2021-11-22 NOTE — ASSESSMENT & PLAN NOTE
Lipid abnormalities are improving with treatment.  Pharmacotherapy as ordered.  Lipids will be reassessed in 6 months.    Lab Results   Component Value Date    CHLPL 163 06/24/2021    TRIG 216 (H) 06/24/2021    HDL 39 (L) 06/24/2021    LDL 87 06/24/2021         Your triglycerides are elevated. You should cut back on sugar, carbohydrates (including white breads or items made with white flour), and limit alcohol if your currently consume. Increase your exercise level.

## 2021-11-22 NOTE — PATIENT INSTRUCTIONS
Medicare Wellness  Personal Prevention Plan of Service     Date of Office Visit:  2021  Encounter Provider:  Rigoberto Lowe III, NP-C  Place of Service:  [facility]  Patient Name: Jesus Cuba  :  1950    As part of the Medicare Wellness portion of your visit today, we are providing you with this personalized preventive plan of services (PPPS). This plan is based upon recommendations of the [place] Preventive Services Task Force (USPSTF) and the Advisory Committee on Immunization Practices (ACIP).    This lists the preventive care services that should be considered, and provides dates of when you are due. Items listed as completed are up-to-date and do not require any further intervention.    Health Maintenance   Topic Date Due   • TDAP/TD VACCINES (1 - Tdap) Never done   • ZOSTER VACCINE (1 of 2) Never done   • DIABETIC EYE EXAM  2021   • INFLUENZA VACCINE  2021   • Pneumococcal Vaccine 65+ (1 of 2 - PPSV23) 2022 (Originally 1956)   • HEMOGLOBIN A1C  2021   • DIABETIC FOOT EXAM  2022   • COVID-19 Vaccine (3 - Booster for Pfizer series) 02/10/2022   • URINE MICROALBUMIN  02/15/2022   • LIPID PANEL  2022   • ANNUAL WELLNESS VISIT  2022   • COLORECTAL CANCER SCREENING  08/15/2026   • HEPATITIS C SCREENING  Completed       Orders Placed This Encounter   Procedures   • Comprehensive Metabolic Panel     Order Specific Question:   Release to patient     Answer:   Immediate   • Lipid Panel With LDL / HDL Ratio     Order Specific Question:   Release to patient     Answer:   Immediate   • CBC (No Diff)     Order Specific Question:   Release to patient     Answer:   Immediate   • Hemoglobin A1c     Order Specific Question:   Release to patient     Answer:   Immediate   • TSH     Order Specific Question:   Release to patient     Answer:   Immediate   • T4, free     Order Specific Question:   Release to patient      Answer:   Immediate       Return in about 6 months (around 5/22/2022).

## 2021-11-22 NOTE — ASSESSMENT & PLAN NOTE
Diabetes is improving with treatment.   Continue current treatment regimen.  Discussed foot care.  Reminded to get yearly retinal exam.  Diabetes will be reassessed in 6 months.    Your last A1C (3 month average) =   Lab Results   Component Value Date    HGBA1C 8.70 (H) 06/24/2021      Your diabetes is Uncontrolled.  Encouraged medication compliance (cost has been an issue - samples given today)  Increase frequency of exercise.     Eye Health:   You need a diabetic eye exam yearly.   Please have a copy of the note faxed to my office.   Fax: [phone]    Foot Health:   You need a diabetic foot exam yearly.   Check your feet routinely for any wounds.   You should always check your shoes for any debris that could cause a wound.

## 2021-11-22 NOTE — TELEPHONE ENCOUNTER
Left voicemail on [employer] Medical Records and gave patient name and fax number to them to fax over

## 2021-11-22 NOTE — TELEPHONE ENCOUNTER
Hub staff attempted to follow warm transfer process and was unsuccessful     Caller:   Jesus Cuba (Self) [phone] (H)         Relationship to patient:     Best call back number:    Patient is needing:     PATIENT CALLED TO LET YOU KNOW THAT HIS OPTOMETRIST IS :     [facility]   [phone]

## 2021-11-23 LAB
ALBUMIN SERPL-MCNC: 4.3 G/DL (ref 3.8–4.8)
ALBUMIN/GLOB SERPL: 1.6 {RATIO} (ref 1.2–2.2)
ALP SERPL-CCNC: 90 IU/L (ref 44–121)
ALT SERPL-CCNC: 23 IU/L (ref 0–44)
AST SERPL-CCNC: 11 IU/L (ref 0–40)
BILIRUB SERPL-MCNC: 0.3 MG/DL (ref 0–1.2)
BUN SERPL-MCNC: 18 MG/DL (ref 8–27)
BUN/CREAT SERPL: 16 (ref 10–24)
CALCIUM SERPL-MCNC: 9.5 MG/DL (ref 8.6–10.2)
CHLORIDE SERPL-SCNC: 104 MMOL/L (ref 96–106)
CHOLEST SERPL-MCNC: 178 MG/DL (ref 100–199)
CO2 SERPL-SCNC: 21 MMOL/L (ref 20–29)
CREAT SERPL-MCNC: 1.1 MG/DL (ref 0.76–1.27)
ERYTHROCYTE [DISTWIDTH] IN BLOOD BY AUTOMATED COUNT: 14 % (ref 11.6–15.4)
GLOBULIN SER CALC-MCNC: 2.7 G/DL (ref 1.5–4.5)
GLUCOSE SERPL-MCNC: 212 MG/DL (ref 65–99)
HBA1C MFR BLD: 8.8 % (ref 4.8–5.6)
HCT VFR BLD AUTO: 43.8 % (ref 37.5–51)
HDLC SERPL-MCNC: 37 MG/DL
HGB BLD-MCNC: 14.7 G/DL (ref 13–17.7)
LDLC SERPL CALC-MCNC: 80 MG/DL (ref 0–99)
LDLC/HDLC SERPL: 2.2 RATIO (ref 0–3.6)
MCH RBC QN AUTO: 28.7 PG (ref 26.6–33)
MCHC RBC AUTO-ENTMCNC: 33.6 G/DL (ref 31.5–35.7)
MCV RBC AUTO: 86 FL (ref 79–97)
PLATELET # BLD AUTO: 238 X10E3/UL (ref 150–450)
POTASSIUM SERPL-SCNC: 4.9 MMOL/L (ref 3.5–5.2)
PROT SERPL-MCNC: 7 G/DL (ref 6–8.5)
RBC # BLD AUTO: 5.12 X10E6/UL (ref 4.14–5.8)
SODIUM SERPL-SCNC: 140 MMOL/L (ref 134–144)
T4 FREE SERPL-MCNC: 1.5 NG/DL (ref 0.82–1.77)
TRIGL SERPL-MCNC: 378 MG/DL (ref 0–149)
TSH SERPL DL<=0.005 MIU/L-ACNC: 1.47 UIU/ML (ref 0.45–4.5)
VLDLC SERPL CALC-MCNC: 61 MG/DL (ref 5–40)
WBC # BLD AUTO: 7.2 X10E3/UL (ref 3.4–10.8)

## 2021-12-27 ENCOUNTER — PATIENT MESSAGE (OUTPATIENT)
Dept: INTERNAL MEDICINE | Facility: CLINIC | Age: 71
End: 2021-12-27

## 2021-12-27 RX ORDER — RANOLAZINE 500 MG/1
500 TABLET, EXTENDED RELEASE ORAL 2 TIMES DAILY
Qty: 180 TABLET | Refills: 3 | Status: SHIPPED | OUTPATIENT
Start: 2021-12-27 | End: 2022-03-14

## 2021-12-27 NOTE — TELEPHONE ENCOUNTER
From: Jesus Cuba  To: Rigoberto Lowe III, NP-C  Sent: 12/27/2021 11:19 AM EST  Subject: ranolazine 500mg    i [facility]ill [facility]

## 2021-12-28 ENCOUNTER — OFFICE VISIT (OUTPATIENT)
Dept: CARDIOLOGY | Facility: CLINIC | Age: 71
End: 2021-12-28

## 2021-12-28 VITALS
DIASTOLIC BLOOD PRESSURE: 80 MMHG | HEIGHT: 67 IN | HEART RATE: 63 BPM | BODY MASS INDEX: 26.37 KG/M2 | WEIGHT: 168 LBS | SYSTOLIC BLOOD PRESSURE: 142 MMHG

## 2021-12-28 DIAGNOSIS — I25.10 CORONARY ARTERY DISEASE INVOLVING NATIVE CORONARY ARTERY OF NATIVE HEART WITHOUT ANGINA PECTORIS: Primary | Chronic | ICD-10-CM

## 2021-12-28 DIAGNOSIS — I10 ESSENTIAL HYPERTENSION: Chronic | ICD-10-CM

## 2021-12-28 DIAGNOSIS — E78.5 HYPERLIPIDEMIA LDL GOAL <70: Chronic | ICD-10-CM

## 2021-12-28 DIAGNOSIS — Z95.5 HISTORY OF CORONARY ARTERY STENT PLACEMENT: ICD-10-CM

## 2021-12-28 DIAGNOSIS — E11.65 TYPE 2 DIABETES MELLITUS WITH HYPERGLYCEMIA, WITHOUT LONG-TERM CURRENT USE OF INSULIN (HCC): Chronic | ICD-10-CM

## 2021-12-28 DIAGNOSIS — I25.2 HISTORY OF MYOCARDIAL INFARCTION: Chronic | ICD-10-CM

## 2021-12-28 DIAGNOSIS — I73.9 PVD (PERIPHERAL VASCULAR DISEASE) (HCC): ICD-10-CM

## 2021-12-28 PROCEDURE — 99214 OFFICE O/P EST MOD 30 MIN: CPT | Performed by: INTERNAL MEDICINE

## 2021-12-28 PROCEDURE — 93000 ELECTROCARDIOGRAM COMPLETE: CPT | Performed by: INTERNAL MEDICINE

## 2021-12-28 NOTE — PROGRESS NOTES
Subjective:     Encounter Date:12/28/2021      Patient ID: Jesus Cuba is a 71 y.o. male.    Chief Complaint:  History of Present Illness    This is a 71-year-old with a history of coronary artery disease status post prior stents to the mid LAD, proximal posterior descending artery, and first diagonal branch, hypertension, hyperlipidemia, diabetes mellitus type 2, hypothyroidism, who presents for follow-up.     He presents today for routine 6-month follow-up. Overall he is feeling well. He denies any recent episodes of left upper chest or shoulder pain. He denies any shortness of breath, palpitations, orthopnea, near-syncope or syncope, or lower extremity edema. He walks 2 miles about 3 times a week and has no significant issues with this. He is wondering if he can stop [employer] losing.     Prior History:  The patient was previously followed by Dr. Curiel and then most recently Dr. Tobar.  His prior cardiac history includes that of stents to his mid LAD, proximal posterior descending branch, and most recently stenting of his first diagonal branch in 7/2018 with a resolute 2.0 x 30 mm stent.  At the time of that cardiac catheterization he was noted to have 30 to 40% stenosis at the proximal edge of his widely patent mid LAD stent, severe diffuse disease of a small left circumflex artery, mild nonobstructive disease of a moderate caliber ramus branch, mild nonobstructive disease of his right coronary artery, patent stent of the proximal posterior descending branch with 50 to 60% stenosis at the proximal edge of the stent and 40 to 50% stenosis of the distal edge of the stent.  At the time of the cardiac catheterization Dr. Curiel recommended further evaluation of the posterior descending artery disease if he continued to have symptoms.       He was then seen by Dr. Tobar initially in 1/2019 when he presented for a second opinion.  At that time he reported pinpoint chest discomfort in his left chest that got better  in the past after prior coronary interventions but at the time of his office visit was still occurring despite his recent stent placement.  He also reported dyspnea on exertion and chest tightness which had improved since his stent placement.  At the time of that office visit Dr. Tobar felt that he had class I angina that should be treated medically.  He was already on isosorbide mononitrate 30 mg a day and additionally he was started on a low-dose of metoprolol tartrate and ranolazine 500 mg twice a day.  He then saw YAMILA Fregoso in follow-up in 8/2019 at which time he was feeling really well.  He denies any further anginal symptoms since the initiation of medical therapy and was able to walk 1-1/2 to 2 miles 3 times a week without any significant issues.     I saw him initially in 2/2020 at which time he was not having any significant symptoms.  He was exercising regular basis.  However his EKG did show new inferior ST changes with T wave inversions.  Due to his lack of symptoms I opted to just monitor him before proceeding with any further work-up.  In follow-up in 5/2020 he was continuing to do well and a repeat EKG had returned to baseline.  I attributed the prior abnormalities to elevated blood pressures at that time.    In 11/2020 he reported a couple episodes during the month prior where he developed left upper chest pain and left shoulder pain similar to his prior anginal symptoms after exerting himself shortly after eating.  His EKG showed improvement in inferior T wave changes.  I recommend we monitor his symptoms at that time. In follow-up the left upper chest pain improved.    Review of Systems   Constitutional: Negative for malaise/fatigue.   HENT: Negative for hearing loss, hoarse voice, nosebleeds and sore throat.    Eyes: Negative for pain.   Cardiovascular: Negative for chest pain, claudication, cyanosis, dyspnea on exertion, irregular heartbeat, leg swelling, near-syncope, orthopnea,  palpitations, paroxysmal nocturnal dyspnea and syncope.   Respiratory: Negative for shortness of breath and snoring.    Endocrine: Negative for cold intolerance, heat intolerance, polydipsia, polyphagia and polyuria.   Skin: Negative for itching and rash.   Musculoskeletal: Negative for arthritis, falls, joint pain, joint swelling, muscle cramps, muscle weakness and myalgias.   Gastrointestinal: Negative for constipation, diarrhea, dysphagia, heartburn, hematemesis, hematochezia, melena, nausea and vomiting.   Genitourinary: Negative for frequency, hematuria and hesitancy.   Neurological: Negative for excessive daytime sleepiness, dizziness, headaches, light-headedness, numbness and weakness.   Psychiatric/Behavioral: Negative for depression. The patient is not nervous/anxious.          Current Outpatient Medications:   •  ALPRAZolam (XANAX) 0.5 MG tablet, Take 1 tablet by mouth 2 (Two) Times a Day As Needed for Anxiety. for anxiety, Disp: 60 tablet, Rfl: 0  •  aspirin (aspirin) 81 MG EC tablet, Take 1 tablet by mouth Daily., Disp: , Rfl:   •  Canagliflozin (Invokana) 300 MG tablet tablet, Take 1 tablet by mouth Daily., Disp: 90 tablet, Rfl: 1  •  clopidogrel (PLAVIX) 75 MG tablet, Take 1 tablet by mouth daily., Disp: 90 tablet, Rfl: 1  •  gabapentin (NEURONTIN) 300 MG capsule, Take 1 capsule by mouth 2 (Two) Times a Day As Needed (neuropathy). Take one nightly.  Can go up to twice a day as needed., Disp: 180 capsule, Rfl: 1  •  glucose blood (ONE TOUCH ULTRA TEST) test strip, USE 1 STRIP TO CHECK GLUCOSE TWICE DAILY, Disp: 100 each, Rfl: 6  •  irbesartan (AVAPRO) 300 MG tablet, TAKE 1 TABLET EVERY DAY, Disp: 90 tablet, Rfl: 3  •  isosorbide mononitrate (IMDUR) 30 MG 24 hr tablet, Take 30 mg by mouth Daily., Disp: , Rfl: 1  •  levothyroxine (SYNTHROID, LEVOTHROID) 75 MCG tablet, TAKE 1 TABLET EVERY DAY, Disp: 90 tablet, Rfl: 0  •  metFORMIN (GLUCOPHAGE) 1000 MG tablet, TAKE 1 TABLET TWICE DAILY WITH MEALS, Disp: 180  tablet, Rfl: 0  •  metoprolol succinate XL (TOPROL-XL) 25 MG 24 hr tablet, Take 0.5 tablets by mouth Daily., Disp: 45 tablet, Rfl: 1  •  NITROSTAT 0.4 MG SL tablet, DISSOLVE ONE TABLET UNDER THE TONGUE AS NEEDED, Disp: 25 tablet, Rfl: 0  •  nystatin (MYCOSTATIN) 118288 UNIT/ML suspension, Swish and swallow 5 mL 4 (Four) Times a Day., Disp: 200 mL, Rfl: 0  •  pravastatin (PRAVACHOL) 80 MG tablet, TAKE 1 TABLET EVERY DAY, Disp: 90 tablet, Rfl: 3  •  ranolazine (RANEXA) 500 MG 12 hr tablet, Take 1 tablet by mouth 2 (Two) Times a Day., Disp: 180 tablet, Rfl: 3  •  sitaGLIPtin (JANUVIA) 100 MG tablet, Take 1 tablet by mouth daily., Disp: 60 tablet, Rfl: 0    Past Medical History:   Diagnosis Date   • CAD (coronary artery disease)    • Diabetes mellitus (HCC)    • Dyslipidemia    • Hyperlipidemia    • Hypertension    • Hypothyroidism    • Myocardial infarction (HCC)        Past Surgical History:   Procedure Laterality Date   • CARDIAC CATHETERIZATION  07/03/2018   • CARDIAC SURGERY     • CORONARY ANGIOPLASTY WITH STENT PLACEMENT      4 stents  over several years - first in 2002   • SKIN BIOPSY      shoulder left       Family History   Problem Relation Age of Onset   • Cancer Mother         skin   • COPD Mother    • Hyperlipidemia Mother    • Hypertension Mother    • Cancer Father         lung   • Diabetes Father    • Heart disease Father    • Hyperlipidemia Father    • Hypertension Father    • Early death Brother         50   • Alcohol abuse Brother    • Colon cancer Neg Hx    • Colon polyps Neg Hx        Social History     Tobacco Use   • Smoking status: Never Smoker   • Smokeless tobacco: Never Used   Substance Use Topics   • Alcohol use: Yes     Alcohol/week: 7.0 standard drinks     Types: 7 Shots of liquor per week   • Drug use: Never         ECG 12 Lead    Date/Time: 12/28/2021 4:05 PM  Performed by: Chinyere Sprague MD  Authorized by: Chinyere Sprague MD   Comparison: compared with previous ECG   Similar to previous  "ECG  Rhythm: sinus rhythm  T inversion: III, aVF, V5 and V6  Other findings: left ventricular hypertrophy with strain               Objective:     Visit Vitals  /80 (BP Location: Right arm, Patient Position: Sitting, Cuff Size: Adult)   Pulse 63   Ht 170.2 cm (67\")   Wt 76.2 kg (168 lb)   BMI 26.31 kg/m²         Constitutional:       Appearance: Normal appearance. Well-developed.   HENT:      Head: Normocephalic and atraumatic.   Neck:      Vascular: No carotid bruit or JVD.   Pulmonary:      Effort: Pulmonary effort is normal.      Breath sounds: Normal breath sounds.   Cardiovascular:      Normal rate. Regular rhythm.      No gallop.   Pulses:     Radial: 2+ bilaterally.  Edema:     Peripheral edema absent.   Abdominal:      Palpations: Abdomen is soft.   Skin:     General: Skin is warm and dry.   Neurological:      Mental Status: Alert and oriented to person, place, and time.           Assessment:          Diagnosis Plan   1. Coronary artery disease involving native coronary artery of native heart without angina pectoris     2. History of coronary artery stent placement     3. History of myocardial infarction     4. Hyperlipidemia LDL goal <70     5. Essential hypertension     6. PVD (peripheral vascular disease) ([facility])     7. Type 2 diabetes mellitus with hyperglycemia, without long-term current use of insulin ([facility])            Plan:       1. Coronary artery disease. No recent anginal symptoms. EKG appears stable. The patient is interested in stopping the ranolazine. I think is reasonable for the patient to stop the ranolazine and see how he does. If his symptoms recur he knows to go ahead and resume ranolazine at that point. Otherwise continue the remainder of his medical management.  2. Hypertension. Fairly well controlled on his current regimen medications. Continue the same.  3. Hyperlipidemia. On pravastatin for goal LDL of around 70 or below. Last lipid panel showed that his LDL was near goal around " 80.  4. Peripheral vascular disease. Moderate left lower extremity peripheral vascular disease. No symptoms of claudication. Followed by Dr. Escobedo.  5. Diabetes mellitus type 2    I will plan on seeing the patient back again in 6 months.

## 2022-01-08 DIAGNOSIS — G58.9 MONONEUROPATHY: Chronic | ICD-10-CM

## 2022-01-09 RX ORDER — GABAPENTIN 300 MG/1
CAPSULE ORAL
Qty: 180 CAPSULE | Refills: 0 | Status: SHIPPED | OUTPATIENT
Start: 2022-01-09 | End: 2022-04-08

## 2022-01-21 DIAGNOSIS — E03.4 HYPOTHYROIDISM DUE TO ACQUIRED ATROPHY OF THYROID: ICD-10-CM

## 2022-01-21 RX ORDER — LEVOTHYROXINE SODIUM 0.07 MG/1
TABLET ORAL
Qty: 90 TABLET | Refills: 3 | Status: SHIPPED | OUTPATIENT
Start: 2022-01-21 | End: 2022-11-04

## 2022-01-27 DIAGNOSIS — E11.9 DIABETES MELLITUS WITHOUT COMPLICATION: ICD-10-CM

## 2022-03-02 DIAGNOSIS — I10 ESSENTIAL HYPERTENSION: Chronic | ICD-10-CM

## 2022-03-02 RX ORDER — METOPROLOL SUCCINATE 25 MG/1
TABLET, EXTENDED RELEASE ORAL
Qty: 45 TABLET | Refills: 2 | Status: SHIPPED | OUTPATIENT
Start: 2022-03-02 | End: 2022-10-17

## 2022-03-14 RX ORDER — RANOLAZINE 500 MG/1
TABLET, EXTENDED RELEASE ORAL
Qty: 180 TABLET | Refills: 1 | Status: SHIPPED | OUTPATIENT
Start: 2022-03-14 | End: 2023-01-23

## 2022-04-06 DIAGNOSIS — G58.9 MONONEUROPATHY: Chronic | ICD-10-CM

## 2022-04-08 RX ORDER — GABAPENTIN 300 MG/1
CAPSULE ORAL
Qty: 180 CAPSULE | Refills: 0 | Status: SHIPPED | OUTPATIENT
Start: 2022-04-08 | End: 2022-05-23

## 2022-04-11 DIAGNOSIS — E11.9 DIABETES MELLITUS WITHOUT COMPLICATION: ICD-10-CM

## 2022-04-18 ENCOUNTER — TELEPHONE (OUTPATIENT)
Dept: CARDIOLOGY | Facility: CLINIC | Age: 72
End: 2022-04-18

## 2022-04-18 NOTE — TELEPHONE ENCOUNTER
Patient calling wanting to know if you can take him on as a patient.  Juan Moody referred him to you.  [phone]

## 2022-04-20 NOTE — TELEPHONE ENCOUNTER
While I am sure that Dr. Sprague is fine Juan is one of my best friends so of course we will see him

## 2022-05-03 RX ORDER — CANAGLIFLOZIN 300 MG/1
TABLET, FILM COATED ORAL
Qty: 90 TABLET | Refills: 1 | Status: SHIPPED | OUTPATIENT
Start: 2022-05-03 | End: 2022-09-08 | Stop reason: SDUPTHER

## 2022-05-23 ENCOUNTER — OFFICE VISIT (OUTPATIENT)
Dept: INTERNAL MEDICINE | Facility: CLINIC | Age: 72
End: 2022-05-23

## 2022-05-23 VITALS
HEIGHT: 67 IN | HEART RATE: 70 BPM | TEMPERATURE: 97.8 F | WEIGHT: 166.8 LBS | SYSTOLIC BLOOD PRESSURE: 114 MMHG | DIASTOLIC BLOOD PRESSURE: 68 MMHG | BODY MASS INDEX: 26.18 KG/M2 | OXYGEN SATURATION: 97 %

## 2022-05-23 DIAGNOSIS — E55.9 VITAMIN D DEFICIENCY: ICD-10-CM

## 2022-05-23 DIAGNOSIS — I10 ESSENTIAL HYPERTENSION: Primary | Chronic | ICD-10-CM

## 2022-05-23 DIAGNOSIS — E78.5 HYPERLIPIDEMIA LDL GOAL <70: Chronic | ICD-10-CM

## 2022-05-23 DIAGNOSIS — G58.9 MONONEUROPATHY: Chronic | ICD-10-CM

## 2022-05-23 DIAGNOSIS — I25.10 CORONARY ARTERY DISEASE INVOLVING NATIVE CORONARY ARTERY OF NATIVE HEART WITHOUT ANGINA PECTORIS: Chronic | ICD-10-CM

## 2022-05-23 DIAGNOSIS — E11.65 TYPE 2 DIABETES MELLITUS WITH HYPERGLYCEMIA, WITHOUT LONG-TERM CURRENT USE OF INSULIN: ICD-10-CM

## 2022-05-23 DIAGNOSIS — E03.4 HYPOTHYROIDISM DUE TO ACQUIRED ATROPHY OF THYROID: Chronic | ICD-10-CM

## 2022-05-23 PROCEDURE — 99214 OFFICE O/P EST MOD 30 MIN: CPT | Performed by: NURSE PRACTITIONER

## 2022-05-23 RX ORDER — GABAPENTIN 300 MG/1
300 CAPSULE ORAL 3 TIMES DAILY PRN
Qty: 270 CAPSULE | Refills: 1 | Status: SHIPPED | OUTPATIENT
Start: 2022-05-23 | End: 2023-02-06

## 2022-05-23 NOTE — ASSESSMENT & PLAN NOTE
Lipid abnormalities are improving with treatment.  Pharmacotherapy as ordered.  Lipids will be reassessed in 6 months.    Lab Results   Component Value Date    CHLPL 178 11/22/2021    TRIG 378 (H) 11/22/2021    HDL 37 (L) 11/22/2021    LDL 80 11/22/2021     Your triglycerides are elevated. You should cut back on sugar, carbohydrates (including white breads or items made with white flour), and limit alcohol if your currently consume. Increase your exercise level.

## 2022-05-23 NOTE — ASSESSMENT & PLAN NOTE
Diabetes is worsening.   Reminded to get yearly retinal exam.  Diabetes will be reassessed in 6 months.    Your last A1C (3 month average) =   Lab Results   Component Value Date    HGBA1C 8.8 (H) 11/22/2021      Your diabetes is Uncontrolled.  Needs to significantly work on diet.  Doesn't seem related to sugars.  His diet is heavily laden with carbohydrates and needs to exercise more.     Discussed: may need to change to long acting insulin to get better control.     Eye Health:   You need a diabetic eye exam yearly.   Please have a copy of the note faxed to my office.   Fax: [phone]    Foot Health:   You need a diabetic foot exam yearly.   Check your feet routinely for any wounds.   You should always check your shoes for any debris that could cause a wound.

## 2022-05-23 NOTE — PROGRESS NOTES
Chief Complaint  Diabetes and Hypertension (6 month follow up )     Subjective:      History of Present Illness {CC  Problem List  Visit  Diagnosis   Encounters  Notes  Medications  Labs  Result Review Imaging  Media :23}     Jesus Cuba presents to [facility] for:  multiple chronic medical conditions including: hypertension, HLD, DMII w PN, CAD (stent to LAD 7/2018), GERD, Anxiety, hypothyroid     Last visit: A1C increased.  Stated he had COVID and was not good with diet.     Diabetes: chronic, not controlled.   Diet is still an issues.  Diet recall yesterday: sausage, hash browns, toast, 2 hamburgers, Mac & Cheese   PN: gabapentin mildly effective     Hypertension: no CP, SOA    Hypothyroid: continues synthroid     Vitamin d deficiency: continues supplement      I have reviewed patient's medical history, any new submitted information provided by patient or medical assistant and updated medical record.      Objective:      Physical Exam  Vitals reviewed.   Constitutional:       Appearance: Normal appearance. He is well-developed. He is obese.   HENT:      Head:      Comments: Wearing mask due to COVID   Neck:      Thyroid: No thyromegaly.   Cardiovascular:      Rate and Rhythm: Normal rate and regular rhythm.      Pulses: Normal pulses.      Heart sounds: Normal heart sounds.   Pulmonary:      Effort: Pulmonary effort is normal.      Breath sounds: Normal breath sounds.      Comments: E/U   Abdominal:      General: Bowel sounds are normal.      Palpations: Abdomen is soft.   Musculoskeletal:         General: Normal range of motion.      Cervical back: Normal range of motion and neck supple.      Right lower leg: No edema.      Left lower leg: No edema.   Lymphadenopathy:      Cervical: No cervical adenopathy.   Skin:     General: Skin is warm and dry.      Capillary Refill: Capillary refill takes 2 to 3 seconds.   Neurological:      Mental Status: He is alert and  "oriented to person, place, and time.   Psychiatric:         Mood and Affect: Mood normal.         Behavior: Behavior normal. Behavior is cooperative.         Thought Content: Thought content normal.         Judgment: Judgment normal.        Result Review  Data Reviewed:{ Labs  Result Review  Imaging  Med Tab  Media :23}     The following data was reviewed by: Rigoberto Lowe III, NP-C on 05/23/2022  Common labs    Common Labsle 6/24/21 6/24/21 6/24/21 11/22/21 11/22/21 11/22/21 11/22/21    0904 0904 0904 0856 0856 0856 0856   Glucose   124 (A) 212 (A)      BUN   16 18      Creatinine   0.99 1.10      eGFR Non  Am   75 68      eGFR African Am   91 78      Sodium   140 140      Potassium   4.7 4.9      Chloride   105 104      Calcium   9.6 9.5      Total Protein    7.0      Albumin    4.3      Total Bilirubin    0.3      Alkaline Phosphatase    90      AST (SGOT)    11      ALT (SGPT)    23      WBC      7.2    Hemoglobin      14.7    Hematocrit      43.8    Platelets      238    Total Cholesterol 163    178     Triglycerides 216 (A)    378 (A)     HDL Cholesterol 39 (A)    37 (A)     LDL Cholesterol  87    80     Hemoglobin A1C  8.70 (A)     8.8 (A)   (A) Abnormal value       Comments are available for some flowsheets but are not being displayed.                  Vital Signs:   /68 (BP Location: Left arm, Patient Position: Sitting, Cuff Size: Adult)   Pulse 70   Temp 97.8 °F (36.6 °C) (Temporal)   Ht 170.2 cm (67\")   Wt 75.7 kg (166 lb 12.8 oz)   SpO2 97%   BMI 26.12 kg/m²         Requested Prescriptions     Signed Prescriptions Disp Refills   • SITagliptin (Januvia) 100 MG tablet 90 tablet 2     Sig: Take 1 tablet by mouth Daily.       Routine medications provided by this office will also be refilled via pharmacy request.       Current Outpatient Medications:   •  ALPRAZolam (XANAX) 0.5 MG tablet, Take 1 tablet by mouth 2 (Two) Times a Day As Needed for Anxiety. for anxiety, Disp: " 60 tablet, Rfl: 0  •  aspirin (aspirin) 81 MG EC tablet, Take 1 tablet by mouth Daily., Disp: , Rfl:   •  clopidogrel (PLAVIX) 75 MG tablet, Take 1 tablet by mouth daily., Disp: 90 tablet, Rfl: 1  •  gabapentin (NEURONTIN) 300 MG capsule, TAKE 1 CAPSULE BY MOUTH NIGHTLY AS NEEDED FOR NEUROPATHY, CAN GO UP TO TWICE A DAY AS NEEDED, Disp: 180 capsule, Rfl: 0  •  glucose blood (ONE TOUCH ULTRA TEST) test strip, USE 1 STRIP TO CHECK GLUCOSE TWICE DAILY, Disp: 100 each, Rfl: 6  •  Invokana 300 MG tablet tablet, TAKE 1 TABLET EVERY DAY, Disp: 90 tablet, Rfl: 1  •  irbesartan (AVAPRO) 300 MG tablet, TAKE 1 TABLET EVERY DAY, Disp: 90 tablet, Rfl: 3  •  isosorbide mononitrate (IMDUR) 30 MG 24 hr tablet, Take 30 mg by mouth Daily., Disp: , Rfl: 1  •  levothyroxine (SYNTHROID, LEVOTHROID) 75 MCG tablet, TAKE 1 TABLET EVERY DAY, Disp: 90 tablet, Rfl: 3  •  metFORMIN (GLUCOPHAGE) 1000 MG tablet, TAKE 1 TABLET TWICE DAILY WITH MEALS, Disp: 180 tablet, Rfl: 0  •  metoprolol succinate XL (TOPROL-XL) 25 MG 24 hr tablet, TAKE 1/2 TABLET EVERY DAY, Disp: 45 tablet, Rfl: 2  •  NITROSTAT 0.4 MG SL tablet, DISSOLVE ONE TABLET UNDER THE TONGUE AS NEEDED, Disp: 25 tablet, Rfl: 0  •  nystatin (MYCOSTATIN) 407870 UNIT/ML suspension, Swish and swallow 5 mL 4 (Four) Times a Day., Disp: 200 mL, Rfl: 0  •  pravastatin (PRAVACHOL) 80 MG tablet, TAKE 1 TABLET EVERY DAY, Disp: 90 tablet, Rfl: 3  •  ranolazine (RANEXA) 500 MG 12 hr tablet, TAKE 1 TABLET TWICE DAILY, Disp: 180 tablet, Rfl: 1  •  SITagliptin (Januvia) 100 MG tablet, Take 1 tablet by mouth Daily., Disp: 90 tablet, Rfl: 2     Assessment and Plan:      Assessment and Plan {CC Problem List  Visit Diagnosis  ROS  Review (Popup)  Health Maintenance  Quality  BestPractice  Medications  SmartSets  SnapShot Encounters  Media :23}     Problem List Items Addressed This Visit        Cardiac and Vasculature    CAD (coronary artery disease) (Chronic)    Overview     Due to the presence  of severe small vessel disease and multiple stents: Cardiology recommended stay on DAPT as long as he tolerates.           Essential hypertension - Primary (Chronic)    Current Assessment & Plan     Hypertension is improving with treatment.  Continue current treatment regimen.  Dietary sodium restriction.  Weight loss.  Regular aerobic exercise.  Continue current medications.  Blood pressure will be reassessed at the next regular appointment.           Relevant Orders    Basic Metabolic Panel    Hyperlipidemia LDL goal <70 (Chronic)    Current Assessment & Plan     Lipid abnormalities are improving with treatment.  Pharmacotherapy as ordered.  Lipids will be reassessed in 6 months.    Lab Results   Component Value Date    CHLPL 178 11/22/2021    TRIG 378 (H) 11/22/2021    HDL 37 (L) 11/22/2021    LDL 80 11/22/2021     Your triglycerides are elevated. You should cut back on sugar, carbohydrates (including white breads or items made with white flour), and limit alcohol if your currently consume. Increase your exercise level.                 Endocrine and Metabolic    Hypothyroidism (Chronic)    Current Assessment & Plan     Stable   Check lab for ongoing therapeutic drug monitoring            Relevant Orders    TSH    T4, free    Type 2 diabetes mellitus with hyperglycemia, without long-term current use of insulin (HCC) (Chronic)    Current Assessment & Plan     Diabetes is worsening.   Reminded to get yearly retinal exam.  Diabetes will be reassessed in 6 months.    Your last A1C (3 month average) =   Lab Results   Component Value Date    HGBA1C 8.8 (H) 11/22/2021      Your diabetes is Uncontrolled.  Needs to significantly work on diet.  Doesn't seem related to sugars.  His diet is heavily laden with carbohydrates and needs to exercise more.     Discussed: may need to change to long acting insulin to get better control.     Eye Health:   You need a diabetic eye exam yearly.   Please have a copy of the note faxed to  my office.   Fax: [phone]    Foot Health:   You need a diabetic foot exam yearly.   Check your feet routinely for any wounds.   You should always check your shoes for any debris that could cause a wound.              Relevant Medications    SITagliptin (Januvia) 100 MG tablet    Other Relevant Orders    Hemoglobin A1c    Vitamin D deficiency    Overview     Takes daily supplement.            Relevant Orders    Vitamin D 25 hydroxy       Neuro    PN (peripheral neuropathy) (Chronic)    Overview     1/19/21: Started gabapentin            Current Assessment & Plan     Continue gabapentin.  Will check b-12 due to long term use metformin            Relevant Orders    Vitamin B12          Follow Up {Instructions Charge Capture  Follow-up Communications :23}     Return in about 6 months (around 11/23/2022) for Medicare Wellness.      Patient was given instructions and counseling regarding his condition or for health maintenance advice. Please see specific information pulled into the AVS if appropriate.    Maria T disclaimer:   Much of this encounter note is an electronic transcription/translation of spoken language to printed text. The electronic translation of spoken language may permit erroneous, or at times, nonsensical words or phrases to be inadvertently transcribed; Although I have reviewed the note for such errors, some may still exist.     Additional Patient Counseling:       There are no Patient Instructions on file for this visit.

## 2022-05-24 LAB
25(OH)D3+25(OH)D2 SERPL-MCNC: 26.4 NG/ML (ref 30–100)
BUN SERPL-MCNC: 23 MG/DL (ref 8–27)
BUN/CREAT SERPL: 21 (ref 10–24)
CALCIUM SERPL-MCNC: 9.3 MG/DL (ref 8.6–10.2)
CHLORIDE SERPL-SCNC: 102 MMOL/L (ref 96–106)
CO2 SERPL-SCNC: 21 MMOL/L (ref 20–29)
CREAT SERPL-MCNC: 1.08 MG/DL (ref 0.76–1.27)
EGFRCR SERPLBLD CKD-EPI 2021: 73 ML/MIN/1.73
GLUCOSE SERPL-MCNC: 188 MG/DL (ref 65–99)
HBA1C MFR BLD: 9.4 % (ref 4.8–5.6)
POTASSIUM SERPL-SCNC: 4.4 MMOL/L (ref 3.5–5.2)
SODIUM SERPL-SCNC: 139 MMOL/L (ref 134–144)
T4 FREE SERPL-MCNC: 1.57 NG/DL (ref 0.82–1.77)
TSH SERPL DL<=0.005 MIU/L-ACNC: 2.19 UIU/ML (ref 0.45–4.5)
VIT B12 SERPL-MCNC: 329 PG/ML (ref 232–1245)

## 2022-06-22 DIAGNOSIS — E78.5 HYPERLIPIDEMIA LDL GOAL <70: Chronic | ICD-10-CM

## 2022-06-22 RX ORDER — IRBESARTAN 300 MG/1
TABLET ORAL
Qty: 90 TABLET | Refills: 3 | Status: SHIPPED | OUTPATIENT
Start: 2022-06-22

## 2022-06-22 RX ORDER — PRAVASTATIN SODIUM 80 MG/1
TABLET ORAL
Qty: 90 TABLET | Refills: 3 | Status: SHIPPED | OUTPATIENT
Start: 2022-06-22

## 2022-07-26 ENCOUNTER — TELEPHONE (OUTPATIENT)
Dept: INTERNAL MEDICINE | Facility: CLINIC | Age: 72
End: 2022-07-26

## 2022-07-26 NOTE — TELEPHONE ENCOUNTER
Caller: Jesus Cuba    Relationship: Self    Best call back number: [phone]    What is the best time to reach you: ANYTIME    Who are you requesting to speak with (clinical staff, provider,  specific staff member): ELTON HANEY    What was the call regarding: PATIENT IS REQUESTING SAMPLE OF JANUVIA IF ELTON HANEY HAS ANY THAT HE CAN COME .     PLEASE CALL PATIENT BACK.

## 2022-08-16 ENCOUNTER — OFFICE VISIT (OUTPATIENT)
Dept: CARDIOLOGY | Facility: CLINIC | Age: 72
End: 2022-08-16

## 2022-08-16 VITALS
HEIGHT: 67 IN | WEIGHT: 167.4 LBS | BODY MASS INDEX: 26.27 KG/M2 | SYSTOLIC BLOOD PRESSURE: 122 MMHG | DIASTOLIC BLOOD PRESSURE: 60 MMHG | HEART RATE: 74 BPM

## 2022-08-16 DIAGNOSIS — E78.5 HYPERLIPIDEMIA LDL GOAL <70: Chronic | ICD-10-CM

## 2022-08-16 DIAGNOSIS — Z86.79 HISTORY OF ASCVD: Primary | ICD-10-CM

## 2022-08-16 DIAGNOSIS — I10 ESSENTIAL HYPERTENSION: Chronic | ICD-10-CM

## 2022-08-16 PROCEDURE — 93000 ELECTROCARDIOGRAM COMPLETE: CPT | Performed by: INTERNAL MEDICINE

## 2022-08-16 PROCEDURE — 99214 OFFICE O/P EST MOD 30 MIN: CPT | Performed by: INTERNAL MEDICINE

## 2022-08-16 NOTE — PROGRESS NOTES
Date of Office Visit: 22  Encounter Provider: Branden Dey MD  Place of Service: [facility] CARDIOLOGY  Patient Name: Jesus Cuba  :1950  [id number]    Chief Complaint   Patient presents with   • Coronary Artery Disease   :     HPI: Jesus Cuba is a 72 y.o. male this is a gentleman that had a long history of coronary artery disease his first stents were placed in  he has had further stenting in the past I think to his LAD and into his PDA and his last stent was in 2018 with to a diagonal.  He has worked at [employer] in the past.  He has diabetes he cannot really afford insulin he says he is on 3 other medications but his A1c is still 9.  He has not had any chest discomfort no PND no orthopnea no edema he gets a little bit of claudication when he walks if he stops it goes away there has not been any change in that.  He drinks 4-5 drinks on Friday and Saturday night usually.  He does not smoke his blood pressures been pretty well controlled    Past Medical History:   Diagnosis Date   • CAD (coronary artery disease)    • Diabetes mellitus (HCC)    • Dyslipidemia    • Hyperlipidemia    • Hypertension    • Hypothyroidism    • Myocardial infarction (HCC)        Past Surgical History:   Procedure Laterality Date   • CARDIAC CATHETERIZATION  2018   • CARDIAC SURGERY     • CORONARY ANGIOPLASTY WITH STENT PLACEMENT      4 stents  over several years - first in    • SKIN BIOPSY      shoulder left       Social History     Socioeconomic History   • Marital status: Single   Tobacco Use   • Smoking status: Never Smoker   • Smokeless tobacco: Never Used   Substance and Sexual Activity   • Alcohol use: Yes     Alcohol/week: 7.0 standard drinks     Types: 7 Shots of liquor per week   • Drug use: Never   • Sexual activity: Yes     Partners: Female       Family History   Problem Relation Age of Onset   • Cancer Mother         skin   • COPD Mother    •  Hyperlipidemia Mother    • Hypertension Mother    • Cancer Father         lung   • Diabetes Father    • Heart disease Father    • Hyperlipidemia Father    • Hypertension Father    • Early death Brother         50   • Alcohol abuse Brother    • Colon cancer Neg Hx    • Colon polyps Neg Hx        Review of Systems   Constitutional: Negative for decreased appetite, fever, malaise/fatigue and weight loss.   HENT: Negative for nosebleeds.    Eyes: Negative for double vision.   Cardiovascular: Negative for chest pain, claudication, cyanosis, dyspnea on exertion, irregular heartbeat, leg swelling, near-syncope, orthopnea, palpitations, paroxysmal nocturnal dyspnea and syncope.   Respiratory: Negative for cough, hemoptysis and shortness of breath.    Hematologic/Lymphatic: Negative for bleeding problem.   Skin: Negative for rash.   Musculoskeletal: Negative for falls and myalgias.   Gastrointestinal: Negative for hematochezia, jaundice, melena, nausea and vomiting.   Genitourinary: Negative for hematuria.   Neurological: Negative for dizziness and seizures.   Psychiatric/Behavioral: Negative for altered mental status and memory loss.       No Known Allergies      Current Outpatient Medications:   •  ALPRAZolam (XANAX) 0.5 MG tablet, Take 1 tablet by mouth 2 (Two) Times a Day As Needed for Anxiety. for anxiety, Disp: 60 tablet, Rfl: 0  •  aspirin (aspirin) 81 MG EC tablet, Take 1 tablet by mouth Daily., Disp: , Rfl:   •  clopidogrel (PLAVIX) 75 MG tablet, Take 1 tablet by mouth daily., Disp: 90 tablet, Rfl: 1  •  gabapentin (NEURONTIN) 300 MG capsule, Take 1 capsule by mouth 3 (Three) Times a Day As Needed (pain)., Disp: 270 capsule, Rfl: 1  •  glucose blood (ONE TOUCH ULTRA TEST) test strip, USE 1 STRIP TO CHECK GLUCOSE TWICE DAILY, Disp: 100 each, Rfl: 6  •  Invokana 300 MG tablet tablet, TAKE 1 TABLET EVERY DAY, Disp: 90 tablet, Rfl: 1  •  irbesartan (AVAPRO) 300 MG tablet, TAKE 1 TABLET EVERY DAY, Disp: 90 tablet, Rfl:  "3  •  isosorbide mononitrate (IMDUR) 30 MG 24 hr tablet, Take 30 mg by mouth Daily., Disp: , Rfl: 1  •  levothyroxine (SYNTHROID, LEVOTHROID) 75 MCG tablet, TAKE 1 TABLET EVERY DAY, Disp: 90 tablet, Rfl: 3  •  metFORMIN (GLUCOPHAGE) 1000 MG tablet, TAKE 1 TABLET TWICE DAILY WITH MEALS, Disp: 180 tablet, Rfl: 0  •  metoprolol succinate XL (TOPROL-XL) 25 MG 24 hr tablet, TAKE 1/2 TABLET EVERY DAY, Disp: 45 tablet, Rfl: 2  •  NITROSTAT 0.4 MG SL tablet, DISSOLVE ONE TABLET UNDER THE TONGUE AS NEEDED, Disp: 25 tablet, Rfl: 0  •  pravastatin (PRAVACHOL) 80 MG tablet, TAKE 1 TABLET EVERY DAY, Disp: 90 tablet, Rfl: 3  •  ranolazine (RANEXA) 500 MG 12 hr tablet, TAKE 1 TABLET TWICE DAILY (Patient taking differently: 1 (One) Time.), Disp: 180 tablet, Rfl: 1  •  SITagliptin (Januvia) 100 MG tablet, Take 1 tablet by mouth Daily., Disp: 90 tablet, Rfl: 2  •  nystatin (MYCOSTATIN) 394354 UNIT/ML suspension, Swish and swallow 5 mL 4 (Four) Times a Day., Disp: 200 mL, Rfl: 0      Objective:     Vitals:    08/16/22 1328   BP: 122/60   Pulse: 74   Weight: 75.9 kg (167 lb 6.4 oz)   Height: 170.2 cm (67\")     Body mass index is 26.22 kg/m².    Constitutional:       Appearance: Well-developed.   Eyes:      General: No scleral icterus.  HENT:      Head: Normocephalic.   Neck:      Thyroid: No thyromegaly.      Vascular: No JVD.      Lymphadenopathy: No cervical adenopathy.   Pulmonary:      Effort: Pulmonary effort is normal.      Breath sounds: Normal breath sounds. No wheezing. No rales.   Cardiovascular:      Normal rate. Regular rhythm.      No gallop.   Edema:     Peripheral edema absent.   Abdominal:      Palpations: Abdomen is soft.      Tenderness: There is no abdominal tenderness.   Musculoskeletal: Normal range of motion. Skin:     General: Skin is warm and dry.      Findings: No rash.   Neurological:      Mental Status: Alert and oriented to person, place, and time.           ECG 12 Lead    Date/Time: 8/16/2022 2:29 " PM  Performed by: Branden Dey MD  Authorized by: Branden Dey MD   Comparison: compared with previous ECG   Similar to previous ECG  Rhythm: sinus rhythm  Other findings: non-specific ST-T wave changes    Clinical impression: abnormal EKG             Assessment:       Diagnosis Plan   1. History of ASCVD     2. Hyperlipidemia LDL goal <70     3. Essential hypertension            Plan:       Well I think that in general Dominic seems like he is doing okay.  I think the critical thing for him his risk modification we have to showed his coronary disease down he was intolerant of Crestor and Lipitor they both cause myalgias that is how he ended up being on Pravachol and he is fine on that.  I will not stop his aspirin and ask him to continue to be active I will see him back in 6 months and we will go from there    No follow-ups on file.     As always, it has been a pleasure to participate in your patient's care.      Sincerely,       Branden Dey MD

## 2022-09-06 ENCOUNTER — TELEPHONE (OUTPATIENT)
Dept: INTERNAL MEDICINE | Facility: CLINIC | Age: 72
End: 2022-09-06

## 2022-09-06 NOTE — TELEPHONE ENCOUNTER
PATIENT CALLED REQUESTING MEDICATION SAMPLES    Invokana 300 MG tablet tablet    SITagliptin (Januvia) 100 MG tablet    HE WILL BE OUT IN ABOUT 10 DAYS LEFT    HE IS IN THE [facility] WITH INSURANCE.    PLEASE CALL [phone]  IF SAMPLES ARE AVAILABLE     IF NO SAMPLES AVAILABLE HE WILL NEED A WRITTEN PRESCRIPTION OF   Invokana 300 MG tablet tablet    HE WILL SEND TO [facility].

## 2022-09-07 ENCOUNTER — TELEPHONE (OUTPATIENT)
Dept: INTERNAL MEDICINE | Facility: CLINIC | Age: 72
End: 2022-09-07

## 2022-09-07 NOTE — TELEPHONE ENCOUNTER
Caller: Jesus Cuba    Relationship: Self    Best call back number:[phone]    What is the best time to reach you: ANY TIME    Who are you requesting to speak with (clinical staff, provider,  specific staff member): CLINICAL STAFF    What was the call regarding:   PATIENT WANTS TO KNOW IF WE HAVE ANY JUANIVIA 100 MG SAMPLES AND INVOKANA 300 MG SAMPLES.  HIS INSURANCE DOES NOT COVER THESE AND HE HAS HIGH CO-PAY.    HE ALSO NEEDS A WRITTEN PRESCRIPTION FOR THE INVOKANA 300 MG.      Do you require a callback: YES

## 2022-09-08 RX ORDER — CANAGLIFLOZIN 300 MG/1
1 TABLET, FILM COATED ORAL DAILY
Qty: 90 TABLET | Refills: 1 | Status: SHIPPED | OUTPATIENT
Start: 2022-09-08

## 2022-09-08 NOTE — TELEPHONE ENCOUNTER
Left patient a voicemail letting him know his samples of janvia 100 mg and printed out prescription for patient

## 2022-09-08 NOTE — TELEPHONE ENCOUNTER
Hub staff attempted to follow warm transfer process and was unsuccessful     Caller: Jesus Cuba    Relationship to patient: Self    Best call back number:     Patient is needing: A RETURN CALL AS HE IS RETURNING A CALL TO THE OFFICE

## 2022-10-06 DIAGNOSIS — E11.65 TYPE 2 DIABETES MELLITUS WITH HYPERGLYCEMIA, WITHOUT LONG-TERM CURRENT USE OF INSULIN: ICD-10-CM

## 2022-10-06 RX ORDER — BLOOD-GLUCOSE METER
KIT MISCELLANEOUS
Qty: 1 EACH | Refills: 0 | Status: SHIPPED | OUTPATIENT
Start: 2022-10-06

## 2022-10-06 RX ORDER — LANCETS
EACH MISCELLANEOUS
Qty: 100 EACH | Refills: 2 | Status: SHIPPED | OUTPATIENT
Start: 2022-10-06 | End: 2023-10-06

## 2022-10-17 DIAGNOSIS — I10 ESSENTIAL HYPERTENSION: Chronic | ICD-10-CM

## 2022-10-17 RX ORDER — METOPROLOL SUCCINATE 25 MG/1
TABLET, EXTENDED RELEASE ORAL
Qty: 45 TABLET | Refills: 2 | Status: SHIPPED | OUTPATIENT
Start: 2022-10-17

## 2022-10-17 NOTE — TELEPHONE ENCOUNTER
Last OV 8/16/22.  Next OV 3/1/23.  Labs 5/23/22.  [facility]

## 2022-10-19 ENCOUNTER — OFFICE VISIT (OUTPATIENT)
Dept: INTERNAL MEDICINE | Facility: CLINIC | Age: 72
End: 2022-10-19

## 2022-10-19 VITALS
TEMPERATURE: 98 F | DIASTOLIC BLOOD PRESSURE: 78 MMHG | WEIGHT: 161.5 LBS | HEART RATE: 72 BPM | SYSTOLIC BLOOD PRESSURE: 119 MMHG | BODY MASS INDEX: 25.35 KG/M2 | HEIGHT: 67 IN | OXYGEN SATURATION: 97 %

## 2022-10-19 DIAGNOSIS — E11.65 TYPE 2 DIABETES MELLITUS WITH HYPERGLYCEMIA, WITHOUT LONG-TERM CURRENT USE OF INSULIN: Chronic | ICD-10-CM

## 2022-10-19 DIAGNOSIS — R35.1 BPH ASSOCIATED WITH NOCTURIA: Primary | Chronic | ICD-10-CM

## 2022-10-19 DIAGNOSIS — N40.1 BPH ASSOCIATED WITH NOCTURIA: Primary | Chronic | ICD-10-CM

## 2022-10-19 DIAGNOSIS — Z12.5 SPECIAL SCREENING, PROSTATE CANCER: ICD-10-CM

## 2022-10-19 DIAGNOSIS — M47.9 OSTEOARTHRITIS OF SPINE, UNSPECIFIED SPINAL OSTEOARTHRITIS COMPLICATION STATUS, UNSPECIFIED SPINAL REGION: ICD-10-CM

## 2022-10-19 PROCEDURE — 99214 OFFICE O/P EST MOD 30 MIN: CPT | Performed by: NURSE PRACTITIONER

## 2022-10-19 RX ORDER — MELOXICAM 15 MG/1
15 TABLET ORAL DAILY
Qty: 30 TABLET | Refills: 0 | Status: SHIPPED | OUTPATIENT
Start: 2022-10-19 | End: 2022-11-14

## 2022-10-19 NOTE — PROGRESS NOTES
Chief Complaint  Back Pain (Lower back pain )     Subjective:      History of Present Illness {CC  Problem List  Visit  Diagnosis   Encounters  Notes  Medications  Labs  Result Review Imaging  Media :23}     Jesus Cuba presents to [facility] PRIMARY [facility]ARE for:      Diabetes: Last visit: A1C up to 9.4%  Today he states he has really modified his die.  Stopped sodas and potatoes and walking more. Would like A1C rechecked today.   Glucose at home down from 175 to 125    Metformin: he has been taking after breakfast and at bed time.  Occasional diarrhea.  Advised to change 2nd dose to dinner.       BPH: due to PSA checked. Some nocturia - discussed flomax but states had retrograde ejaculation and does not want to restart.    Gets injections for erectile dysfunction at urology.   They had discussed US treatment but declined due to cost.     OA: lower back: worse now walking more.  No weakness in legs.     I have reviewed patient's medical history, any new submitted information provided by patient or medical assistant and updated medical record.      Objective:      Physical Exam  Vitals reviewed.   Constitutional:       Appearance: Normal appearance. He is well-developed.   HENT:      Head:      Comments: Wearing mask due to COVID   Neck:      Thyroid: No thyromegaly.   Cardiovascular:      Rate and Rhythm: Normal rate and regular rhythm.      Pulses: Normal pulses.      Heart sounds: Normal heart sounds.   Pulmonary:      Effort: Pulmonary effort is normal.      Breath sounds: Normal breath sounds.      Comments: E/U   Musculoskeletal:      Lumbar back: No swelling or deformity. Negative right straight leg raise test and negative left straight leg raise test.   Feet:      Right foot:      Protective Sensation: 4 sites tested. 4 sites sensed.      Left foot:      Protective Sensation: 4 sites sensed.      Comments: Diabetic Foot Exam Performed and Monofilament Test  "Performed  Skin:     General: Skin is warm and dry.      Capillary Refill: Capillary refill takes 2 to 3 seconds.   Neurological:      Mental Status: He is alert and oriented to person, place, and time.   Psychiatric:         Mood and Affect: Mood normal.         Behavior: Behavior normal. Behavior is cooperative.         Thought Content: Thought content normal.         Judgment: Judgment normal.        Result Review  Data Reviewed:{ Labs  Result Review  Imaging  Med Tab  Media :23}     The following data was reviewed by: Rigoberto Lowe III, NP-C on 10/19/2022  Common labs    Common Labs 11/22/21 11/22/21 11/22/21 11/22/21 5/23/22 5/23/22    0856 [id number] 0856 [id number] [id number]   Glucose 212 (A)    188 (A)    BUN 18    23    Creatinine 1.10    1.08    eGFR Non  Am 68        eGFR African Am 78        Sodium 140    139    Potassium 4.9    4.4    Chloride 104    102    Calcium 9.5    9.3    Total Protein 7.0        Albumin 4.3        Total Bilirubin 0.3        Alkaline Phosphatase 90        AST (SGOT) 11        ALT (SGPT) 23        WBC   7.2      Hemoglobin   14.7      Hematocrit   43.8      Platelets   238      Total Cholesterol  178       Triglycerides  378 (A)       HDL Cholesterol  37 (A)       LDL Cholesterol   80       Hemoglobin A1C    8.8 (A)  9.4 (A)   (A) Abnormal value       Comments are available for some flowsheets but are not being displayed.                  Vital Signs:   /78 (BP Location: Left arm, Patient Position: Sitting, Cuff Size: Adult)   Pulse 72   Temp 98 °F (36.7 °C) (Temporal)   Ht 170.2 cm (67\")   Wt 73.3 kg (161 lb 8 oz)   SpO2 97%   BMI 25.29 kg/m²         Requested Prescriptions     Signed Prescriptions Disp Refills   • meloxicam (Mobic) 15 MG tablet 30 tablet 0     Sig: Take 1 tablet by mouth Daily. Take with food.       Routine medications provided by this office will also be refilled via pharmacy request.       Current Outpatient Medications:   •  Accu-Chek " Softclix Lancets lancets, Use to Check Glucose Twice daily, Disp: 100 each, Rfl: 2  •  Canagliflozin (Invokana) 300 MG tablet tablet, Take 1 tablet by mouth Daily., Disp: 90 tablet, Rfl: 1  •  clopidogrel (PLAVIX) 75 MG tablet, Take 1 tablet by mouth daily., Disp: 90 tablet, Rfl: 1  •  gabapentin (NEURONTIN) 300 MG capsule, Take 1 capsule by mouth 3 (Three) Times a Day As Needed (pain)., Disp: 270 capsule, Rfl: 1  •  glucose blood (ONE TOUCH ULTRA TEST) test strip, USE 1 STRIP TO CHECK GLUCOSE TWICE DAILY, Disp: 100 each, Rfl: 6  •  glucose blood test strip, Use to check blood sugar Twice daily\ E11.9, Disp: 100 each, Rfl: 2  •  glucose monitor monitoring kit, Use to Check Glucose Twice Daily, Disp: 1 each, Rfl: 0  •  irbesartan (AVAPRO) 300 MG tablet, TAKE 1 TABLET EVERY DAY, Disp: 90 tablet, Rfl: 3  •  isosorbide mononitrate (IMDUR) 30 MG 24 hr tablet, Take 30 mg by mouth Daily., Disp: , Rfl: 1  •  levothyroxine (SYNTHROID, LEVOTHROID) 75 MCG tablet, TAKE 1 TABLET EVERY DAY, Disp: 90 tablet, Rfl: 3  •  metFORMIN (GLUCOPHAGE) 1000 MG tablet, TAKE 1 TABLET TWICE DAILY WITH MEALS, Disp: 180 tablet, Rfl: 0  •  metoprolol succinate XL (TOPROL-XL) 25 MG 24 hr tablet, TAKE 1/2 TABLET EVERY DAY, Disp: 45 tablet, Rfl: 2  •  NITROSTAT 0.4 MG SL tablet, DISSOLVE ONE TABLET UNDER THE TONGUE AS NEEDED, Disp: 25 tablet, Rfl: 0  •  nystatin (MYCOSTATIN) 557051 UNIT/ML suspension, Swish and swallow 5 mL 4 (Four) Times a Day., Disp: 200 mL, Rfl: 0  •  pravastatin (PRAVACHOL) 80 MG tablet, TAKE 1 TABLET EVERY DAY, Disp: 90 tablet, Rfl: 3  •  ranolazine (RANEXA) 500 MG 12 hr tablet, TAKE 1 TABLET TWICE DAILY (Patient taking differently: 1 (One) Time.), Disp: 180 tablet, Rfl: 1  •  SITagliptin (Januvia) 100 MG tablet, Take 1 tablet by mouth Daily., Disp: 90 tablet, Rfl: 2  •  meloxicam (Mobic) 15 MG tablet, Take 1 tablet by mouth Daily. Take with food., Disp: 30 tablet, Rfl: 0     Assessment and Plan:      Assessment and Plan {CC  Problem List  Visit Diagnosis  ROS  Review (Popup)  Health Maintenance  Quality  BestPractice  Medications  SmartSets  SnapShot Encounters  Media :23}     Problem List Items Addressed This Visit        Endocrine and Metabolic    Type 2 diabetes mellitus with hyperglycemia, without long-term current use of insulin (HCC) (Chronic)    Current Assessment & Plan     Diabetes is not controlled.     States he has modified diet.    Will recheck today.      Invokana - states he is trying to get from [facility] due to cost.          Relevant Orders    Hemoglobin A1c       Genitourinary and Reproductive     BPH associated with nocturia - Primary (Chronic)    Current Assessment & Plan     Declines flomax today.     Will check PSA     Lab Results   Component Value Date    PSA 1.210 02/15/2021    PSA 1.020 11/05/2019    PSA 0.901 12/11/2018            Relevant Orders    PSA SCREENING   Other Visit Diagnoses     Special screening, prostate cancer        Relevant Orders    PSA SCREENING    Osteoarthritis of spine, unspecified spinal osteoarthritis complication status, unspecified spinal region        Will trial mobic - advised to take with food.           Follow Up {Instructions Charge Capture  Follow-up Communications :23}     Return for Next scheduled follow up.      Patient was given instructions and counseling regarding his condition or for health maintenance advice. Please see specific information pulled into the AVS if appropriate.    Dragon disclaimer:   Much of this encounter note is an electronic transcription/translation of spoken language to printed text. The electronic translation of spoken language may permit erroneous, or at times, nonsensical words or phrases to be inadvertently transcribed; Although I have reviewed the note for such errors, some may still exist.     Additional Patient Counseling:       There are no Patient Instructions on file for this visit.

## 2022-10-19 NOTE — ASSESSMENT & PLAN NOTE
Diabetes is not controlled.     States he has modified diet.    Will recheck today.      Invokana - states he is trying to get from [facility] due to cost.

## 2022-10-19 NOTE — ASSESSMENT & PLAN NOTE
Declines flomax today.     Will check PSA     Lab Results   Component Value Date    PSA 1.210 02/15/2021    PSA 1.020 11/05/2019    PSA 0.901 12/11/2018

## 2022-10-20 LAB
HBA1C MFR BLD: 7.8 % (ref 4.8–5.6)
PSA SERPL-MCNC: 1.51 NG/ML (ref 0–4)

## 2022-11-04 DIAGNOSIS — E03.4 HYPOTHYROIDISM DUE TO ACQUIRED ATROPHY OF THYROID: ICD-10-CM

## 2022-11-04 RX ORDER — LEVOTHYROXINE SODIUM 0.07 MG/1
TABLET ORAL
Qty: 90 TABLET | Refills: 3 | Status: SHIPPED | OUTPATIENT
Start: 2022-11-04

## 2022-11-14 RX ORDER — MELOXICAM 15 MG/1
TABLET ORAL
Qty: 30 TABLET | Refills: 2 | Status: SHIPPED | OUTPATIENT
Start: 2022-11-14 | End: 2023-02-03 | Stop reason: SDUPTHER

## 2022-11-21 DIAGNOSIS — E11.9 DIABETES MELLITUS WITHOUT COMPLICATION: ICD-10-CM

## 2022-12-05 ENCOUNTER — OFFICE VISIT (OUTPATIENT)
Dept: INTERNAL MEDICINE | Facility: CLINIC | Age: 72
End: 2022-12-05

## 2022-12-05 VITALS
SYSTOLIC BLOOD PRESSURE: 122 MMHG | DIASTOLIC BLOOD PRESSURE: 78 MMHG | OXYGEN SATURATION: 98 % | BODY MASS INDEX: 25.68 KG/M2 | HEIGHT: 67 IN | WEIGHT: 163.6 LBS | HEART RATE: 68 BPM | TEMPERATURE: 98.1 F

## 2022-12-05 DIAGNOSIS — I25.10 CORONARY ARTERY DISEASE INVOLVING NATIVE CORONARY ARTERY OF NATIVE HEART WITHOUT ANGINA PECTORIS: Chronic | ICD-10-CM

## 2022-12-05 DIAGNOSIS — E03.4 HYPOTHYROIDISM DUE TO ACQUIRED ATROPHY OF THYROID: Chronic | ICD-10-CM

## 2022-12-05 DIAGNOSIS — I25.2 HISTORY OF MYOCARDIAL INFARCTION: Chronic | ICD-10-CM

## 2022-12-05 DIAGNOSIS — Z00.00 MEDICARE ANNUAL WELLNESS VISIT, SUBSEQUENT: Primary | ICD-10-CM

## 2022-12-05 DIAGNOSIS — I10 ESSENTIAL HYPERTENSION: Chronic | ICD-10-CM

## 2022-12-05 DIAGNOSIS — E11.65 TYPE 2 DIABETES MELLITUS WITH HYPERGLYCEMIA, WITHOUT LONG-TERM CURRENT USE OF INSULIN: Chronic | ICD-10-CM

## 2022-12-05 DIAGNOSIS — I73.9 PVD (PERIPHERAL VASCULAR DISEASE): ICD-10-CM

## 2022-12-05 DIAGNOSIS — M15.9 PRIMARY OSTEOARTHRITIS INVOLVING MULTIPLE JOINTS: Chronic | ICD-10-CM

## 2022-12-05 DIAGNOSIS — G58.9 MONONEUROPATHY: Chronic | ICD-10-CM

## 2022-12-05 DIAGNOSIS — E78.5 HYPERLIPIDEMIA LDL GOAL <70: Chronic | ICD-10-CM

## 2022-12-05 PROBLEM — Z79.899 ENCOUNTER FOR LONG-TERM (CURRENT) DRUG USE: Status: RESOLVED | Noted: 2018-12-18 | Resolved: 2022-12-05

## 2022-12-05 PROBLEM — M19.90 OA (OSTEOARTHRITIS): Status: ACTIVE | Noted: 2022-12-05

## 2022-12-05 PROBLEM — Z12.5 PROSTATE CANCER SCREENING: Status: RESOLVED | Noted: 2018-12-18 | Resolved: 2022-12-05

## 2022-12-05 PROBLEM — M19.90 OA (OSTEOARTHRITIS): Chronic | Status: ACTIVE | Noted: 2022-12-05

## 2022-12-05 PROBLEM — T14.8XXA MUSCLE STRAIN: Status: RESOLVED | Noted: 2019-01-03 | Resolved: 2022-12-05

## 2022-12-05 PROCEDURE — 1126F AMNT PAIN NOTED NONE PRSNT: CPT | Performed by: NURSE PRACTITIONER

## 2022-12-05 PROCEDURE — G0439 PPPS, SUBSEQ VISIT: HCPCS | Performed by: NURSE PRACTITIONER

## 2022-12-05 PROCEDURE — 1159F MED LIST DOCD IN RCRD: CPT | Performed by: NURSE PRACTITIONER

## 2022-12-05 PROCEDURE — 1170F FXNL STATUS ASSESSED: CPT | Performed by: NURSE PRACTITIONER

## 2022-12-05 PROCEDURE — 96160 PT-FOCUSED HLTH RISK ASSMT: CPT | Performed by: NURSE PRACTITIONER

## 2022-12-05 PROCEDURE — 99214 OFFICE O/P EST MOD 30 MIN: CPT | Performed by: NURSE PRACTITIONER

## 2022-12-05 NOTE — PATIENT INSTRUCTIONS
Medicare Wellness  Personal Prevention Plan of Service     Date of Office Visit:    Encounter Provider:  Rigoberto Lowe III, NP-C  Place of Service:  [facility] PRIMARY CARE  Patient Name: Jesus Cuba  :  1950    As part of the Medicare Wellness portion of your visit today, we are providing you with this personalized preventive plan of services (PPPS). This plan is based upon recommendations of the [place] Preventive Services Task Force (USPSTF) and the Advisory Committee on Immunization Practices (ACIP).    This lists the preventive care services that should be considered, and provides dates of when you are due. Items listed as completed are up-to-date and do not require any further intervention.    Health Maintenance   Topic Date Due    ZOSTER VACCINE (1 of 2) Never done    DIABETIC EYE EXAM  2021    COVID-19 Vaccine (3 - Booster for Pfizer series) 10/05/2021    URINE MICROALBUMIN  02/15/2022    LIPID PANEL  2022    INFLUENZA VACCINE  2023 (Originally 2022)    Pneumococcal Vaccine 65+ (1 - PCV) 2023 (Originally 1956)    HEMOGLOBIN A1C  2023    DIABETIC FOOT EXAM  10/19/2023    ANNUAL WELLNESS VISIT  2023    COLORECTAL CANCER SCREENING  08/15/2026    HEPATITIS C SCREENING  Completed    TDAP/TD VACCINES  Discontinued       Orders Placed This Encounter   Procedures    Comprehensive Metabolic Panel     Order Specific Question:   Release to patient     Answer:   Routine Release    Lipid Panel With LDL / HDL Ratio     Order Specific Question:   Release to patient     Answer:   Routine Release    CBC (No Diff)     Order Specific Question:   Release to patient     Answer:   Routine Release    Hemoglobin A1c     Order Specific Question:   Release to patient     Answer:   Routine Release    Microalbumin / Creatinine Urine Ratio - Urine, Clean Catch     Order Specific Question:   Release to patient     Answer:   Routine Release     TSH     Order Specific Question:   Release to patient     Answer:   Routine Release    T4, free     Order Specific Question:   Release to patient     Answer:   Routine Release       Return in about 6 months (around 6/5/2023).    For living will information, please go to this website:     [email or web address].gov/[email or web address]

## 2022-12-05 NOTE — ASSESSMENT & PLAN NOTE
Diabetes is improving with treatment.   Continue current treatment regimen.  Discussed foot care.  Reminded to get yearly retinal exam.  Diabetes will be reassessed in 6 months.

## 2022-12-05 NOTE — PROGRESS NOTES
The ABCs of the Annual Wellness Visit  Subsequent Medicare Wellness Visit    Chief Complaint   Patient presents with   • Medicare Wellness-subsequent   • Diabetes      Subjective    History of Present Illness:  Jesus Cuba is a 72 y.o. male who presents for a Subsequent Medicare Wellness Visit and 6 month follow up  chronic medical conditions including: hypertension, HLD, DMII w PN, CAD (stent to LAD 7/2018), GERD, Anxiety, hypothyroid, PVD (left lower: Dr Escobedo)       Over the summer: A1C up to 9.4%.   He return to clinic on 10/19/22 after aggressively modifying diet: A1C down to 7.8%   He states over the holidays: he is eating more sweets.   Diabetes eye exam done: will request record     Hypertension controlled: no CP, SOA.    CAD: he has not used nitro     Lab Results   Component Value Date    PSA 1.510 10/19/2022    PSA 1.210 02/15/2021    PSA 1.020 11/05/2019     Erectile dysfunction: trimix per Urology.     The following portions of the patient's history were reviewed and   updated as appropriate: allergies, current medications, past family history, past medical history, past social history, past surgical history and problem list.    Compared to one year ago, the patient feels his physical   health is the same.    Compared to one year ago, the patient feels his mental   health is the same.    Recent Hospitalizations:  He was not admitted to the hospital during the last year.       Current Medical Providers:  Patient Care Team:  Rigoberto Lowe III, NP-C as PCP - General (Family Medicine)  Chinyere Sprague MD as Consulting Physician (Cardiology)  Shubham Escobedo MD as Surgeon (Vascular Surgery)    Outpatient Medications Prior to Visit   Medication Sig Dispense Refill   • Accu-Chek Softclix Lancets lancets Use to Check Glucose Twice daily 100 each 2   • Canagliflozin (Invokana) 300 MG tablet tablet Take 1 tablet by mouth Daily. 90 tablet 1   • clopidogrel (PLAVIX) 75 MG tablet Take 1 tablet  by mouth daily. 90 tablet 1   • gabapentin (NEURONTIN) 300 MG capsule Take 1 capsule by mouth 3 (Three) Times a Day As Needed (pain). 270 capsule 1   • glucose blood (ONE TOUCH ULTRA TEST) test strip USE 1 STRIP TO CHECK GLUCOSE TWICE DAILY 100 each 6   • glucose blood test strip Use to check blood sugar Twice daily\ E11.9 100 each 2   • glucose monitor monitoring kit Use to Check Glucose Twice Daily 1 each 0   • irbesartan (AVAPRO) 300 MG tablet TAKE 1 TABLET EVERY DAY 90 tablet 3   • isosorbide mononitrate (IMDUR) 30 MG 24 hr tablet Take 30 mg by mouth Daily.  1   • levothyroxine (SYNTHROID, LEVOTHROID) 75 MCG tablet TAKE 1 TABLET EVERY DAY 90 tablet 3   • meloxicam (MOBIC) 15 MG tablet TAKE 1 TABLET BY MOUTH EVERY DAY WITH FOOD 30 tablet 2   • metFORMIN (GLUCOPHAGE) 1000 MG tablet TAKE 1 TABLET TWICE DAILY WITH MEALS 180 tablet 1   • metoprolol succinate XL (TOPROL-XL) 25 MG 24 hr tablet TAKE 1/2 TABLET EVERY DAY 45 tablet 2   • NITROSTAT 0.4 MG SL tablet DISSOLVE ONE TABLET UNDER THE TONGUE AS NEEDED 25 tablet 0   • nystatin (MYCOSTATIN) 104533 UNIT/ML suspension Swish and swallow 5 mL 4 (Four) Times a Day. 200 mL 0   • pravastatin (PRAVACHOL) 80 MG tablet TAKE 1 TABLET EVERY DAY 90 tablet 3   • ranolazine (RANEXA) 500 MG 12 hr tablet TAKE 1 TABLET TWICE DAILY (Patient taking differently: 1 (One) Time.) 180 tablet 1   • SITagliptin (Januvia) 100 MG tablet Take 1 tablet by mouth Daily. 90 tablet 2     No facility-administered medications prior to visit.       No opioid medication identified on active medication list. I have reviewed chart for other potential  high risk medication/s and harmful drug interactions in the elderly.          Aspirin is not on active medication list.  Not on ASA: on plavix .    Patient Active Problem List   Diagnosis   • Essential hypertension   • Hypothyroidism   • Anxiety   • BPH associated with nocturia   • History of ASCVD   • Nicotine dependence in remission   • Vasculogenic  "erectile dysfunction   • Umbilical hernia   • Type 2 diabetes mellitus with hyperglycemia, without long-term current use of insulin (HCC)   • Hyperlipidemia LDL goal <70   • CAD (coronary artery disease)   • History of coronary artery stent placement   • History of myocardial infarction   • PN (peripheral neuropathy)   • Medication management contract signed   • PVD (peripheral vascular disease) ([facility])   • Vitamin D deficiency   • OA (osteoarthritis)     Advance Care Planning  Advance Directive is not on file.  ACP discussion was held with the patient during this visit. Patient does not have an advance directive, information provided.    Review of Systems   Respiratory: Negative for shortness of breath.    Cardiovascular: Negative for chest pain and leg swelling.        Objective    Vitals:    12/05/22 0754   BP: 122/78   BP Location: Left arm   Patient Position: Sitting   Cuff Size: Adult   Pulse: 68   Temp: 98.1 °F (36.7 °C)   TempSrc: Temporal   SpO2: 98%   Weight: 74.2 kg (163 lb 9.6 oz)   Height: 170.2 cm (67\")   PainSc: 0-No pain     Estimated body mass index is 25.62 kg/m² as calculated from the following:    Height as of this encounter: 170.2 cm (67\").    Weight as of this encounter: 74.2 kg (163 lb 9.6 oz).    BMI is >= 25 and <30. (Overweight) The following options were offered after discussion;: nutrition counseling/recommendations      Does the patient have evidence of cognitive impairment? No    Physical Exam  Vitals reviewed.   Constitutional:       Appearance: Normal appearance. He is well-developed.      Comments: Appears euthyroid    HENT:      Head:      Comments: Wearing mask due to COVID   Neck:      Thyroid: No thyromegaly.   Cardiovascular:      Rate and Rhythm: Normal rate and regular rhythm.      Pulses: Normal pulses.      Heart sounds: Normal heart sounds.   Pulmonary:      Effort: Pulmonary effort is normal.      Breath sounds: Normal breath sounds.      Comments: E/U   Abdominal:      " General: Bowel sounds are normal.      Palpations: Abdomen is soft.   Musculoskeletal:         General: Normal range of motion.      Cervical back: Normal range of motion and neck supple.      Right lower leg: No edema.      Left lower leg: No edema.   Lymphadenopathy:      Cervical: No cervical adenopathy.   Skin:     General: Skin is warm and dry.      Capillary Refill: Capillary refill takes 2 to 3 seconds.   Neurological:      Mental Status: He is alert and oriented to person, place, and time.   Psychiatric:         Mood and Affect: Mood normal.         Behavior: Behavior normal. Behavior is cooperative.         Thought Content: Thought content normal.         Judgment: Judgment normal.       Lab Results   Component Value Date    HGBA1C 7.80 (H) 10/19/2022            HEALTH RISK ASSESSMENT    Smoking Status:  Social History     Tobacco Use   Smoking Status Never   Smokeless Tobacco Never     Alcohol Consumption:  Social History     Substance and Sexual Activity   Alcohol Use Yes   • Alcohol/week: 7.0 standard drinks   • Types: 7 Shots of liquor per week     Fall Risk Screen:    STEADI Fall Risk Assessment was completed, and patient is at LOW risk for falls.Assessment completed on:12/5/2022    Depression Screening:  PHQ-2/PHQ-9 Depression Screening 12/5/2022   Retired PHQ-9 Total Score -   Retired Total Score -   Little Interest or Pleasure in Doing Things 0-->not at all   Feeling Down, Depressed or Hopeless 0-->not at all   PHQ-9: Brief Depression Severity Measure Score 0       Health Habits and Functional and Cognitive Screening:  Functional & Cognitive Status 12/5/2022   Do you have difficulty preparing food and eating? No   Do you have difficulty bathing yourself, getting dressed or grooming yourself? No   Do you have difficulty using the toilet? No   Do you have difficulty moving around from place to place? No   Do you have trouble with steps or getting out of a bed or a chair? No   Current Diet Well  Balanced Diet   Dental Exam Up to date   Eye Exam Up to date   Exercise (times per week) 2 times per week   Current Exercises Include Walking   Current Exercise Activities Include -   Do you need help using the phone?  No   Are you deaf or do you have serious difficulty hearing?  No   Do you need help with transportation? No   Do you need help shopping? No   Do you need help preparing meals?  No   Do you need help with housework?  No   Do you need help with laundry? No   Do you need help taking your medications? No   Do you need help managing money? No   Do you ever drive or ride in a car without wearing a seat belt? No   Have you felt unusual stress, anger or loneliness in the last month? No   Who do you live with? Alone   If you need help, do you have trouble finding someone available to you? No   Have you been bothered in the last four weeks by sexual problems? No   Do you have difficulty concentrating, remembering or making decisions? No       Age-appropriate Screening Schedule:  Refer to the list below for future screening recommendations based on patient's age, sex and/or medical conditions. Orders for these recommended tests are listed in the plan section. The patient has been provided with a written plan.    Health Maintenance   Topic Date Due   • ZOSTER VACCINE (1 of 2) Never done   • DIABETIC EYE EXAM  07/17/2021   • URINE MICROALBUMIN  02/15/2022   • LIPID PANEL  11/22/2022   • INFLUENZA VACCINE  03/31/2023 (Originally 8/1/2022)   • HEMOGLOBIN A1C  04/19/2023   • DIABETIC FOOT EXAM  10/19/2023   • TDAP/TD VACCINES  Discontinued              Assessment & Plan   CMS Preventative Services Quick Reference  Risk Factors Identified During Encounter  Immunizations Discussed/Encouraged (specific Immunizations; Influenza, Pneumococcal 23 and :declined vaccines  The above risks/problems have been discussed with the patient.  Follow up actions/plans if indicated are seen below in the Assessment/Plan  Section.  Pertinent information has been shared with the patient in the After Visit Summary.    Problem List Items Addressed This Visit        Cardiac and Vasculature    History of myocardial infarction (Chronic)    CAD (coronary artery disease) (Chronic)    Overview     Due to the presence of severe small vessel disease and multiple stents: Cardiology recommended stay on DAPT as long as he tolerates.         Current Assessment & Plan     Coronary artery disease is improving with treatment.  Continue current treatment regimen.  Weight loss.  Regular aerobic exercise.  Cardiac status will be reassessed in 6 months.  He has not had to use PRN nitro.          Relevant Orders    Comprehensive Metabolic Panel    Lipid Panel With LDL / HDL Ratio    CBC (No Diff)    Essential hypertension (Chronic)    Current Assessment & Plan     Hypertension is improving with treatment.  Continue current treatment regimen.  Dietary sodium restriction.  Weight loss.  Regular aerobic exercise.  Continue current medications.  Blood pressure will be reassessed at the next regular appointment.         Relevant Orders    Comprehensive Metabolic Panel    Lipid Panel With LDL / HDL Ratio    Hyperlipidemia LDL goal <70 (Chronic)    Current Assessment & Plan     Lipid abnormalities are improving with treatment.  Pharmacotherapy as ordered.  Lipids will be reassessed in 6 months.    Lab Results   Component Value Date    CHLPL 178 11/22/2021    TRIG 378 (H) 11/22/2021    HDL 37 (L) 11/22/2021    LDL 80 11/22/2021         Your triglycerides are elevated. You should cut back on sugar, carbohydrates (including white breads or items made with white flour), and limit alcohol if your currently consume. Increase your exercise level.            Relevant Orders    Comprehensive Metabolic Panel    Lipid Panel With LDL / HDL Ratio    PVD (peripheral vascular disease) (HCC) (Chronic)    Overview     2021: left lower extremity             Endocrine and Metabolic     Hypothyroidism (Chronic)    Current Assessment & Plan     Stable   Check lab for ongoing therapeutic drug monitoring          Relevant Orders    TSH    T4, free    Type 2 diabetes mellitus with hyperglycemia, without long-term current use of insulin (HCC) (Chronic)    Current Assessment & Plan     Diabetes is improving with treatment.   Continue current treatment regimen.  Discussed foot care.  Reminded to get yearly retinal exam.  Diabetes will be reassessed in 6 months.         Relevant Orders    Comprehensive Metabolic Panel    Hemoglobin A1c    Microalbumin / Creatinine Urine Ratio - Urine, Clean Catch       Musculoskeletal and Injuries    OA (osteoarthritis) (Chronic)       Neuro    PN (peripheral neuropathy) (Chronic)    Overview     1/19/21: Started gabapentin          Current Assessment & Plan     Continue gabapentin         Other Visit Diagnoses     Medicare annual wellness visit, subsequent    -  Primary        Patient had AWV visit today.   In addition he has chronic medical conditions with prior abnormal labs and given age and current treatments requires additional E/M to ensure medications are not causing adverse effect and current treatment remains effective.   Diabetes has not been well controlled in the past. States diet has worsened: discussed diet and will recheck A1C.  Triglycerides elevated last year: needs to be recheck.     Lab results will be communicated to patient and updated treatment if necessary.     Follow Up:   Return in about 6 months (around 6/5/2023).     An After Visit Summary and PPPS were made available to the patient.

## 2022-12-05 NOTE — ASSESSMENT & PLAN NOTE
Coronary artery disease is improving with treatment.  Continue current treatment regimen.  Weight loss.  Regular aerobic exercise.  Cardiac status will be reassessed in 6 months.  He has not had to use PRN nitro.

## 2022-12-13 ENCOUNTER — TELEPHONE (OUTPATIENT)
Dept: INTERNAL MEDICINE | Facility: CLINIC | Age: 72
End: 2022-12-13

## 2023-01-20 DIAGNOSIS — E11.65 TYPE 2 DIABETES MELLITUS WITH HYPERGLYCEMIA, WITHOUT LONG-TERM CURRENT USE OF INSULIN: ICD-10-CM

## 2023-01-20 RX ORDER — BLOOD SUGAR DIAGNOSTIC
STRIP MISCELLANEOUS
Qty: 200 EACH | Refills: 3 | Status: SHIPPED | OUTPATIENT
Start: 2023-01-20

## 2023-01-20 NOTE — TELEPHONE ENCOUNTER
Rx Refill Note  Requested Prescriptions     Pending Prescriptions Disp Refills   • Accu-Chek Guide test strip [Pharmacy Med Name: ACCU-CHEK GUIDE   Strip]       Sig: TEST BLOOD SUGAR TWICE DAILY      Last office visit with prescribing clinician: 12/5/2022   Last telemedicine visit with prescribing clinician: 2/6/2023   Next office visit with prescribing clinician: 6/12/2023                         Would you like a call back once the refill request has been completed: [] Yes [] No    If the office needs to give you a call back, can they leave a voicemail: [] Yes [] No    Radha West  01/20/23, 09:56 EST

## 2023-01-23 RX ORDER — RANOLAZINE 500 MG/1
TABLET, EXTENDED RELEASE ORAL
Qty: 180 TABLET | Refills: 1 | Status: SHIPPED | OUTPATIENT
Start: 2023-01-23

## 2023-02-03 RX ORDER — MELOXICAM 15 MG/1
15 TABLET ORAL DAILY
Qty: 30 TABLET | Refills: 2 | Status: SHIPPED | OUTPATIENT
Start: 2023-02-03 | End: 2023-03-20

## 2023-02-03 NOTE — TELEPHONE ENCOUNTER
Caller: [facility] - [facility] [address] - [phone] [facility] [phone] FX    Relationship: Pharmacy      Requested Prescriptions:   Requested Prescriptions     Pending Prescriptions Disp Refills   • meloxicam (MOBIC) 15 MG tablet 30 tablet 2     Sig: Take 1 tablet by mouth Daily. with food        Pharmacy where request should be sent: [facility] - [facility] [address] - [phone] [facility] [phone] FX       Would you like a call back once the refill request has been completed: [] Yes [x] No    If the office needs to give you a call back, can they leave a voicemail: [] Yes [x] No    Lina Eaton Rep   02/03/23 14:28 EST

## 2023-02-05 DIAGNOSIS — G58.9 MONONEUROPATHY: Chronic | ICD-10-CM

## 2023-02-06 RX ORDER — GABAPENTIN 300 MG/1
CAPSULE ORAL
Qty: 270 CAPSULE | Refills: 1 | Status: SHIPPED | OUTPATIENT
Start: 2023-02-06

## 2023-02-06 NOTE — TELEPHONE ENCOUNTER
Rx Refill Note  Requested Prescriptions     Pending Prescriptions Disp Refills   • gabapentin (NEURONTIN) 300 MG capsule [Pharmacy Med Name: GABAPENTIN 300 MG Capsule] 270 capsule      Sig: TAKE 1 CAPSULE THREE TIMES DAILY AS NEEDED FOR PAIN      Last office visit with prescribing clinician: 12/5/2022   Last telemedicine visit with prescribing clinician: 4/10/2023   Next office visit with prescribing clinician: 6/12/2023         Tamika Medina MA  02/06/23, 08:42 EST

## 2023-02-16 ENCOUNTER — TELEPHONE (OUTPATIENT)
Dept: INTERNAL MEDICINE | Facility: CLINIC | Age: 73
End: 2023-02-16

## 2023-02-16 NOTE — TELEPHONE ENCOUNTER
Hub staff attempted to follow warm transfer process and was unsuccessful     Caller: Jesus Cuba    Relationship to patient: Self    Best call back number:[phone]     Patient is needing: PATIENT IS CALLING TO SCHEDULE A LAB APPOINTMENT FOR 2/20/23.    PLEASE CALL PATIENT TO SCHEDULE

## 2023-02-17 RX ORDER — MELOXICAM 15 MG/1
TABLET ORAL
Qty: 90 TABLET | OUTPATIENT
Start: 2023-02-17

## 2023-02-21 LAB
ALBUMIN SERPL-MCNC: 4.3 G/DL (ref 3.7–4.7)
ALBUMIN/CREAT UR: 17 MG/G CREAT (ref 0–29)
ALBUMIN/GLOB SERPL: 1.8 {RATIO} (ref 1.2–2.2)
ALP SERPL-CCNC: 64 IU/L (ref 44–121)
ALT SERPL-CCNC: 22 IU/L (ref 0–44)
AST SERPL-CCNC: 14 IU/L (ref 0–40)
BILIRUB SERPL-MCNC: 0.6 MG/DL (ref 0–1.2)
BUN SERPL-MCNC: 28 MG/DL (ref 8–27)
BUN/CREAT SERPL: 28 (ref 10–24)
CALCIUM SERPL-MCNC: 9.3 MG/DL (ref 8.6–10.2)
CHLORIDE SERPL-SCNC: 103 MMOL/L (ref 96–106)
CHOLEST SERPL-MCNC: 170 MG/DL (ref 100–199)
CO2 SERPL-SCNC: 23 MMOL/L (ref 20–29)
CREAT SERPL-MCNC: 1 MG/DL (ref 0.76–1.27)
CREAT UR-MCNC: 82.2 MG/DL
EGFRCR SERPLBLD CKD-EPI 2021: 80 ML/MIN/1.73
ERYTHROCYTE [DISTWIDTH] IN BLOOD BY AUTOMATED COUNT: 13.7 % (ref 11.6–15.4)
GLOBULIN SER CALC-MCNC: 2.4 G/DL (ref 1.5–4.5)
GLUCOSE SERPL-MCNC: 129 MG/DL (ref 70–99)
HBA1C MFR BLD: 7.8 % (ref 4.8–5.6)
HCT VFR BLD AUTO: 45 % (ref 37.5–51)
HDLC SERPL-MCNC: 42 MG/DL
HGB BLD-MCNC: 15.3 G/DL (ref 13–17.7)
LDLC SERPL CALC-MCNC: 104 MG/DL (ref 0–99)
LDLC/HDLC SERPL: 2.5 RATIO (ref 0–3.6)
MCH RBC QN AUTO: 28.4 PG (ref 26.6–33)
MCHC RBC AUTO-ENTMCNC: 34 G/DL (ref 31.5–35.7)
MCV RBC AUTO: 84 FL (ref 79–97)
MICROALBUMIN UR-MCNC: 13.7 UG/ML
PLATELET # BLD AUTO: 194 X10E3/UL (ref 150–450)
POTASSIUM SERPL-SCNC: 4.5 MMOL/L (ref 3.5–5.2)
PROT SERPL-MCNC: 6.7 G/DL (ref 6–8.5)
RBC # BLD AUTO: 5.39 X10E6/UL (ref 4.14–5.8)
SODIUM SERPL-SCNC: 141 MMOL/L (ref 134–144)
T4 FREE SERPL-MCNC: 1.65 NG/DL (ref 0.82–1.77)
TRIGL SERPL-MCNC: 133 MG/DL (ref 0–149)
TSH SERPL DL<=0.005 MIU/L-ACNC: 2.38 UIU/ML (ref 0.45–4.5)
VLDLC SERPL CALC-MCNC: 24 MG/DL (ref 5–40)
WBC # BLD AUTO: 6.6 X10E3/UL (ref 3.4–10.8)

## 2023-03-01 ENCOUNTER — OFFICE VISIT (OUTPATIENT)
Dept: CARDIOLOGY | Facility: CLINIC | Age: 73
End: 2023-03-01
Payer: MEDICARE

## 2023-03-01 VITALS
DIASTOLIC BLOOD PRESSURE: 88 MMHG | WEIGHT: 170 LBS | SYSTOLIC BLOOD PRESSURE: 150 MMHG | HEART RATE: 69 BPM | HEIGHT: 67 IN | BODY MASS INDEX: 26.68 KG/M2

## 2023-03-01 DIAGNOSIS — I10 ESSENTIAL HYPERTENSION: Chronic | ICD-10-CM

## 2023-03-01 DIAGNOSIS — I25.10 CORONARY ARTERY DISEASE INVOLVING NATIVE CORONARY ARTERY OF NATIVE HEART WITHOUT ANGINA PECTORIS: Primary | Chronic | ICD-10-CM

## 2023-03-01 DIAGNOSIS — E11.65 TYPE 2 DIABETES MELLITUS WITH HYPERGLYCEMIA, WITHOUT LONG-TERM CURRENT USE OF INSULIN: Chronic | ICD-10-CM

## 2023-03-01 PROCEDURE — 93000 ELECTROCARDIOGRAM COMPLETE: CPT | Performed by: INTERNAL MEDICINE

## 2023-03-01 PROCEDURE — 99214 OFFICE O/P EST MOD 30 MIN: CPT | Performed by: INTERNAL MEDICINE

## 2023-03-01 NOTE — PROGRESS NOTES
Date of Office Visit: 23  Encounter Provider: Branden Dey MD  Place of Service: [facility] CARDIOLOGY  Patient Name: Jesus Cuba  :1950  [id number]    Chief Complaint   Patient presents with   • Coronary Artery Disease   :     HPI: Jesus Cuba is a 72 y.o. male this is a gentleman that had a long history of coronary artery disease his first stents were placed in  he has had further stenting in the past I think to his LAD and into his PDA and his last stent was in 2018 with to a diagonal.  He has worked at [employer] in the past.  He has had a history of diabetes which has been marginally controlled he is here for follow-up today says his sugars are in A1c down to 7.8 blood pressures are usually better than what we are getting here today I took it got 144/70.  He is on meloxicam which I asked him to stop and he is not even really sure why he takes it.  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 [employer]    Past Medical History:   Diagnosis Date   • CAD (coronary artery disease)    • Diabetes mellitus (HCC)    • Dyslipidemia    • Hyperlipidemia    • Hypertension    • Hypothyroidism    • Myocardial infarction (HCC)        Past Surgical History:   Procedure Laterality Date   • CARDIAC CATHETERIZATION  2018   • CARDIAC SURGERY     • CORONARY ANGIOPLASTY WITH STENT PLACEMENT      4 stents  over several years - first in    • SKIN BIOPSY      shoulder left       Social History     Socioeconomic History   • Marital status: Single   Tobacco Use   • Smoking status: Never   • Smokeless tobacco: Never   Substance and Sexual Activity   • Alcohol use: Yes     Alcohol/week: 7.0 standard drinks     Types: 7 Shots of liquor per week   • Drug use: Never   • Sexual activity: Yes     Partners: Female       Family History   Problem Relation Age of Onset   • Cancer  Mother         skin   • COPD Mother    • Hyperlipidemia Mother    • Hypertension Mother    • Cancer Father         lung   • Diabetes Father    • Heart disease Father    • Hyperlipidemia Father    • Hypertension Father    • Early death Brother         50   • Alcohol abuse Brother    • Colon cancer Neg Hx    • Colon polyps Neg Hx        Review of Systems   Constitutional: Negative for decreased appetite, fever, malaise/fatigue and weight loss.   HENT: Negative for nosebleeds.    Eyes: Negative for double vision.   Cardiovascular: Negative for chest pain, claudication, cyanosis, dyspnea on exertion, irregular heartbeat, leg swelling, near-syncope, orthopnea, palpitations, paroxysmal nocturnal dyspnea and syncope.   Respiratory: Negative for cough, hemoptysis and shortness of breath.    Hematologic/Lymphatic: Negative for bleeding problem.   Skin: Negative for rash.   Musculoskeletal: Negative for falls and myalgias.   Gastrointestinal: Negative for hematochezia, jaundice, melena, nausea and vomiting.   Genitourinary: Negative for hematuria.   Neurological: Negative for dizziness and seizures.   Psychiatric/Behavioral: Negative for altered mental status and memory loss.       No Known Allergies      Current Outpatient Medications:   •  Accu-Chek Guide test strip, TEST BLOOD SUGAR TWICE DAILY, Disp: 200 each, Rfl: 3  •  Accu-Chek Softclix Lancets lancets, Use to Check Glucose Twice daily, Disp: 100 each, Rfl: 2  •  Canagliflozin (Invokana) 300 MG tablet tablet, Take 1 tablet by mouth Daily., Disp: 90 tablet, Rfl: 1  •  clopidogrel (PLAVIX) 75 MG tablet, Take 1 tablet by mouth daily., Disp: 90 tablet, Rfl: 1  •  gabapentin (NEURONTIN) 300 MG capsule, TAKE 1 CAPSULE THREE TIMES DAILY AS NEEDED FOR PAIN, Disp: 270 capsule, Rfl: 1  •  glucose blood (ONE TOUCH ULTRA TEST) test strip, USE 1 STRIP TO CHECK GLUCOSE TWICE DAILY, Disp: 100 each, Rfl: 6  •  glucose monitor monitoring kit, Use to Check Glucose Twice Daily, Disp: 1  "each, Rfl: 0  •  irbesartan (AVAPRO) 300 MG tablet, TAKE 1 TABLET EVERY DAY, Disp: 90 tablet, Rfl: 3  •  isosorbide mononitrate (IMDUR) 30 MG 24 hr tablet, Take 1 tablet by mouth Daily., Disp: , Rfl: 1  •  levothyroxine (SYNTHROID, LEVOTHROID) 75 MCG tablet, TAKE 1 TABLET EVERY DAY, Disp: 90 tablet, Rfl: 3  •  meloxicam (MOBIC) 15 MG tablet, Take 1 tablet by mouth Daily. with food, Disp: 30 tablet, Rfl: 2  •  metFORMIN (GLUCOPHAGE) 1000 MG tablet, TAKE 1 TABLET TWICE DAILY WITH MEALS, Disp: 180 tablet, Rfl: 1  •  metoprolol succinate XL (TOPROL-XL) 25 MG 24 hr tablet, TAKE 1/2 TABLET EVERY DAY, Disp: 45 tablet, Rfl: 2  •  NITROSTAT 0.4 MG SL tablet, DISSOLVE ONE TABLET UNDER THE TONGUE AS NEEDED, Disp: 25 tablet, Rfl: 0  •  pravastatin (PRAVACHOL) 80 MG tablet, TAKE 1 TABLET EVERY DAY, Disp: 90 tablet, Rfl: 3  •  ranolazine (RANEXA) 500 MG 12 hr tablet, TAKE 1 TABLET TWICE DAILY, Disp: 180 tablet, Rfl: 1  •  SITagliptin (Januvia) 100 MG tablet, Take 1 tablet by mouth Daily., Disp: 90 tablet, Rfl: 2  •  nystatin (MYCOSTATIN) 301514 UNIT/ML suspension, Swish and swallow 5 mL 4 (Four) Times a Day., Disp: 200 mL, Rfl: 0      Objective:     Vitals:    03/01/23 1303   BP: 150/88   Pulse: 69   Weight: 77.1 kg (170 lb)   Height: 170.2 cm (67\")     Body mass index is 26.63 kg/m².    Constitutional:       Appearance: Well-developed.   Eyes:      General: No scleral icterus.  HENT:      Head: Normocephalic.   Neck:      Thyroid: No thyromegaly.      Vascular: No JVD.      Lymphadenopathy: No cervical adenopathy.   Pulmonary:      Effort: Pulmonary effort is normal.      Breath sounds: Normal breath sounds. No wheezing. No rales.   Cardiovascular:      Normal rate. Regular rhythm.      No gallop.   Edema:     Peripheral edema absent.   Abdominal:      Palpations: Abdomen is soft.      Tenderness: There is no abdominal tenderness.   Musculoskeletal: Normal range of motion. Skin:     General: Skin is warm and dry.      Findings: " No rash.   Neurological:      Mental Status: Alert and oriented to person, place, and time.           ECG 12 Lead    Date/Time: 3/1/2023 1:15 PM  Performed by: Branden Dey MD  Authorized by: Branden Dey MD   Comparison: compared with previous ECG   Similar to previous ECG  Rhythm: sinus rhythm  Other findings: non-specific ST-T wave changes    Clinical impression: abnormal EKG  Comments: T wave inve are new               Assessment:       Diagnosis Plan   1. Coronary artery disease involving native coronary artery of native heart without angina pectoris        2. Type 2 diabetes mellitus with hyperglycemia, without long-term current use of insulin ([facility])               Plan:       Symptomatically he is doing pretty well I got his blood pressure little bit better than what we did when he first arrived I would like him to watch his blood pressure stop the meloxicam and see if it comes down and then if it does not I would add a little bit of amlodipine to his regimen.  He is got some kind of new diffuse T wave inversions I have a little bit of nonspecific T waves inferiorly and laterally before with no symptoms I am not can to make any changes with that we will just cannot watch it I will have him come back and see me in 6 months sooner if he has trouble    Return for See Ale Espinoza in 1 year, See me in 2 years.     As always, it has been a pleasure to participate in your patient's care.      Sincerely,       Branden Dey MD

## 2023-03-13 DIAGNOSIS — E11.65 TYPE 2 DIABETES MELLITUS WITH HYPERGLYCEMIA, WITHOUT LONG-TERM CURRENT USE OF INSULIN: Primary | Chronic | ICD-10-CM

## 2023-03-13 NOTE — TELEPHONE ENCOUNTER
Rx Refill Note  Requested Prescriptions      No prescriptions requested or ordered in this encounter      Last office visit with prescribing clinician: 12/5/2022   Last telemedicine visit with prescribing clinician: 4/10/2023   Next office visit with prescribing clinician: 6/12/2023                         Would you like a call back once the refill request has been completed: [] Yes [] No    If the office needs to give you a call back, can they leave a voicemail: [] Yes [] No    Radha West  03/13/23, 09:19 EDT

## 2023-03-20 ENCOUNTER — OFFICE VISIT (OUTPATIENT)
Dept: INTERNAL MEDICINE | Facility: CLINIC | Age: 73
End: 2023-03-20
Payer: MEDICARE

## 2023-03-20 VITALS
OXYGEN SATURATION: 98 % | HEIGHT: 67 IN | DIASTOLIC BLOOD PRESSURE: 80 MMHG | TEMPERATURE: 97.7 F | WEIGHT: 167 LBS | HEART RATE: 79 BPM | SYSTOLIC BLOOD PRESSURE: 140 MMHG | BODY MASS INDEX: 26.21 KG/M2

## 2023-03-20 DIAGNOSIS — I10 ESSENTIAL HYPERTENSION: Chronic | ICD-10-CM

## 2023-03-20 DIAGNOSIS — M15.9 PRIMARY OSTEOARTHRITIS INVOLVING MULTIPLE JOINTS: Primary | Chronic | ICD-10-CM

## 2023-03-20 DIAGNOSIS — F17.211 CIGARETTE NICOTINE DEPENDENCE IN REMISSION: ICD-10-CM

## 2023-03-20 PROCEDURE — 3051F HG A1C>EQUAL 7.0%<8.0%: CPT | Performed by: NURSE PRACTITIONER

## 2023-03-20 PROCEDURE — 99214 OFFICE O/P EST MOD 30 MIN: CPT | Performed by: NURSE PRACTITIONER

## 2023-03-20 PROCEDURE — 3079F DIAST BP 80-89 MM HG: CPT | Performed by: NURSE PRACTITIONER

## 2023-03-20 PROCEDURE — 3077F SYST BP >= 140 MM HG: CPT | Performed by: NURSE PRACTITIONER

## 2023-03-20 PROCEDURE — 1159F MED LIST DOCD IN RCRD: CPT | Performed by: NURSE PRACTITIONER

## 2023-03-20 PROCEDURE — 1160F RVW MEDS BY RX/DR IN RCRD: CPT | Performed by: NURSE PRACTITIONER

## 2023-03-20 RX ORDER — CELECOXIB 200 MG/1
200 CAPSULE ORAL DAILY
Qty: 30 CAPSULE | Refills: 5 | Status: SHIPPED | OUTPATIENT
Start: 2023-03-20

## 2023-03-20 NOTE — PROGRESS NOTES
Chief Complaint  Shoulder Pain and Back Pain     Subjective:      History of Present Illness {CC  Problem List  Visit  Diagnosis   Encounters  Notes  Medications  Labs  Result Review Imaging  Media :23}     Jesus Cuba presents to [facility] for:      He chronically has hypertension, CAD, hyperlipidemia, OA      OA: chronic: multiple joints (shoulders, back)   Dr Valdes had had him to stop taking Mobic because he wasn't sure why he was taking it.  Now OA worse.   He took celbrex in the past and was effective.     Hypertension: he was only taking one half tab toprol.  Now taking full tab.     Hx smoking: due for screen.   No SOA, CP   Last low dose CT Screen: 12/6/2017      Discussed shingles vaccine: he will get at local pharmacy.  Varicella titer positive       I have reviewed patient's medical history, any new submitted information provided by patient or medical assistant and updated medical record.      Objective:      Physical Exam  Vitals reviewed.   Constitutional:       Appearance: Normal appearance. He is well-developed.   HENT:      Head:      Comments: Wearing mask due to COVID   Neck:      Thyroid: No thyromegaly.   Cardiovascular:      Rate and Rhythm: Normal rate and regular rhythm.      Pulses: Normal pulses.      Heart sounds: Normal heart sounds.   Pulmonary:      Effort: Pulmonary effort is normal.      Breath sounds: Normal breath sounds. No wheezing.      Comments: E/U   Lymphadenopathy:      Cervical: No cervical adenopathy.   Neurological:      Mental Status: He is alert and oriented to person, place, and time.   Psychiatric:         Mood and Affect: Mood normal.         Behavior: Behavior normal. Behavior is cooperative.         Thought Content: Thought content normal.         Judgment: Judgment normal.        Result Review  Data Reviewed:{ Labs  Result Review  Imaging  Med Tab  Media :23}     The following data was reviewed by:  "Rigoberto Lowe, JOEY, NP-C on 03/20/2023  Common labs    Common Labs 5/23/22 5/23/22 10/19/22 10/19/22 2/20/23 2/20/23 2/20/23 2/20/23 2/20/23    0849 0849 1117 1117 1009 1009 1009 1009 1009   Glucose 188 (A)    129 (A)       BUN 23    28 (A)       Creatinine 1.08    1.00       Sodium 139    141       Potassium 4.4    4.5       Chloride 102    103       Calcium 9.3    9.3       Total Protein     6.7       Albumin     4.3       Total Bilirubin     0.6       Alkaline Phosphatase     64       AST (SGOT)     14       ALT (SGPT)     22       WBC       6.6     Hemoglobin       15.3     Hematocrit       45.0     Platelets       194     Total Cholesterol      170      Triglycerides      133      HDL Cholesterol      42      LDL Cholesterol       104 (A)      Hemoglobin A1C  9.4 (A)  7.80 (A)    7.8 (A)    Microalbumin, Urine         13.7   PSA   1.510         (A) Abnormal value       Comments are available for some flowsheets but are not being displayed.                  Vital Signs:   /80 (BP Location: Left arm, Patient Position: Sitting, Cuff Size: Adult)   Pulse 79   Temp 97.7 °F (36.5 °C) (Temporal)   Ht 170.2 cm (67\")   Wt 75.8 kg (167 lb)   SpO2 98%   BMI 26.16 kg/m²         Requested Prescriptions     Signed Prescriptions Disp Refills   • celecoxib (CeleBREX) 200 MG capsule 30 capsule 5     Sig: Take 1 capsule by mouth Daily.       Routine medications provided by this office will also be refilled via pharmacy request.       Current Outpatient Medications:   •  Accu-Chek Guide test strip, TEST BLOOD SUGAR TWICE DAILY, Disp: 200 each, Rfl: 3  •  Accu-Chek Softclix Lancets lancets, Use to Check Glucose Twice daily, Disp: 100 each, Rfl: 2  •  Canagliflozin (Invokana) 300 MG tablet tablet, Take 1 tablet by mouth Daily., Disp: 90 tablet, Rfl: 1  •  clopidogrel (PLAVIX) 75 MG tablet, Take 1 tablet by mouth daily., Disp: 90 tablet, Rfl: 1  •  gabapentin (NEURONTIN) 300 MG capsule, TAKE 1 CAPSULE THREE " TIMES DAILY AS NEEDED FOR PAIN, Disp: 270 capsule, Rfl: 1  •  glucose blood (ONE TOUCH ULTRA TEST) test strip, USE 1 STRIP TO CHECK GLUCOSE TWICE DAILY, Disp: 100 each, Rfl: 6  •  glucose monitor monitoring kit, Use to Check Glucose Twice Daily, Disp: 1 each, Rfl: 0  •  irbesartan (AVAPRO) 300 MG tablet, TAKE 1 TABLET EVERY DAY, Disp: 90 tablet, Rfl: 3  •  isosorbide mononitrate (IMDUR) 30 MG 24 hr tablet, Take 1 tablet by mouth Daily., Disp: , Rfl: 1  •  levothyroxine (SYNTHROID, LEVOTHROID) 75 MCG tablet, TAKE 1 TABLET EVERY DAY, Disp: 90 tablet, Rfl: 3  •  metFORMIN (GLUCOPHAGE) 1000 MG tablet, TAKE 1 TABLET TWICE DAILY WITH MEALS, Disp: 180 tablet, Rfl: 1  •  metoprolol succinate XL (TOPROL-XL) 25 MG 24 hr tablet, TAKE 1/2 TABLET EVERY DAY, Disp: 45 tablet, Rfl: 2  •  NITROSTAT 0.4 MG SL tablet, DISSOLVE ONE TABLET UNDER THE TONGUE AS NEEDED, Disp: 25 tablet, Rfl: 0  •  pravastatin (PRAVACHOL) 80 MG tablet, TAKE 1 TABLET EVERY DAY, Disp: 90 tablet, Rfl: 3  •  ranolazine (RANEXA) 500 MG 12 hr tablet, TAKE 1 TABLET TWICE DAILY, Disp: 180 tablet, Rfl: 1  •  SITagliptin (Januvia) 100 MG tablet, Take 1 tablet by mouth Daily., Disp: 90 tablet, Rfl: 2  •  celecoxib (CeleBREX) 200 MG capsule, Take 1 capsule by mouth Daily., Disp: 30 capsule, Rfl: 5     Assessment and Plan:      Assessment and Plan {CC Problem List  Visit Diagnosis  ROS  Review (Popup)  Health Maintenance  Quality  BestPractice  Medications  SmartSets  SnapShot Encounters  Media :23}     Problem List Items Addressed This Visit        Cardiac and Vasculature    Essential hypertension (Chronic)    Current Assessment & Plan     Hypertension is improving with treatment.  Continue current treatment regimen.  Dietary sodium restriction.  Weight loss.  Regular aerobic exercise.  Continue current medications.  Blood pressure will be reassessed at the next regular appointment.            Musculoskeletal and Injuries    OA (osteoarthritis) - Primary  (Chronic)    Current Assessment & Plan     I will send in celebrex - but he will call cardiology to verify and get ok'd to take.          Relevant Medications    celecoxib (CeleBREX) 200 MG capsule       Tobacco    Nicotine dependence in remission    Relevant Orders    CT Chest Low Dose Wo       Follow Up {Instructions Charge Capture  Follow-up Communications :23}     Return in about 6 months (around 9/20/2023) for Medicare Wellness.      Patient was given instructions and counseling regarding his condition or for health maintenance advice. Please see specific information pulled into the AVS if appropriate.    Maria T disclaimer:   Much of this encounter note is an electronic transcription/translation of spoken language to printed text. The electronic translation of spoken language may permit erroneous, or at times, nonsensical words or phrases to be inadvertently transcribed; Although I have reviewed the note for such errors, some may still exist.     Additional Patient Counseling:       There are no Patient Instructions on file for this visit.

## 2023-04-10 NOTE — ASSESSMENT & PLAN NOTE
Diabetes is unchanged.   Continue current treatment regimen.  Discussed foot care.  Reminded to get yearly retinal exam.  Diabetes will be reassessed in 6 months.    Your last A1C (3 month average) =   Lab Results   Component Value Date    HGBA1C 8.20 (H) 02/15/2021      Your diabetes is Uncontrolled.    Eye Health:   You need a diabetic eye exam yearly.   Please have a copy of the note faxed to my office.   Fax: [phone]    Foot Health:   You need a diabetic foot exam yearly.   Check your feet routinely for any wounds.   You should always check your shoes for any debris that could cause a wound.       DECLINES labs today.    [Follow-Up Visit] : a follow-up visit for [Back Pain] : back pain [FreeTextEntry2] : Hip pain

## 2023-05-09 ENCOUNTER — TELEPHONE (OUTPATIENT)
Dept: INTERNAL MEDICINE | Facility: CLINIC | Age: 73
End: 2023-05-09
Payer: MEDICARE

## 2023-05-09 NOTE — TELEPHONE ENCOUNTER
Pt called stating he has SOB after walking and like doing activities like mowing the lawn even sometime after he eats pt states his head starts to hurt sometimes when it happens. Pt had an apt with APRN 5/10/23  But wanted to cancel it and just play it by ear he has a lung screening tomorrow advised pt to go to ER if his symptoms get any worse pt did agree

## 2023-05-10 ENCOUNTER — HOSPITAL ENCOUNTER (OUTPATIENT)
Dept: CT IMAGING | Facility: HOSPITAL | Age: 73
Discharge: HOME | End: 2023-05-10
Admitting: NURSE PRACTITIONER
Payer: MEDICARE

## 2023-05-10 DIAGNOSIS — F17.211 CIGARETTE NICOTINE DEPENDENCE IN REMISSION: ICD-10-CM

## 2023-05-10 PROCEDURE — 71271 CT THORAX LUNG CANCER SCR C-: CPT

## 2023-05-11 ENCOUNTER — HOSPITAL ENCOUNTER (OUTPATIENT)
Facility: HOSPITAL | Age: 73
Discharge: HOME | End: 2023-05-12
Attending: EMERGENCY MEDICINE | Admitting: INTERNAL MEDICINE
Payer: MEDICARE

## 2023-05-11 ENCOUNTER — APPOINTMENT (OUTPATIENT)
Dept: GENERAL RADIOLOGY | Facility: HOSPITAL | Age: 73
End: 2023-05-11
Payer: MEDICARE

## 2023-05-11 DIAGNOSIS — R06.09 EXERTIONAL DYSPNEA: Primary | ICD-10-CM

## 2023-05-11 DIAGNOSIS — I25.10 CAD, MULTIPLE VESSEL: ICD-10-CM

## 2023-05-11 DIAGNOSIS — Z95.5 STATUS POST INSERTION OF DRUG-ELUTING STENT INTO LEFT ANTERIOR DESCENDING (LAD) ARTERY FOR CORONARY ARTERY DISEASE: ICD-10-CM

## 2023-05-11 DIAGNOSIS — Z86.79 HISTORY OF CAD (CORONARY ARTERY DISEASE): ICD-10-CM

## 2023-05-11 DIAGNOSIS — E11.65 POORLY CONTROLLED DIABETES MELLITUS: ICD-10-CM

## 2023-05-11 LAB
ALBUMIN SERPL-MCNC: 4.2 G/DL (ref 3.5–5.2)
ALBUMIN/GLOB SERPL: 1.9 G/DL
ALP SERPL-CCNC: 71 U/L (ref 39–117)
ALT SERPL W P-5'-P-CCNC: 17 U/L (ref 1–41)
ANION GAP SERPL CALCULATED.3IONS-SCNC: 10 MMOL/L (ref 5–15)
AST SERPL-CCNC: 12 U/L (ref 1–40)
BASOPHILS # BLD AUTO: 0.06 10*3/MM3 (ref 0–0.2)
BASOPHILS NFR BLD AUTO: 1 % (ref 0–1.5)
BILIRUB SERPL-MCNC: 0.4 MG/DL (ref 0–1.2)
BUN SERPL-MCNC: 24 MG/DL (ref 8–23)
BUN/CREAT SERPL: 20.3 (ref 7–25)
CALCIUM SPEC-SCNC: 9 MG/DL (ref 8.6–10.5)
CHLORIDE SERPL-SCNC: 106 MMOL/L (ref 98–107)
CO2 SERPL-SCNC: 23 MMOL/L (ref 22–29)
CREAT SERPL-MCNC: 1.18 MG/DL (ref 0.76–1.27)
DEPRECATED RDW RBC AUTO: 44 FL (ref 37–54)
EGFRCR SERPLBLD CKD-EPI 2021: 65.6 ML/MIN/1.73
EOSINOPHIL # BLD AUTO: 0.25 10*3/MM3 (ref 0–0.4)
EOSINOPHIL NFR BLD AUTO: 4.2 % (ref 0.3–6.2)
ERYTHROCYTE [DISTWIDTH] IN BLOOD BY AUTOMATED COUNT: 13.9 % (ref 12.3–15.4)
GEN 5 2HR TROPONIN T REFLEX: 103 NG/L
GLOBULIN UR ELPH-MCNC: 2.2 GM/DL
GLUCOSE BLDC GLUCOMTR-MCNC: 206 MG/DL (ref 70–130)
GLUCOSE BLDC GLUCOMTR-MCNC: 77 MG/DL (ref 70–130)
GLUCOSE SERPL-MCNC: 214 MG/DL (ref 65–99)
HCT VFR BLD AUTO: 41.1 % (ref 37.5–51)
HGB BLD-MCNC: 13.5 G/DL (ref 13–17.7)
IMM GRANULOCYTES # BLD AUTO: 0.01 10*3/MM3 (ref 0–0.05)
IMM GRANULOCYTES NFR BLD AUTO: 0.2 % (ref 0–0.5)
LYMPHOCYTES # BLD AUTO: 2.03 10*3/MM3 (ref 0.7–3.1)
LYMPHOCYTES NFR BLD AUTO: 34.2 % (ref 19.6–45.3)
MCH RBC QN AUTO: 28.4 PG (ref 26.6–33)
MCHC RBC AUTO-ENTMCNC: 32.8 G/DL (ref 31.5–35.7)
MCV RBC AUTO: 86.3 FL (ref 79–97)
MONOCYTES # BLD AUTO: 0.67 10*3/MM3 (ref 0.1–0.9)
MONOCYTES NFR BLD AUTO: 11.3 % (ref 5–12)
NEUTROPHILS NFR BLD AUTO: 2.91 10*3/MM3 (ref 1.7–7)
NEUTROPHILS NFR BLD AUTO: 49.1 % (ref 42.7–76)
NRBC BLD AUTO-RTO: 0 /100 WBC (ref 0–0.2)
PLATELET # BLD AUTO: 189 10*3/MM3 (ref 140–450)
PMV BLD AUTO: 10.2 FL (ref 6–12)
POTASSIUM SERPL-SCNC: 3.9 MMOL/L (ref 3.5–5.2)
PROT SERPL-MCNC: 6.4 G/DL (ref 6–8.5)
RBC # BLD AUTO: 4.76 10*6/MM3 (ref 4.14–5.8)
SODIUM SERPL-SCNC: 139 MMOL/L (ref 136–145)
TROPONIN T DELTA: 1 NG/L
TROPONIN T SERPL HS-MCNC: 102 NG/L
WBC NRBC COR # BLD: 5.93 10*3/MM3 (ref 3.4–10.8)

## 2023-05-11 PROCEDURE — 96360 HYDRATION IV INFUSION INIT: CPT

## 2023-05-11 PROCEDURE — C1725 CATH, TRANSLUMIN NON-LASER: HCPCS | Performed by: STUDENT IN AN ORGANIZED HEALTH CARE EDUCATION/TRAINING PROGRAM

## 2023-05-11 PROCEDURE — C1874 STENT, COATED/COV W/DEL SYS: HCPCS | Performed by: STUDENT IN AN ORGANIZED HEALTH CARE EDUCATION/TRAINING PROGRAM

## 2023-05-11 PROCEDURE — 82948 REAGENT STRIP/BLOOD GLUCOSE: CPT

## 2023-05-11 PROCEDURE — 92921 PR PRQ TRLUML CORONARY ANGIOPLASTY ADDL BRANCH: CPT | Performed by: STUDENT IN AN ORGANIZED HEALTH CARE EDUCATION/TRAINING PROGRAM

## 2023-05-11 PROCEDURE — C1753 CATH, INTRAVAS ULTRASOUND: HCPCS | Performed by: STUDENT IN AN ORGANIZED HEALTH CARE EDUCATION/TRAINING PROGRAM

## 2023-05-11 PROCEDURE — 92978 ENDOLUMINL IVUS OCT C 1ST: CPT | Performed by: STUDENT IN AN ORGANIZED HEALTH CARE EDUCATION/TRAINING PROGRAM

## 2023-05-11 PROCEDURE — 25010000002 FENTANYL CITRATE (PF) 50 MCG/ML SOLUTION: Performed by: STUDENT IN AN ORGANIZED HEALTH CARE EDUCATION/TRAINING PROGRAM

## 2023-05-11 PROCEDURE — 25510000001 IOPAMIDOL PER 1 ML: Performed by: STUDENT IN AN ORGANIZED HEALTH CARE EDUCATION/TRAINING PROGRAM

## 2023-05-11 PROCEDURE — C1894 INTRO/SHEATH, NON-LASER: HCPCS | Performed by: STUDENT IN AN ORGANIZED HEALTH CARE EDUCATION/TRAINING PROGRAM

## 2023-05-11 PROCEDURE — 80053 COMPREHEN METABOLIC PANEL: CPT | Performed by: PHYSICIAN ASSISTANT

## 2023-05-11 PROCEDURE — C9600 PERC DRUG-EL COR STENT SING: HCPCS | Performed by: STUDENT IN AN ORGANIZED HEALTH CARE EDUCATION/TRAINING PROGRAM

## 2023-05-11 PROCEDURE — G0378 HOSPITAL OBSERVATION PER HR: HCPCS

## 2023-05-11 PROCEDURE — 92921: CPT | Performed by: STUDENT IN AN ORGANIZED HEALTH CARE EDUCATION/TRAINING PROGRAM

## 2023-05-11 PROCEDURE — 93458 L HRT ARTERY/VENTRICLE ANGIO: CPT | Performed by: STUDENT IN AN ORGANIZED HEALTH CARE EDUCATION/TRAINING PROGRAM

## 2023-05-11 PROCEDURE — C1887 CATHETER, GUIDING: HCPCS | Performed by: STUDENT IN AN ORGANIZED HEALTH CARE EDUCATION/TRAINING PROGRAM

## 2023-05-11 PROCEDURE — 25010000002 MIDAZOLAM PER 1 MG: Performed by: STUDENT IN AN ORGANIZED HEALTH CARE EDUCATION/TRAINING PROGRAM

## 2023-05-11 PROCEDURE — 93005 ELECTROCARDIOGRAM TRACING: CPT | Performed by: EMERGENCY MEDICINE

## 2023-05-11 PROCEDURE — 85347 COAGULATION TIME ACTIVATED: CPT

## 2023-05-11 PROCEDURE — 93005 ELECTROCARDIOGRAM TRACING: CPT

## 2023-05-11 PROCEDURE — C1769 GUIDE WIRE: HCPCS | Performed by: STUDENT IN AN ORGANIZED HEALTH CARE EDUCATION/TRAINING PROGRAM

## 2023-05-11 PROCEDURE — 99223 1ST HOSP IP/OBS HIGH 75: CPT | Performed by: INTERNAL MEDICINE

## 2023-05-11 PROCEDURE — 25010000002 HEPARIN (PORCINE) PER 1000 UNITS: Performed by: STUDENT IN AN ORGANIZED HEALTH CARE EDUCATION/TRAINING PROGRAM

## 2023-05-11 PROCEDURE — 84484 ASSAY OF TROPONIN QUANT: CPT | Performed by: PHYSICIAN ASSISTANT

## 2023-05-11 PROCEDURE — 93010 ELECTROCARDIOGRAM REPORT: CPT | Performed by: INTERNAL MEDICINE

## 2023-05-11 PROCEDURE — 71045 X-RAY EXAM CHEST 1 VIEW: CPT

## 2023-05-11 PROCEDURE — 85025 COMPLETE CBC W/AUTO DIFF WBC: CPT | Performed by: PHYSICIAN ASSISTANT

## 2023-05-11 PROCEDURE — 99284 EMERGENCY DEPT VISIT MOD MDM: CPT

## 2023-05-11 PROCEDURE — 92928 PRQ TCAT PLMT NTRAC ST 1 LES: CPT | Performed by: STUDENT IN AN ORGANIZED HEALTH CARE EDUCATION/TRAINING PROGRAM

## 2023-05-11 DEVICE — XIENCE SKYPOINT™ EVEROLIMUS ELUTING CORONARY STENT SYSTEM 3.00 MM X 15 MM / RAPID-EXCHANGE
Type: IMPLANTABLE DEVICE | Site: HEART | Status: FUNCTIONAL
Brand: XIENCE SKYPOINT™

## 2023-05-11 RX ORDER — METOPROLOL SUCCINATE 25 MG/1
12.5 TABLET, EXTENDED RELEASE ORAL DAILY
Status: DISCONTINUED | OUTPATIENT
Start: 2023-05-11 | End: 2023-05-12 | Stop reason: HOSPADM

## 2023-05-11 RX ORDER — LEVOTHYROXINE SODIUM 0.07 MG/1
75 TABLET ORAL
Status: DISCONTINUED | OUTPATIENT
Start: 2023-05-12 | End: 2023-05-12 | Stop reason: HOSPADM

## 2023-05-11 RX ORDER — FENTANYL CITRATE 50 UG/ML
INJECTION, SOLUTION INTRAMUSCULAR; INTRAVENOUS
Status: DISCONTINUED | OUTPATIENT
Start: 2023-05-11 | End: 2023-05-11 | Stop reason: HOSPADM

## 2023-05-11 RX ORDER — DEXTROSE MONOHYDRATE 25 G/50ML
25 INJECTION, SOLUTION INTRAVENOUS
Status: DISCONTINUED | OUTPATIENT
Start: 2023-05-11 | End: 2023-05-12 | Stop reason: HOSPADM

## 2023-05-11 RX ORDER — NITROGLYCERIN 0.4 MG/1
0.4 TABLET SUBLINGUAL
Status: DISCONTINUED | OUTPATIENT
Start: 2023-05-11 | End: 2023-05-12 | Stop reason: HOSPADM

## 2023-05-11 RX ORDER — VERAPAMIL HYDROCHLORIDE 2.5 MG/ML
INJECTION, SOLUTION INTRAVENOUS
Status: DISCONTINUED | OUTPATIENT
Start: 2023-05-11 | End: 2023-05-11 | Stop reason: HOSPADM

## 2023-05-11 RX ORDER — GABAPENTIN 300 MG/1
300 CAPSULE ORAL 3 TIMES DAILY
Status: DISCONTINUED | OUTPATIENT
Start: 2023-05-11 | End: 2023-05-12

## 2023-05-11 RX ORDER — LOSARTAN POTASSIUM 100 MG/1
100 TABLET ORAL
Status: DISCONTINUED | OUTPATIENT
Start: 2023-05-12 | End: 2023-05-12 | Stop reason: HOSPADM

## 2023-05-11 RX ORDER — SODIUM CHLORIDE 9 MG/ML
40 INJECTION, SOLUTION INTRAVENOUS AS NEEDED
Status: DISCONTINUED | OUTPATIENT
Start: 2023-05-11 | End: 2023-05-12 | Stop reason: HOSPADM

## 2023-05-11 RX ORDER — ACETAMINOPHEN 325 MG/1
650 TABLET ORAL EVERY 6 HOURS PRN
Status: DISCONTINUED | OUTPATIENT
Start: 2023-05-11 | End: 2023-05-11 | Stop reason: SDUPTHER

## 2023-05-11 RX ORDER — SODIUM CHLORIDE 9 MG/ML
INJECTION, SOLUTION INTRAVENOUS
Status: COMPLETED | OUTPATIENT
Start: 2023-05-11 | End: 2023-05-11

## 2023-05-11 RX ORDER — ASPIRIN 81 MG/1
324 TABLET, CHEWABLE ORAL ONCE
Status: COMPLETED | OUTPATIENT
Start: 2023-05-11 | End: 2023-05-11

## 2023-05-11 RX ORDER — INSULIN LISPRO 100 [IU]/ML
2-9 INJECTION, SOLUTION INTRAVENOUS; SUBCUTANEOUS
Status: DISCONTINUED | OUTPATIENT
Start: 2023-05-11 | End: 2023-05-12 | Stop reason: HOSPADM

## 2023-05-11 RX ORDER — SODIUM CHLORIDE 9 MG/ML
100 INJECTION, SOLUTION INTRAVENOUS CONTINUOUS
Status: DISCONTINUED | OUTPATIENT
Start: 2023-05-11 | End: 2023-05-12 | Stop reason: HOSPADM

## 2023-05-11 RX ORDER — SODIUM CHLORIDE 0.9 % (FLUSH) 0.9 %
10 SYRINGE (ML) INJECTION AS NEEDED
Status: DISCONTINUED | OUTPATIENT
Start: 2023-05-11 | End: 2023-05-12 | Stop reason: HOSPADM

## 2023-05-11 RX ORDER — CLOPIDOGREL BISULFATE 75 MG/1
75 TABLET ORAL DAILY
Status: DISCONTINUED | OUTPATIENT
Start: 2023-05-12 | End: 2023-05-12 | Stop reason: HOSPADM

## 2023-05-11 RX ORDER — HEPARIN SODIUM 1000 [USP'U]/ML
INJECTION, SOLUTION INTRAVENOUS; SUBCUTANEOUS
Status: DISCONTINUED | OUTPATIENT
Start: 2023-05-11 | End: 2023-05-11 | Stop reason: HOSPADM

## 2023-05-11 RX ORDER — LIDOCAINE HYDROCHLORIDE 20 MG/ML
INJECTION, SOLUTION INFILTRATION; PERINEURAL
Status: DISCONTINUED | OUTPATIENT
Start: 2023-05-11 | End: 2023-05-11 | Stop reason: HOSPADM

## 2023-05-11 RX ORDER — RANOLAZINE 500 MG/1
500 TABLET, EXTENDED RELEASE ORAL 2 TIMES DAILY
Status: DISCONTINUED | OUTPATIENT
Start: 2023-05-11 | End: 2023-05-12 | Stop reason: HOSPADM

## 2023-05-11 RX ORDER — MELOXICAM 15 MG/1
15 TABLET ORAL DAILY
Status: ON HOLD | COMMUNITY
End: 2023-05-15

## 2023-05-11 RX ORDER — IBUPROFEN 600 MG/1
1 TABLET ORAL
Status: DISCONTINUED | OUTPATIENT
Start: 2023-05-11 | End: 2023-05-12 | Stop reason: HOSPADM

## 2023-05-11 RX ORDER — PRAVASTATIN SODIUM 40 MG
80 TABLET ORAL DAILY
Status: DISCONTINUED | OUTPATIENT
Start: 2023-05-11 | End: 2023-05-12

## 2023-05-11 RX ORDER — HYDROMORPHONE HYDROCHLORIDE 1 MG/ML
0.5 INJECTION, SOLUTION INTRAMUSCULAR; INTRAVENOUS; SUBCUTANEOUS
Status: DISCONTINUED | OUTPATIENT
Start: 2023-05-11 | End: 2023-05-12 | Stop reason: HOSPADM

## 2023-05-11 RX ORDER — ONDANSETRON 2 MG/ML
4 INJECTION INTRAMUSCULAR; INTRAVENOUS EVERY 6 HOURS PRN
Status: DISCONTINUED | OUTPATIENT
Start: 2023-05-11 | End: 2023-05-12 | Stop reason: HOSPADM

## 2023-05-11 RX ORDER — SODIUM CHLORIDE 0.9 % (FLUSH) 0.9 %
10 SYRINGE (ML) INJECTION EVERY 12 HOURS SCHEDULED
Status: DISCONTINUED | OUTPATIENT
Start: 2023-05-11 | End: 2023-05-12 | Stop reason: HOSPADM

## 2023-05-11 RX ORDER — ASPIRIN 81 MG/1
81 TABLET ORAL DAILY
Status: DISCONTINUED | OUTPATIENT
Start: 2023-05-12 | End: 2023-05-12 | Stop reason: HOSPADM

## 2023-05-11 RX ORDER — NICARDIPINE HYDROCHLORIDE 2.5 MG/ML
INJECTION INTRAVENOUS
Status: DISCONTINUED | OUTPATIENT
Start: 2023-05-11 | End: 2023-05-11 | Stop reason: HOSPADM

## 2023-05-11 RX ORDER — CLOPIDOGREL BISULFATE 75 MG/1
TABLET ORAL
Status: DISCONTINUED | OUTPATIENT
Start: 2023-05-11 | End: 2023-05-11 | Stop reason: HOSPADM

## 2023-05-11 RX ORDER — ALPRAZOLAM 0.25 MG/1
0.25 TABLET ORAL 4 TIMES DAILY PRN
Status: DISCONTINUED | OUTPATIENT
Start: 2023-05-11 | End: 2023-05-12 | Stop reason: HOSPADM

## 2023-05-11 RX ORDER — MIDAZOLAM HYDROCHLORIDE 1 MG/ML
INJECTION INTRAMUSCULAR; INTRAVENOUS
Status: DISCONTINUED | OUTPATIENT
Start: 2023-05-11 | End: 2023-05-11 | Stop reason: HOSPADM

## 2023-05-11 RX ORDER — NICOTINE POLACRILEX 4 MG
15 LOZENGE BUCCAL
Status: DISCONTINUED | OUTPATIENT
Start: 2023-05-11 | End: 2023-05-12 | Stop reason: HOSPADM

## 2023-05-11 RX ORDER — ACETAMINOPHEN 325 MG/1
650 TABLET ORAL EVERY 4 HOURS PRN
Status: DISCONTINUED | OUTPATIENT
Start: 2023-05-11 | End: 2023-05-12 | Stop reason: HOSPADM

## 2023-05-11 RX ORDER — OXYMETAZOLINE HYDROCHLORIDE 0.05 G/100ML
2 SPRAY NASAL NIGHTLY
Status: DISCONTINUED | OUTPATIENT
Start: 2023-05-11 | End: 2023-05-12 | Stop reason: HOSPADM

## 2023-05-11 RX ADMIN — PRAVASTATIN SODIUM 80 MG: 40 TABLET ORAL at 20:03

## 2023-05-11 RX ADMIN — ASPIRIN 324 MG: 81 TABLET, CHEWABLE ORAL at 13:33

## 2023-05-11 RX ADMIN — GABAPENTIN 300 MG: 300 CAPSULE ORAL at 20:02

## 2023-05-11 RX ADMIN — SODIUM CHLORIDE 100 ML/HR: 9 INJECTION, SOLUTION INTRAVENOUS at 23:55

## 2023-05-11 RX ADMIN — SODIUM CHLORIDE 100 ML/HR: 9 INJECTION, SOLUTION INTRAVENOUS at 14:36

## 2023-05-11 RX ADMIN — OXYMETAZOLINE HYDROCHLORIDE 2 SPRAY: 0.05 SPRAY NASAL at 23:06

## 2023-05-11 RX ADMIN — Medication 10 ML: at 20:06

## 2023-05-11 RX ADMIN — Medication 10 ML: at 15:15

## 2023-05-11 NOTE — PROGRESS NOTES
Clinical Pharmacy Services: Medication History    Jesus Cuba is a 73 y.o. male presenting to [facility] for   Chief Complaint   Patient presents with   • Chest Pain   • Shortness of Breath       He  has a past medical history of CAD (coronary artery disease), Diabetes mellitus, Dyslipidemia, Hyperlipidemia, Hypertension, Hypothyroidism, and Myocardial infarction.    Allergies as of 05/11/2023   • (No Known Allergies)       Medication information was obtained from: Patient   Pharmacy and Phone Number:     Prior to Admission Medications     Prescriptions Last Dose Informant Patient Reported? Taking?    Canagliflozin (Invokana) 300 MG tablet tablet 5/11/2023 Self No Yes    Take 1 tablet by mouth Daily.    celecoxib (CeleBREX) 200 MG capsule Past Week Self No Yes    Take 1 capsule by mouth Daily.    Patient taking differently:  Take 1 capsule by mouth Daily As Needed.    clopidogrel (PLAVIX) 75 MG tablet 5/11/2023 Self No Yes    Take 1 tablet by mouth daily.    gabapentin (NEURONTIN) 300 MG capsule 5/11/2023 Self No Yes    TAKE 1 CAPSULE THREE TIMES DAILY AS NEEDED FOR PAIN    Patient taking differently:  Take 1 capsule by mouth 3 (Three) Times a Day.    irbesartan (AVAPRO) 300 MG tablet 5/11/2023 Self No Yes    TAKE 1 TABLET EVERY DAY    Patient taking differently:  Take 1 tablet by mouth Daily.    levothyroxine (SYNTHROID, LEVOTHROID) 75 MCG tablet 5/11/2023 Self No Yes    TAKE 1 TABLET EVERY DAY    Patient taking differently:  Take 1 tablet by mouth Every Morning.    meloxicam (MOBIC) 15 MG tablet 5/11/2023 Self Yes Yes    Take 1 tablet by mouth Daily.    metFORMIN (GLUCOPHAGE) 1000 MG tablet 5/11/2023 Self No Yes    TAKE 1 TABLET TWICE DAILY WITH MEALS    Patient taking differently:  Take 1 tablet by mouth 2 (Two) Times a Day With Meals.    metoprolol succinate XL (TOPROL-XL) 25 MG 24 hr tablet 5/11/2023 Self No Yes    TAKE 1/2 TABLET EVERY DAY    Patient taking differently:  Take 12.5 mg by  mouth Daily.    pravastatin (PRAVACHOL) 80 MG tablet 5/10/2023 Self No Yes    TAKE 1 TABLET EVERY DAY    Patient taking differently:  Take 1 tablet by mouth Every Night.    ranolazine (RANEXA) 500 MG 12 hr tablet 5/11/2023 Self No Yes    TAKE 1 TABLET TWICE DAILY    Patient taking differently:  Take 1 tablet by mouth 2 (Two) Times a Day.    SITagliptin (Januvia) 100 MG tablet 5/11/2023 Self No Yes    Take 1 tablet by mouth Daily.    isosorbide mononitrate (IMDUR) 30 MG 24 hr tablet   Yes No    Take 1 tablet by mouth Daily.    NITROSTAT 0.4 MG SL tablet  Self No No    DISSOLVE ONE TABLET UNDER THE TONGUE AS NEEDED    Patient taking differently:  Place 1 tablet under the tongue Every 5 (Five) Minutes As Needed.            Medication notes:     This medication list is complete to the best of my knowledge as of 5/11/2023    Please call if questions.    Moe Bran  Medication History Technician  [phone]    5/11/2023 13:48 EDT

## 2023-05-11 NOTE — ED PROVIDER NOTES
I supervised care provided by the midlevel provider.   We have discussed this patient's history, physical exam, and treatment plan.  I have reviewed the note and personally saw and examined the patient and agree with the plan of care.   I have seen and evaluated this patient.  For the past 10 days when he exerts himself he gets chest discomfort and shortness of breath.  It then resolves at rest.  This occurs if he does form of exertion such as hanging drapes or when he goes to mow the lawn.  Currently he is asymptomatic sitting in the bed in room 30.  He is not have any chest pain or shortness of breath.  He is concerned that this is coming from his heart.  He has had a history of a stent done about 20 years ago.  He is on Plavix.  Currently denies any symptoms.  Denies fever chills coughs or colds.  Denies any new swelling.    GENERAL: not distressed.  Elderly male.  HENT: nares patent  Head/neck/ face are symmetric without gross deformity or swelling  EYES: no scleral icterus  CV: regular rhythm, regular rate with intact distal pulses no murmur rub  RESPIRATORY: normal effort and no respiratory distress.  Clear to auscultation bilateral  ABDOMEN: soft and nontender  MUSCULOSKELETAL: no deformity  NEURO: alert and appropriate, moves all extremities, follows commands.  No focal motor or sensory changes SKIN: warm, dry    Vital signs and nursing notes reviewed.    Plan     I initially looked at this EKG at triage and requested the patient come back emergently.  I was informed at that time that there was no old EKG.  I then looked at a few old EKGs.  One from March 1, 2023 is well as from December 28, 2021.    My own independent interpretation of this EKG that was done at 11:25 AM at triage revealed a rate of 76 it is sinus rhythm  There are some nonspecific intraventricular conduction delay  There is Q waves in the inferior leads.  There are some nonspecific J-point elevation in aVR and V1 and some nonspecific ST  depression in V4 through V6 as well as 1 and aVL.  Patient has signs of LVH.  When I compare to his old EKG from March 1, 2023 as well as December 28, 2021 he had some similar EKG changes but they are more prominent on this EKG.  Had similar LVH as well.    He is currently asymptomatic.  We will get a check a troponin, lab work, chest x-ray.  We will get a give him aspirin and nitro glycerin paste.  We will consult with cardiology as well.  All questions answered at this time    ED Course as of 05/11/23 1652   Thu May 11, 2023   1144 Patient presents with intermittent exertional shortness of breath for several days.  No overt chest pain.  He does have a history of coronary artery disease.  He is asymptomatic at this time.  Differential diagnoses include but not limited to ACS, pneumonia, COPD, pneumothorax. [EE]   1145 EKG independently interpreted myself.  Time 1125.  Sinus rhythm, 76 bpm.  Normal P/NATALIE.  QRS shows nonspecific IVCD with left axis deviation.  There is ST abnormalities consistent with LVH.  These appear fairly unchanged versus 3/1/2023. [EE]   1235 WBC: 5.93 [EE]   1235 Hemoglobin: 13.5 [EE]   1240 Chest x-ray independently interpreted myself shows no evidence of infiltrate or pneumothorax. [EE]   1253 Creatinine: 1.18 [EE]   1253 Glucose(!): 214 [EE]   1300 HEART Score prior to Troponin 6:  +1 History, +1 EKG, +2 Age, +2 RFs   [EE]   1306 HS Troponin T(!!): 102 [EE]   1323 Discussed case with Dr. Montes, local cardiology.  He agrees to admit the patient.  He will likely need a heart catheterization.  He does ask to add a D-dimer. [EE]   1339 Dr. Montes at bedside.  He requests that we cancel the D-dimer at this time.  He does plan to take the patient for heart catheterization. [EE]   1651 HS Troponin T(!!): 102 [MM]   1651 HS Troponin T(!!): 103 [MM]   1651 BUN(!): 24  Cardiology was consulted and they came down saw the patient in the emergency department.  The plan is to take the gentleman  to cardiac cath. [MM]   1651 He has remained without chest pain or shortness of breath here in the emergency department. [MM]      ED Course User Index  [EE] Shin Banks, PA  [MM] Beau Simeon MD Molinari, Mark, MD  05/11/23 1652

## 2023-05-11 NOTE — ED TRIAGE NOTES
Patient c/o chest pain and shortness of breath that started yesterday. Hx of heart stent placement.

## 2023-05-11 NOTE — Clinical Note
First balloon inflation max pressure = 14 yasmani. First balloon inflation duration = 10 seconds. Second inflation of balloon - Max pressure = 14 yasmani. 2nd Inflation of balloon - Duration = 10 seconds. 2nd inflation was done at 17:30 EDT. The balloon is positioned in the Mid segment of the vessel.

## 2023-05-11 NOTE — Clinical Note
First balloon inflation max pressure = 12 yasmani. First balloon inflation duration = 5 seconds. Second inflation of balloon - Max pressure = 14 yasmani. 2nd Inflation of balloon - Duration = 5 seconds. 2nd inflation was done at 16:52 EDT. The balloon is positioned in the Proximal segment of the vessel. Third inflation of balloon - Max pressure = 14 yasmani. 3rd Inflation of balloon - Duration = 5 seconds. 3rd inflation was done at 16:52 EDT. The b alloon is positioned in the Proximal segment of the vessel. Fourth inflation of balloon - Max pressure = 12 yasmani. 4th Inflation of balloon - Duration = 5 seconds. 4th inflation was done at 16:52 EDT. The balloon is positioned in the Proximal segment of the vessel.

## 2023-05-11 NOTE — Clinical Note
First balloon inflation max pressure = 12 yasmani. First balloon inflation duration = 5 seconds. Second inflation of balloon - Max pressure = 12 yasmani. 2nd Inflation of balloon - Duration = 5 seconds. 2nd inflation was done at 16:53 EDT. The balloon is positioned in the Proximal segment of the vessel.

## 2023-05-11 NOTE — Clinical Note
First balloon inflation max pressure = 16 yasmani. First balloon inflation duration = 5 seconds. Second inflation of balloon - Max pressure = 14 yasmani. 2nd Inflation of balloon - Duration = 5 seconds. 2nd inflation was done at 16:55 EDT. The balloon is positioned in the Proximal segment of the vessel. Third inflation of balloon - Max pressure = 18 yasmani. 3rd Inflation of balloon - Duration = 8 seconds. 3rd inflation was done at 16:55 EDT. The b alloon is positioned in the Proximal segment of the vessel.

## 2023-05-11 NOTE — H&P
Date of Admission: 23    Encounter Provider: Juan Montes MD    Group of Service: [facility]     Patient Name: Jesus Cuba    :1950    Chief complaint: Chest pain.    History of Present Illness:      This is a very pleasant 73 year-old male with a history of coronary artery disease, diabetes, and hypertension.  He is currently followed by Dr. Dey in our group.    The patient has a history of coronary artery disease, and a total of 4 stents (to the mid LAD, proximal PDA, and first diagonal branch)..  His first stent was placed in .  His most recent heart catheterization on 7/3/2018 at [facility] showed 30 to 40% mid LAD disease just prior to his stent, a 95% lesion in the first diagonal branch, severe diffuse left circumflex disease treated medically, and 50 to 60% proximal to the PDA stent and 40 to 50% distal to the stent.  He underwent placement of a COREY to the first diagonal branch at that time.    The patient states that for the last several weeks, he has had increasing dyspnea on exertion.  He states that he was mowing the grass recently and had to stop 3 times and rest.  This is not his normal baseline.  He also had burning pain in his upper chest and throat area during those occasions.  He again had the discomfort yesterday, and he began experiencing palpitations where his heart would skip beats and then beat hard (suspicious for PVCs).  His initial EKG did not show ischemic changes, although his troponin was elevated at 102.    Past Medical History:   Diagnosis Date   • CAD (coronary artery disease)    • Diabetes mellitus    • Dyslipidemia    • Hyperlipidemia    • Hypertension    • Hypothyroidism    • Myocardial infarction          Past Surgical History:   Procedure Laterality Date   • CARDIAC CATHETERIZATION  2018   • CARDIAC SURGERY     • CORONARY ANGIOPLASTY WITH STENT PLACEMENT      4 stents  over several years - first in    • SKIN BIOPSY       shoulder left         No Known Allergies      No current facility-administered medications on file prior to encounter.     Current Outpatient Medications on File Prior to Encounter   Medication Sig Dispense Refill   • Canagliflozin (Invokana) 300 MG tablet tablet Take 1 tablet by mouth Daily. 90 tablet 1   • celecoxib (CeleBREX) 200 MG capsule Take 1 capsule by mouth Daily. (Patient taking differently: Take 1 capsule by mouth Daily As Needed.) 30 capsule 5   • clopidogrel (PLAVIX) 75 MG tablet Take 1 tablet by mouth daily. 90 tablet 1   • gabapentin (NEURONTIN) 300 MG capsule TAKE 1 CAPSULE THREE TIMES DAILY AS NEEDED FOR PAIN (Patient taking differently: Take 1 capsule by mouth 3 (Three) Times a Day.) 270 capsule 1   • irbesartan (AVAPRO) 300 MG tablet TAKE 1 TABLET EVERY DAY (Patient taking differently: Take 1 tablet by mouth Daily.) 90 tablet 3   • levothyroxine (SYNTHROID, LEVOTHROID) 75 MCG tablet TAKE 1 TABLET EVERY DAY (Patient taking differently: Take 1 tablet by mouth Every Morning.) 90 tablet 3   • meloxicam (MOBIC) 15 MG tablet Take 1 tablet by mouth Daily.     • metFORMIN (GLUCOPHAGE) 1000 MG tablet TAKE 1 TABLET TWICE DAILY WITH MEALS (Patient taking differently: Take 1 tablet by mouth 2 (Two) Times a Day With Meals.) 180 tablet 1   • metoprolol succinate XL (TOPROL-XL) 25 MG 24 hr tablet TAKE 1/2 TABLET EVERY DAY (Patient taking differently: Take 12.5 mg by mouth Daily.) 45 tablet 2   • pravastatin (PRAVACHOL) 80 MG tablet TAKE 1 TABLET EVERY DAY (Patient taking differently: Take 1 tablet by mouth Every Night.) 90 tablet 3   • ranolazine (RANEXA) 500 MG 12 hr tablet TAKE 1 TABLET TWICE DAILY (Patient taking differently: Take 1 tablet by mouth 2 (Two) Times a Day.) 180 tablet 1   • SITagliptin (Januvia) 100 MG tablet Take 1 tablet by mouth Daily. 90 tablet 2   • isosorbide mononitrate (IMDUR) 30 MG 24 hr tablet Take 1 tablet by mouth Daily.  1   • NITROSTAT 0.4 MG SL tablet DISSOLVE ONE TABLET UNDER  "THE TONGUE AS NEEDED (Patient taking differently: Place 1 tablet under the tongue Every 5 (Five) Minutes As Needed.) 25 tablet 0   • [DISCONTINUED] Accu-Chek Guide test strip TEST BLOOD SUGAR TWICE DAILY 200 each 3   • [DISCONTINUED] Accu-Chek Softclix Lancets lancets Use to Check Glucose Twice daily 100 each 2   • [DISCONTINUED] glucose blood (ONE TOUCH ULTRA TEST) test strip USE 1 STRIP TO CHECK GLUCOSE TWICE DAILY 100 each 6   • [DISCONTINUED] glucose monitor monitoring kit Use to Check Glucose Twice Daily 1 each 0         Social History     Socioeconomic History   • Marital status: Single   Tobacco Use   • Smoking status: Never   • Smokeless tobacco: Never   Substance and Sexual Activity   • Alcohol use: Yes     Alcohol/week: 7.0 standard drinks     Types: 7 Shots of liquor per week   • Drug use: Never   • Sexual activity: Yes     Partners: Female         Family History   Problem Relation Age of Onset   • Cancer Mother         skin   • COPD Mother    • Hyperlipidemia Mother    • Hypertension Mother    • Cancer Father         lung   • Diabetes Father    • Heart disease Father    • Hyperlipidemia Father    • Hypertension Father    • Early death Brother         50   • Alcohol abuse Brother    • Colon cancer Neg Hx    • Colon polyps Neg Hx        REVIEW OF SYSTEMS:   Pertinent positives were noted in the HPI above.  Otherwise, all other systems were reviewed, and are negative..       Objective:     Vitals:    05/11/23 1123 05/11/23 1126 05/11/23 1127   BP:   124/73   Pulse: 81     Resp: 16     Temp: 97.1 °F (36.2 °C)     TempSrc: Tympanic     SpO2: 99%     Weight:  74.8 kg (165 lb)    Height:  170.2 cm (67\")      Body mass index is 25.84 kg/m².  Flowsheet Rows    Flowsheet Row First Filed Value   Admission Height 170.2 cm (67\") Documented at 05/11/2023 1126   Admission Weight 74.8 kg (165 lb) Documented at 05/11/2023 1126           General:    No acute distress, alert and oriented x4, pleasant                   " Head:    Normocephalic, atraumatic.   Eyes:          Conjunctivae and sclerae normal, no icterus.   Throat:   No oral lesions, no thrush, oral mucosa moist.    Neck:   Supple, trachea midline.   Lungs:     Clear to auscultation bilaterally     Heart:    Regular rhythm and normal rate.  No murmurs, gallops, or rubs noted.   Abdomen:     Soft, non-tender, non-distended, positive bowel sounds.    Extremities:   No clubbing, cyanosis, or edema.     Pulses:   Pulses palpable and equal bilaterally.    Skin:   No bleeding or rash.   Neuro:   Non-focal.  Moves all extremities well.    Psychiatric:   Normal mood and affect.           Lab Review:                Results from last 7 days   Lab Units 05/11/23  1218   SODIUM mmol/L 139   POTASSIUM mmol/L 3.9   CHLORIDE mmol/L 106   CO2 mmol/L 23.0   BUN mg/dL 24*   CREATININE mg/dL 1.18   GLUCOSE mg/dL 214*   CALCIUM mg/dL 9.0     Results from last 7 days   Lab Units 05/11/23  1218   HSTROP T ng/L 102*     Results from last 7 days   Lab Units 05/11/23  1218   WBC 10*3/mm3 5.93   HEMOGLOBIN g/dL 13.5   HEMATOCRIT % 41.1   PLATELETS 10*3/mm3 189                       EKG (reviewed by me personally): Normal sinus rhythm, poor R wave progression, lateral ST changes which are similar to previous EKG.      Assessment:   1.  NSTEMI  2.  Coronary artery disease, status post total of 4 stents in the past (including to the mid LAD, proximal PDA, and first diagonal branch).  Last stent was in 2018 to the first diagonal branch.  3.  Palpitations, suspicious for PVCs  4.  Type 2 diabetes  5.  Hypertension    Plan:       The patient has fairly extensive coronary artery disease in the past.  His last heart catheterization 2018 showed severe disease in the first diagonal branch which was stented.  He had severe diffuse disease in the left circumflex left and 95% which was treated medically.  He also had 50 to 60% disease prior to the PDA stent and disease after the stent as well.  Given that his  troponin is elevated at 102, his symptoms are concerning for an NSTEMI.  I have recommended a left heart catheterization at this time.  The patient was in agreement with this plan.  He will be hydrated in preparation for this.  The catheterization will be performed later today.  An echocardiogram has also been ordered to evaluate his ejection fraction.    He will continue on dual antiplatelet therapy with aspirin and Plavix.  He is currently on pravastatin 80 mg/day.  I will need to clarify whether he has had trouble taking other statin such as Lipitor or Crestor before changing this over.  He is already on losartan and Toprol-XL as well.    Tej Montes MD

## 2023-05-11 NOTE — Clinical Note
First balloon inflation max pressure = 16 yasmani. First balloon inflation duration = 30 seconds. Second inflation of balloon - Max pressure = 14 yasmani. 2nd Inflation of balloon - Duration = 10 seconds. 2nd inflation was done at 16:58 EDT. The balloon is positioned in the Proximal segment of the vessel.

## 2023-05-11 NOTE — Clinical Note
First balloon inflation max pressure = 20 yasmani. First balloon inflation duration = 10 seconds. Second inflation of balloon - Max pressure = 20 yasmani. 2nd Inflation of balloon - Duration = 10 seconds. 2nd inflation was done at 17:57 EDT. Third inflation of balloon - Max pressure = 20 yasmani. 3rd Inflation of balloon - Duration = 10 seconds. 3rd inflation was done at 17:57 EDT.

## 2023-05-11 NOTE — ED NOTES
"Nursing report ED to floor  Jesus Cuba  73 y.o.  male    HPI :   Chief Complaint   Patient presents with    Chest Pain    Shortness of Breath       Admitting doctor:   Juan Montes MD    Admitting diagnosis:   The primary encounter diagnosis was Exertional dyspnea. Diagnoses of History of CAD (coronary artery disease) and Poorly controlled diabetes mellitus were also pertinent to this visit.    Code status:   Current Code Status       Date Active Code Status Order ID Comments User Context       Not on file            Allergies:   Patient has no known allergies.    Isolation:   No active isolations    Intake and Output  No intake or output data in the 24 hours ending 05/11/23 1353    Weight:       05/11/23  1126   Weight: 74.8 kg (165 lb)       Most recent vitals:   Vitals:    05/11/23 1123 05/11/23 1126 05/11/23 1127   BP:   124/73   Pulse: 81     Resp: 16     Temp: 97.1 °F (36.2 °C)     TempSrc: Tympanic     SpO2: 99%     Weight:  74.8 kg (165 lb)    Height:  170.2 cm (67\")        Active LDAs/IV Access:   Lines, Drains & Airways       Active LDAs       Name Placement date Placement time Site Days    Peripheral IV 05/11/23 1218 Right Antecubital 05/11/23  1218  Antecubital  less than 1                    Labs (abnormal labs have a star):   Labs Reviewed   COMPREHENSIVE METABOLIC PANEL - Abnormal; Notable for the following components:       Result Value    Glucose 214 (*)     BUN 24 (*)     All other components within normal limits    Narrative:     GFR Normal >60  Chronic Kidney Disease <60  Kidney Failure <15    The GFR formula is only valid for adults with stable renal function between ages 18 and 70.   TROPONIN - Abnormal; Notable for the following components:    HS Troponin T 102 (*)     All other components within normal limits    Narrative:     High Sensitive Troponin T Reference Range:  <10.0 ng/L- Negative Female for AMI  <15.0 ng/L- Negative Male for AMI  >=10 - Abnormal Female indicating " possible myocardial injury.  >=15 - Abnormal Male indicating possible myocardial injury.   Clinicians would have to utilize clinical acumen, EKG, Troponin, and serial changes to determine if it is an Acute Myocardial Infarction or myocardial injury due to an underlying chronic condition.        CBC WITH AUTO DIFFERENTIAL - Normal   HIGH SENSITIVITIY TROPONIN T 2HR   CBC AND DIFFERENTIAL    Narrative:     The following orders were created for panel order CBC & Differential.  Procedure                               Abnormality         Status                     ---------                               -----------         ------                     CBC Auto Differential[[id number]]        Normal              Final result                 Please view results for these tests on the individual orders.       EKG:   ECG 12 Lead Chest Pain   Preliminary Result   HEART RATE= 76  bpm   RR Interval= 789  ms   HI Interval= 185  ms   P Horizontal Axis= -14  deg   P Front Axis= 50  deg   QRSD Interval= 129  ms   QT Interval= 404  ms   QRS Axis= -43  deg   T Wave Axis= 132  deg   - ABNORMAL ECG -   Sinus rhythm   Nonspecific IVCD with LAD   LVH with secondary repolarization abnormality   Electronically Signed By:    Date and Time of Study: 2023-05-11 11:25:31          Meds given in ED:   Medications   sodium chloride 0.9 % flush 10 mL (has no administration in time range)   sodium chloride 0.9 % infusion (has no administration in time range)   aspirin chewable tablet 324 mg (324 mg Oral Given 5/11/23 1333)       Imaging results:  XR Chest 1 View    Result Date: 5/11/2023  No evidence for acute pulmonary process. Follow-up as clinical indications persist.  This report was finalized on 5/11/2023 12:35 PM by Dr. Tanner Patiño M.D.      CT Chest Low Dose Cancer Screening WO    Result Date: 5/11/2023  1.ACR lung RADS category 2S: Benign appearance. Recommend continued annual surveillance. 2. Increasing reticulonodular/groundglass  opacities favoring progression of interstitial lung disease. However, please correlate clinically to exclude superimposed pneumonitis and followup as indicated. 3. Please see above for additional findings/recommendations. CTDI 2.9 mGy.  mGycm.       Ambulatory status:   - up at [facility]     Social issues:   Social History     Socioeconomic History    Marital status: Single   Tobacco Use    Smoking status: Never    Smokeless tobacco: Never   Substance and Sexual Activity    Alcohol use: Yes     Alcohol/week: 7.0 standard drinks     Types: 7 Shots of liquor per week    Drug use: Never    Sexual activity: Yes     Partners: Female       NIH Stroke Scale:         Anupama Ramirez RN  05/11/23 13:53 EDT

## 2023-05-11 NOTE — ED PROVIDER NOTES
EMERGENCY DEPARTMENT ENCOUNTER    Room Number:  3116/1  Date of encounter:  5/11/2023  PCP: Rigoberto Lowe III, NP-C  Historian: Patient  Chronic or social conditions impacting care (social determinants of health): Nothing      I used full protective equipment while examining this patient.  This includes face mask, gloves and protective eyewear.  I washed my hands before entering the room and immediately upon leaving the room      HPI:  Chief Complaint: Shortness of breath  A complete HPI/ROS/PMH/PSH/SH/FH are unobtainable due to: Nothing    Context: Jesus Cuba is a 73 y.o. male who presents to the ED c/o several day history of exertional shortness of breath.  Patient reports a tightness and shortness of breath over his right anterior chest with exertion.  He denies any overt chest pain.  This for started 4 days ago.  He reports several different episodes of this.  He denies any symptoms whatsoever at this time.  He does have a history of coronary artery disease, diabetes, hypertension.  He reports while cutting the grass several days ago he had to stop multiple times due to shortness of breath which is abnormal for him.    Review of prior external notes (non-ED):   I reviewed cardiology office visit from 3/1/2023.  Patient being followed for coronary artery disease.    Review of prior external test results outside of this encounter:  I reviewed his CMP from 2/20/2023.  Creatinine 1.00.    PAST MEDICAL HISTORY  Active Ambulatory Problems     Diagnosis Date Noted   • Essential hypertension 06/03/2016   • Hypothyroidism 06/03/2016   • Anxiety 12/06/2017   • BPH associated with nocturia 12/06/2017   • History of ASCVD 12/06/2017   • Nicotine dependence in remission 12/06/2017   • Vasculogenic erectile dysfunction 12/06/2017   • Umbilical hernia 12/06/2017   • Type 2 diabetes mellitus with hyperglycemia, without long-term current use of insulin 07/31/2018   • Hyperlipidemia LDL goal <70 07/31/2018   •  CAD (coronary artery disease) 08/20/2019   • History of coronary artery stent placement 02/11/2020   • History of myocardial infarction 10/19/2016   • PN (peripheral neuropathy) 01/19/2021   • Medication management contract signed 01/19/2021   • PVD (peripheral vascular disease) 11/22/2021   • Vitamin D deficiency 05/23/2022   • OA (osteoarthritis) 12/05/2022     Resolved Ambulatory Problems     Diagnosis Date Noted   • Diabetes mellitus 06/03/2016   • Prostate cancer screening 12/18/2018   • Encounter for long-term (current) drug use 12/18/2018   • Type 2 diabetes mellitus without complication 12/18/2018   • Muscle strain 01/03/2019   • Unstable angina 07/03/2018     Past Medical History:   Diagnosis Date   • Dyslipidemia    • Hyperlipidemia    • Hypertension    • Myocardial infarction          PAST SURGICAL HISTORY  Past Surgical History:   Procedure Laterality Date   • CARDIAC CATHETERIZATION  07/03/2018   • CARDIAC SURGERY     • CORONARY ANGIOPLASTY WITH STENT PLACEMENT      4 stents  over several years - first in 2002   • SKIN BIOPSY      shoulder left         FAMILY HISTORY  Family History   Problem Relation Age of Onset   • Cancer Mother         skin   • COPD Mother    • Hyperlipidemia Mother    • Hypertension Mother    • Cancer Father         lung   • Diabetes Father    • Heart disease Father    • Hyperlipidemia Father    • Hypertension Father    • Early death Brother         50   • Alcohol abuse Brother    • Colon cancer Neg Hx    • Colon polyps Neg Hx          SOCIAL HISTORY  Social History     Socioeconomic History   • Marital status: Single   Tobacco Use   • Smoking status: Never   • Smokeless tobacco: Never   Vaping Use   • Vaping Use: Never used   Substance and Sexual Activity   • Alcohol use: Not Currently   • Drug use: Never   • Sexual activity: Yes     Partners: Female         ALLERGIES  Patient has no known allergies.        REVIEW OF SYSTEMS  All systems reviewed and negative except for those  discussed in HPI.       PHYSICAL EXAM    I have reviewed the triage vital signs and nursing notes.    ED Triage Vitals   Temp Heart Rate Resp BP SpO2   05/11/23 1123 05/11/23 1123 05/11/23 1123 05/11/23 1127 05/11/23 1123   97.1 °F (36.2 °C) 81 16 124/73 99 %      Temp src Heart Rate Source Patient Position BP Location FiO2 (%)   05/11/23 1123 -- -- -- --   Tympanic           Physical Exam  GENERAL: Alert, oriented, chronically ill-appearing  HENT: head atraumatic, no nuchal rigidity  EYES: no scleral icterus, EOMI  CV: regular rhythm, regular rate, no murmur  RESPIRATORY: normal effort, CTA  ABDOMEN: soft, nontender  MUSCULOSKELETAL: no deformity, FROM, no calf swelling or tenderness  NEURO: alert, moves all extremities, follows commands  SKIN: warm, dry        LAB RESULTS  Recent Results (from the past 24 hour(s))   ECG 12 Lead Chest Pain    Collection Time: 05/11/23 11:25 AM   Result Value Ref Range    QT Interval 404 ms   Comprehensive Metabolic Panel    Collection Time: 05/11/23 12:18 PM    Specimen: Blood   Result Value Ref Range    Glucose 214 (H) 65 - 99 mg/dL    BUN 24 (H) 8 - 23 mg/dL    Creatinine 1.18 0.76 - 1.27 mg/dL    Sodium 139 136 - 145 mmol/L    Potassium 3.9 3.5 - 5.2 mmol/L    Chloride 106 98 - 107 mmol/L    CO2 23.0 22.0 - 29.0 mmol/L    Calcium 9.0 8.6 - 10.5 mg/dL    Total Protein 6.4 6.0 - 8.5 g/dL    Albumin 4.2 3.5 - 5.2 g/dL    ALT (SGPT) 17 1 - 41 U/L    AST (SGOT) 12 1 - 40 U/L    Alkaline Phosphatase 71 39 - 117 U/L    Total Bilirubin 0.4 0.0 - 1.2 mg/dL    Globulin 2.2 gm/dL    A/G Ratio 1.9 g/dL    BUN/Creatinine Ratio 20.3 7.0 - 25.0    Anion Gap 10.0 5.0 - 15.0 mmol/L    eGFR 65.6 >60.0 mL/min/1.73   High Sensitivity Troponin T    Collection Time: 05/11/23 12:18 PM    Specimen: Blood   Result Value Ref Range    HS Troponin T 102 (C) <15 ng/L   CBC Auto Differential    Collection Time: 05/11/23 12:18 PM    Specimen: Blood   Result Value Ref Range    WBC 5.93 3.40 - 10.80 10*3/mm3     RBC 4.76 4.14 - 5.80 10*6/mm3    Hemoglobin 13.5 13.0 - 17.7 g/dL    Hematocrit 41.1 37.5 - 51.0 %    MCV 86.3 79.0 - 97.0 fL    MCH 28.4 26.6 - 33.0 pg    MCHC 32.8 31.5 - 35.7 g/dL    RDW 13.9 12.3 - 15.4 %    RDW-SD 44.0 37.0 - 54.0 fl    MPV 10.2 6.0 - 12.0 fL    Platelets 189 140 - 450 10*3/mm3    Neutrophil % 49.1 42.7 - 76.0 %    Lymphocyte % 34.2 19.6 - 45.3 %    Monocyte % 11.3 5.0 - 12.0 %    Eosinophil % 4.2 0.3 - 6.2 %    Basophil % 1.0 0.0 - 1.5 %    Immature Grans % 0.2 0.0 - 0.5 %    Neutrophils, Absolute 2.91 1.70 - 7.00 10*3/mm3    Lymphocytes, Absolute 2.03 0.70 - 3.10 10*3/mm3    Monocytes, Absolute 0.67 0.10 - 0.90 10*3/mm3    Eosinophils, Absolute 0.25 0.00 - 0.40 10*3/mm3    Basophils, Absolute 0.06 0.00 - 0.20 10*3/mm3    Immature Grans, Absolute 0.01 0.00 - 0.05 10*3/mm3    nRBC 0.0 0.0 - 0.2 /100 WBC   High Sensitivity Troponin T 2Hr    Collection Time: 23  2:40 PM    Specimen: Blood   Result Value Ref Range    HS Troponin T 103 (C) <15 ng/L    Troponin T Delta 1 >=-4 - <+4 ng/L   POC Glucose Once    Collection Time: 23  3:40 PM    Specimen: Blood   Result Value Ref Range    Glucose 77 70 - 130 mg/dL       Ordered the above labs and independently reviewed the results.        RADIOLOGY  Cardiac Catheterization/Vascular Study    Result Date: 2023  [facility] CARDIAC CATHETERIZATION PROCEDURE REPORT Patient: Jesus Cuba : 1950 MRN: [id number] Procedure Date: 23 Referring Physician: MD Branden Trotter MD Interventional Cardiologist: Petr Cao MD Indication: NSTEMI Clinical Presentation: 73-year-old man with CAD status post prior PCI with stenting to mid LAD, first diagonal, PDA, hypertension, hyperlipidemia, diabetes who presented with progressive dyspnea on exertion, found to have elevated troponin consistent with NSTEMI, referred for left heart catheterization for further evaluation. Procedure performed: Diagnostic Left  Heart Catheterization Coronary Angiography Drug eluting stent to proximal LAD Balloon angioplasty to first diagonal Intravascular ultrasound Access Sites: right radial artery Findings: 1. Coronary Artery Anatomy: Dominance: right dominant Left Main: Large-caliber vessel with a 50% ostial stenosis (MLA slightly above 6 on IVUS).  This vessel trifurcates into a large caliber LAD, medium caliber ramus, and small caliber circumflex. Left Anterior Descending: Large-caliber vessel with a 90% in-stent restenosis of a mid LAD stent.  The mid LAD has a 30% stenosis.  The distal LAD is normal and wraps around the apex.  The LAD supplies collateral to the RCA via the PDA. Diagonals: The first diagonal is a medium caliber vessel with a proximal stent.  There is an ostial 70% stenosis and a proximal, in-stent 90%, calcified stenosis. Ramus: Medium caliber vessel with a 95% proximal stenosis.  The remaining distal vessel has minor luminal irregularities. Circumflex Artery: Small caliber vessel with severe diffuse disease. Right Coronary Artery: Large-caliber vessel with a 100% proximal stenosis.  There is a very high takeoff of the acute marginal that supplies some of the inferior wall and supplies the right to right collaterals to the PDA.  The midportion of this marginal is stented and the stent has minor luminal air clarity's.  Distal to the stent there is a 50% stenosis. 2. Hemodynamics: Left Ventricle: 139/20 mmHg Aorta: 139/68/99 mmHg 3. Percutaneous Intervention: Location: Proximal LAD, proximal first diagonal Treatment: A jonah blue coronary guidewire was used to easily cross the lesion and parked in the distal LAD.  A jonah blue coronary guidewire was used to easily cross the lesion and parked in the distal first diagonal. A 2.5 x 12mm balloon was used to predilate the in-stent restenosis of the diagonal with a total of 6 inflations, maximum inflation pressure 12 yasmani.  A 2.5 x 12mm balloon was used to predilate the LAD  stenosis with a total of 3 inflations, maximum patient pressure 18 yasmani.  Due to significant slippage of the balloon while dilating the proximal stenosis of the diagonal, a 2.5 x 20mm balloon was used to predilate the lesion with a total of 2 inflations, maximum inflation pressure 14 yasmani.  Kissing balloon inflation was then performed using a 2.5 x 20mm balloon in the diagonal and a 2.5 x 12mm balloon in the LAD with inflation pressures of 10 yasmani.  Due to significant waist in the diagonal balloon, a 2.5 x 15mm NC balloon was used to predilate the stenosis, with adequate lesion expansion at 20 yasmani.  A 2.5 x 15mm NC was then used to dilate the LAD stenosis with inflation pressure up to 24 yasmani.  Intravascular sound was then performed which revealed a distal reference vessel of 3.0 mm in the proximal LAD vessel of about 5.0 mm.  The vessel was heavily calcified within the stent as well as proximal to the stent.  Intravascular ultrasound of the proximal left main was also performed which revealed an MLA of 6.24 mm².  A 3.0 x 15mm NC balloon was then used to dilate the LAD with a total of 2 inflations, maximum inflation pressure 14 yasmani.  A 3.0 x 38mm Xience Skypoint drug-eluting stent was then deployed across the LAD lesion at inflation pressure of 20 yasmani.  The jonah blue in the diagonal was withdrawn and used to easily rewire into the diagonal.  A 4.0 x 15mm NC was then used to post dilate the LAD stent with a total of 4 inflations, maximum inflation pressure 20 yasmani.  A 2.5 x 15mm NC was advanced into the ostium of the diagonal and inflated up to 12 yasmani.  A 3.0 x 15mm NC was then advanced into the LAD and used to post dilate the entire stent, specifically the mid to distal portion with 1 inflation in the proximal portion over the ostium of the diagonal, with inflation pressure of 20 yasmani. Pre-stenosis: 90 % for LAD and D1 Post-stenosis: < 1 % for LAD, 10% for D1 Lesion Type C: Yes, both MARIA D Flow Pre: 3 MARIA D Flow Post: 3  Bifurcation: Yes Severe Calcium: Yes Dissection: No Conclusions: Severe multivessel coronary artery disease. Successful intravascular ultrasound guided PCI with 3.0 x 38mm Xience Skypoint drug-eluting stent (postdilated with a 4.0 mm NC) to severe proximal LAD stenosis and in-stent restenosis. Successful balloon angioplasty to severe in-stent restenosis of first diagonal. Proximal RCA . Severe proximal ramus disease. Recommendations: Continue aspirin 81 mg daily, Plavix 75 mg daily, high-dose statin Return for staged PCI to proximal ramus. Procedure Status: Urgent Procedure Details: Informed consent was obtained with an explanation of the risk and benefits of the procedure. The patient was brought to the Cardiac Catheterization Laboratory and was prepped and draped in a standard sterile fashion. Moderate sedation with Fentanyl and Versed was administered by the circulating nurse. Lidocaine 2% was used to anesthetize over the right radial artery and a 6 Fr Slender sheath was placed.  A Sweet Toothterity Ultra catheter was then advanced over a 0.035 guidewire into the ascending aorta.  This catheter was used to cross the aortic valve to measure left ventricular pressures and pullback was performed across the aortic valve to measure the pressure gradient. The catheter was then used to engage the right and left coronary arteries with diagnostic angiography obtained.  After diagnostic portion of the procedure was performed, I discussed the nature of his disease for about 5 to 7 minutes.  I discussed that his best option was slightly CABG given his severe multivessel CAD and in-stent restenosis.  His syntax score is high.  Patient was adamantly refusing the possibility of CABG and wanted to proceed with PCI.  We discussed the possibility of CABG at a later date, and patient chose to pursue PCI and consider CABG if needed in the future.  At this time, the catheter was exchanged over a wire for a 6 Fr JL 3.5  guiding  catheter.  Intravenous heparin was given by the circulating nurse and ACT's were checked periodically to ensure they were at goal. A jonah blue coronary guidewire was used to easily cross the lesion and parked in the distal LAD.  A jonah blue coronary guidewire was used to easily cross the lesion and parked in the distal first diagonal.  Intracoronary nitroglycerin and Cardene were given throughout the case to ensure vasodilation and prevent no reflow.  A 2.5 x 12mm balloon was used to predilate the in-stent restenosis of the diagonal with a total of 6 inflations, maximum inflation pressure 12 yasmnai.  A 2.5 x 12mm balloon was used to predilate the LAD stenosis with a total of 3 inflations, maximum patient pressure 18 yasmani.  Due to significant slippage of the balloon while dilating the proximal stenosis of the diagonal, a 2.5 x 20mm balloon was used to predilate the lesion with a total of 2 inflations, maximum inflation pressure 14 yasmani.  Kissing balloon inflation was then performed using a 2.5 x 20mm balloon in the diagonal and a 2.5 x 12mm balloon in the LAD with inflation pressures of 10 yasmani.  Due to significant waist in the diagonal balloon, a 2.5 x 15mm NC balloon was used to predilate the stenosis, with adequate lesion expansion at 20 yasmani.  A 2.5 x 15mm NC was then used to dilate the LAD stenosis with inflation pressure up to 24 yasmani.  Intravascular sound was then performed which revealed a distal reference vessel of 3.0 mm in the proximal LAD vessel of about 5.0 mm.  The vessel was heavily calcified within the stent as well as proximal to the stent.  Intravascular ultrasound of the proximal left main was also performed which revealed an MLA of 6.24 mm².  A 3.0 x 15mm NC balloon was then used to dilate the LAD with a total of 2 inflations, maximum inflation pressure 14 yasmani.  A 3.0 x 38mm Xience Skypoint drug-eluting stent was then deployed across the LAD lesion at inflation pressure of 20 yasmani.  The jonah blue in the  diagonal was withdrawn and used to easily rewire into the diagonal.  A 4.0 x 15mm NC was then used to post dilate the LAD stent with a total of 4 inflations, maximum inflation pressure 20 yasmani.  A 2.5 x 15mm NC was advanced into the ostium of the diagonal and inflated up to 12 yasmani.  A 3.0 x 15mm NC was then advanced into the LAD and used to post dilate the entire stent, specifically the mid to distal portion with 1 inflation in the proximal portion over the ostium of the diagonal, with inflation pressure of 20 yasmani. Post PCI, there was MARIA D-3 flow, there was no evidence of residual dissection or perforation.  The catheter was removed over a wire. The radial artery sheath was removed without difficulty and a Vasc-Band was placed over the access site with excellent hemostasis. Patient tolerated the procedure well without any complications, and transferred to the post procedure area for recovery in a stable condition. Complications: None. Estimated Blood Loss: Minimal. Petr Cao MD [facility] 05/11/23 18:21 EDT     XR Chest 1 View    Result Date: 5/11/2023  XR CHEST 1 VW-  HISTORY: Male who is 73 years-old,  chest pain  TECHNIQUE: Frontal view of the chest  COMPARISON: 09/01/2020  FINDINGS: Heart, mediastinum and pulmonary vasculature are unremarkable. No focal pulmonary consolidation, pleural effusion, or pneumothorax. No acute osseous process.      No evidence for acute pulmonary process. Follow-up as clinical indications persist.  This report was finalized on 5/11/2023 12:35 PM by Dr. Tanner Patiño M.D.        I ordered the above noted radiological studies. Reviewed by me and discussed with radiologist.  See dictation for official radiology interpretation.      MEDICATIONS GIVEN IN ER    Medications   sodium chloride 0.9 % flush 10 mL ( Intravenous MAR Unhold 5/11/23 1850)   sodium chloride 0.9 % infusion (100 mL/hr Intravenous Restarted 5/11/23 1856)   empagliflozin (JARDIANCE) tablet 25 mg  (has no administration in time range)   clopidogrel (PLAVIX) tablet 75 mg (75 mg Oral Not Given 5/11/23 1501)   gabapentin (NEURONTIN) capsule 300 mg (300 mg Oral Given 5/11/23 2002)   losartan (COZAAR) tablet 100 mg (has no administration in time range)   levothyroxine (SYNTHROID, LEVOTHROID) tablet 75 mcg (has no administration in time range)   metoprolol succinate XL (TOPROL-XL) 24 hr tablet 12.5 mg (12.5 mg Oral Not Given 5/11/23 1501)   nitroglycerin (NITROSTAT) SL tablet 0.4 mg (has no administration in time range)   pravastatin (PRAVACHOL) tablet 80 mg (80 mg Oral Given 5/11/23 2003)   ranolazine (RANEXA) 12 hr tablet 500 mg (500 mg Oral Not Given 5/11/23 2002)   linagliptin (TRADJENTA) tablet 5 mg (has no administration in time range)   dextrose (GLUTOSE) oral gel 15 g ( Oral MAR Unhold 5/11/23 1850)   dextrose (D50W) (25 g/50 mL) IV injection 25 g ( Intravenous MAR Unhold 5/11/23 1850)   glucagon (GLUCAGEN) injection 1 mg ( Intramuscular MAR Unhold 5/11/23 1850)   insulin lispro (HUMALOG/ADMELOG) injection 2-9 Units ( Subcutaneous MAR Unhold 5/11/23 1850)   HYDROmorphone (DILAUDID) injection 0.5 mg ( Intravenous MAR Unhold 5/11/23 1850)   ALPRAZolam (XANAX) tablet 0.25 mg ( Oral MAR Unhold 5/11/23 1850)   ondansetron (ZOFRAN) injection 4 mg ( Intravenous MAR Unhold 5/11/23 1850)   aspirin EC tablet 81 mg ( Oral MAR Unhold 5/11/23 1850)   sodium chloride 0.9 % flush 10 mL (10 mL Intravenous Given 5/11/23 2006)   sodium chloride 0.9 % flush 10 mL ( Intravenous MAR Unhold 5/11/23 1850)   sodium chloride 0.9 % infusion 40 mL ( Intravenous MAR Unhold 5/11/23 1850)   acetaminophen (TYLENOL) tablet 650 mg (has no administration in time range)   aspirin chewable tablet 324 mg (324 mg Oral Given 5/11/23 1333)   sodium chloride 0.9 % infusion (75 mL/hr Intravenous New Bag 5/11/23 1624)         ADDITIONAL ORDERS CONSIDERED BUT NOT ORDERED:  Nothing      PROGRESS, DATA ANALYSIS, CONSULTS, AND MEDICAL DECISION  MAKING    All labs have been independently interpreted by myself.  All radiology studies have been independently interpreted by myself and discussed with radiologist dictating the report.   EKG's independently interpreted by myself.  Discussion below represents my analysis of pertinent findings related to patient's condition, differential diagnosis, treatment plan and final disposition.    I have discussed case with Dr. Simeon, emergency room physician.  He has performed his own bedside examination and agrees with treatment plan.    ED Course as of 05/11/23 2018   Thu May 11, 2023   1144 Patient presents with intermittent exertional shortness of breath for several days.  No overt chest pain.  He does have a history of coronary artery disease.  He is asymptomatic at this time.  Differential diagnoses include but not limited to ACS, pneumonia, COPD, pneumothorax. [EE]   1145 EKG independently interpreted myself.  Time 1125.  Sinus rhythm, 76 bpm.  Normal P/NATALIE.  QRS shows nonspecific IVCD with left axis deviation.  There is ST abnormalities consistent with LVH.  These appear fairly unchanged versus 3/1/2023. [EE]   1235 WBC: 5.93 [EE]   1235 Hemoglobin: 13.5 [EE]   1240 Chest x-ray independently interpreted myself shows no evidence of infiltrate or pneumothorax. [EE]   1253 Creatinine: 1.18 [EE]   1253 Glucose(!): 214 [EE]   1300 HEART Score prior to Troponin 6:  +1 History, +1 EKG, +2 Age, +2 RFs   [EE]   1306 HS Troponin T(!!): 102 [EE]   1323 Discussed case with Dr. Montes, local cardiology.  He agrees to admit the patient.  He will likely need a heart catheterization.  He does ask to add a D-dimer. [EE]   1339 Dr. Montes at bedside.  He requests that we cancel the D-dimer at this time.  He does plan to take the patient for heart catheterization. [EE]   1651 HS Troponin T(!!): 102 [MM]   1651 HS Troponin T(!!): 103 [MM]   1651 BUN(!): 24  Cardiology was consulted and they came down saw the patient in the  emergency department.  The plan is to take the gentleman to cardiac cath. [MM]   1651 He has remained without chest pain or shortness of breath here in the emergency department. [MM]      ED Course User Index  [EE] Shin Banks PA  [MM] Beau Simeon MD       AS OF 20:18 EDT VITALS:    BP - 117/75  HR - 74  TEMP - 97.3 °F (36.3 °C) (Oral)  O2 SATS - 97%        DIAGNOSIS  Final diagnoses:   Exertional dyspnea   History of CAD (coronary artery disease)   Poorly controlled diabetes mellitus         DISPOSITION  Admitted      Dictated utilizing Dragon dictation     Shin Banks PA  05/11/23 2019

## 2023-05-11 NOTE — Clinical Note
First balloon inflation max pressure = 12 yasmani. First balloon inflation duration = 10 seconds. Second inflation of balloon - Max pressure = 18 yasmani. 2nd Inflation of balloon - Duration = 10 seconds. 2nd inflation was done at 17:38 EDT. Third inflation of balloon - Max pressure = 20 yasmani. 3rd Inflation of balloon - Duration = 10 seconds. 3rd inflation was done at 17:39 EDT.

## 2023-05-11 NOTE — Clinical Note
First balloon inflation max pressure = 14 yasmani. First balloon inflation duration = 5 seconds. Second inflation of balloon - Max pressure = 8 yasmani. 2nd Inflation of balloon - Duration = 10 seconds. 2nd inflation was done at 16:59 EDT. The balloon is positioned in the Proximal segment of the vessel.

## 2023-05-11 NOTE — ED NOTES
Pt c/o shortness of breathe since Monday while mowing the lawn; pt reports he had to stop a few times during that time to catch his breathe. Pt reports his chest pain occurs midsternal to the upper neck when breathing in. Pt is Alert and oriented x4.pt denies any further complaints at this time

## 2023-05-11 NOTE — Clinical Note
First balloon inflation max pressure = 22 yasmani. First balloon inflation duration = 30 seconds. Second inflation of balloon - Max pressure = 20 yasmani. 2nd Inflation of balloon - Duration = 10 seconds. 2nd inflation was done at 17:07 EDT. The balloon is positioned in the Proximal segment of the vessel.

## 2023-05-11 NOTE — PLAN OF CARE
Goal Outcome Evaluation:  Plan of Care Reviewed With: patient           Outcome Evaluation: VSS, AOx4 upon admission to unit from ER. Patient came in with chest pain but has no pain at this time and no SOA reported to RN at this time. NPO at this time as a Left heart cath is scheduled for this afternoon. Consent is signed in pt folder. WCTM.

## 2023-05-11 NOTE — Clinical Note
Hemostasis started on the right radial artery. R-Band was used in achieving hemostasis. Radial compression device applied to vessel. Hemostasis achieved successfully. Closure device additional comment: 10cc of air in TR band

## 2023-05-11 NOTE — NURSING NOTE
Patient received one stent to LAD today, right radial approach, 10 cc air in band.Sheath removed at 1802. Will not begin air removal during day shift. Patient will be returning to unit before shift change.

## 2023-05-12 ENCOUNTER — APPOINTMENT (OUTPATIENT)
Dept: CARDIOLOGY | Facility: HOSPITAL | Age: 73
End: 2023-05-12
Payer: MEDICARE

## 2023-05-12 ENCOUNTER — READMISSION MANAGEMENT (OUTPATIENT)
Dept: CALL CENTER | Facility: HOSPITAL | Age: 73
End: 2023-05-12
Payer: MEDICARE

## 2023-05-12 VITALS
RESPIRATION RATE: 18 BRPM | OXYGEN SATURATION: 98 % | BODY MASS INDEX: 25.9 KG/M2 | TEMPERATURE: 97.7 F | SYSTOLIC BLOOD PRESSURE: 148 MMHG | HEIGHT: 67 IN | DIASTOLIC BLOOD PRESSURE: 77 MMHG | WEIGHT: 165 LBS | HEART RATE: 59 BPM

## 2023-05-12 LAB
ACT BLD: 251 SECONDS (ref 82–152)
ACT BLD: 299 SECONDS (ref 82–152)
ACT BLD: 468 SECONDS (ref 82–152)
ACT BLD: 546 SECONDS (ref 82–152)
ANION GAP SERPL CALCULATED.3IONS-SCNC: 8.1 MMOL/L (ref 5–15)
AORTIC DIMENSIONLESS INDEX: 0.6 (DI)
BH CV ECHO LEFT VENTRICLE GLOBAL LONGITUDINAL STRAIN: -11.3 %
BH CV ECHO MEAS - AI P1/2T: 475.3 MSEC
BH CV ECHO MEAS - AO MAX PG: 4.9 MMHG
BH CV ECHO MEAS - AO MEAN PG: 3.1 MMHG
BH CV ECHO MEAS - AO ROOT DIAM: 2.7 CM
BH CV ECHO MEAS - AO V2 MAX: 110.5 CM/SEC
BH CV ECHO MEAS - AO V2 VTI: 26.6 CM
BH CV ECHO MEAS - AVA(I,D): 1.75 CM2
BH CV ECHO MEAS - EDV(CUBED): 223.1 ML
BH CV ECHO MEAS - EDV(MOD-SP2): 128 ML
BH CV ECHO MEAS - EDV(MOD-SP4): 111 ML
BH CV ECHO MEAS - EF(MOD-BP): 49.5 %
BH CV ECHO MEAS - EF(MOD-SP2): 47.7 %
BH CV ECHO MEAS - EF(MOD-SP4): 53.2 %
BH CV ECHO MEAS - ESV(CUBED): 131.3 ML
BH CV ECHO MEAS - ESV(MOD-SP2): 67 ML
BH CV ECHO MEAS - ESV(MOD-SP4): 52 ML
BH CV ECHO MEAS - FS: 16.2 %
BH CV ECHO MEAS - IVS/LVPW: 1.01 CM
BH CV ECHO MEAS - IVSD: 0.99 CM
BH CV ECHO MEAS - LAT PEAK E' VEL: 5 CM/SEC
BH CV ECHO MEAS - LV DIASTOLIC VOL/BSA (35-75): 59.6 CM2
BH CV ECHO MEAS - LV MASS(C)D: 245.7 GRAMS
BH CV ECHO MEAS - LV MAX PG: 2.09 MMHG
BH CV ECHO MEAS - LV MEAN PG: 1.08 MMHG
BH CV ECHO MEAS - LV SYSTOLIC VOL/BSA (12-30): 27.9 CM2
BH CV ECHO MEAS - LV V1 MAX: 72.3 CM/SEC
BH CV ECHO MEAS - LV V1 VTI: 15.3 CM
BH CV ECHO MEAS - LVIDD: 6.1 CM
BH CV ECHO MEAS - LVIDS: 5.1 CM
BH CV ECHO MEAS - LVOT AREA: 3 CM2
BH CV ECHO MEAS - LVOT DIAM: 1.97 CM
BH CV ECHO MEAS - LVPWD: 0.98 CM
BH CV ECHO MEAS - MED PEAK E' VEL: 4.6 CM/SEC
BH CV ECHO MEAS - MR MAX PG: 85.7 MMHG
BH CV ECHO MEAS - MR MAX VEL: 462.9 CM/SEC
BH CV ECHO MEAS - MV A DUR: 0.07 SEC
BH CV ECHO MEAS - MV A MAX VEL: 102.3 CM/SEC
BH CV ECHO MEAS - MV DEC SLOPE: 537.9 CM/SEC2
BH CV ECHO MEAS - MV DEC TIME: 161 MSEC
BH CV ECHO MEAS - MV E MAX VEL: 87.8 CM/SEC
BH CV ECHO MEAS - MV E/A: 0.86
BH CV ECHO MEAS - MV MAX PG: 4.6 MMHG
BH CV ECHO MEAS - MV MEAN PG: 1.7 MMHG
BH CV ECHO MEAS - MV P1/2T: 51.9 MSEC
BH CV ECHO MEAS - MV V2 VTI: 28.8 CM
BH CV ECHO MEAS - MVA(P1/2T): 4.2 CM2
BH CV ECHO MEAS - MVA(VTI): 1.62 CM2
BH CV ECHO MEAS - PA ACC TIME: 0.15 SEC
BH CV ECHO MEAS - PA PR(ACCEL): 13.6 MMHG
BH CV ECHO MEAS - PA V2 MAX: 78.5 CM/SEC
BH CV ECHO MEAS - PULM A REVS DUR: 0.14 SEC
BH CV ECHO MEAS - PULM A REVS VEL: 31 CM/SEC
BH CV ECHO MEAS - PULM DIAS VEL: 51.9 CM/SEC
BH CV ECHO MEAS - PULM S/D: 1.12
BH CV ECHO MEAS - PULM SYS VEL: 58.2 CM/SEC
BH CV ECHO MEAS - RAP SYSTOLE: 3 MMHG
BH CV ECHO MEAS - RV MAX PG: 1.2 MMHG
BH CV ECHO MEAS - RV V1 MAX: 54.8 CM/SEC
BH CV ECHO MEAS - RV V1 VTI: 13.3 CM
BH CV ECHO MEAS - SI(MOD-SP2): 32.7 ML/M2
BH CV ECHO MEAS - SI(MOD-SP4): 31.7 ML/M2
BH CV ECHO MEAS - SV(LVOT): 46.6 ML
BH CV ECHO MEAS - SV(MOD-SP2): 61 ML
BH CV ECHO MEAS - SV(MOD-SP4): 59 ML
BH CV ECHO MEAS - TAPSE (>1.6): 2 CM
BH CV ECHO MEASUREMENTS AVERAGE E/E' RATIO: 18.29
BH CV XLRA - RV BASE: 3.2 CM
BH CV XLRA - TDI S': 10 CM/SEC
BUN SERPL-MCNC: 18 MG/DL (ref 8–23)
BUN/CREAT SERPL: 21.4 (ref 7–25)
CALCIUM SPEC-SCNC: 8.8 MG/DL (ref 8.6–10.5)
CHLORIDE SERPL-SCNC: 108 MMOL/L (ref 98–107)
CHOLEST SERPL-MCNC: 122 MG/DL (ref 0–200)
CO2 SERPL-SCNC: 25.9 MMOL/L (ref 22–29)
CREAT SERPL-MCNC: 0.84 MG/DL (ref 0.76–1.27)
DEPRECATED RDW RBC AUTO: 42.4 FL (ref 37–54)
EGFRCR SERPLBLD CKD-EPI 2021: 92.7 ML/MIN/1.73
ERYTHROCYTE [DISTWIDTH] IN BLOOD BY AUTOMATED COUNT: 13.7 % (ref 12.3–15.4)
GLUCOSE BLDC GLUCOMTR-MCNC: 112 MG/DL (ref 70–130)
GLUCOSE SERPL-MCNC: 127 MG/DL (ref 65–99)
HBA1C MFR BLD: 7.5 % (ref 4.8–5.6)
HCT VFR BLD AUTO: 39.5 % (ref 37.5–51)
HDLC SERPL-MCNC: 36 MG/DL (ref 40–60)
HGB BLD-MCNC: 13.3 G/DL (ref 13–17.7)
LDLC SERPL CALC-MCNC: 51 MG/DL (ref 0–100)
LDLC/HDLC SERPL: 1.19 {RATIO}
LEFT ATRIUM VOLUME INDEX: 32.7 ML/M2
MAXIMAL PREDICTED HEART RATE: 148 BPM
MCH RBC QN AUTO: 28.5 PG (ref 26.6–33)
MCHC RBC AUTO-ENTMCNC: 33.7 G/DL (ref 31.5–35.7)
MCV RBC AUTO: 84.6 FL (ref 79–97)
PLATELET # BLD AUTO: 180 10*3/MM3 (ref 140–450)
PMV BLD AUTO: 10.1 FL (ref 6–12)
POTASSIUM SERPL-SCNC: 4.1 MMOL/L (ref 3.5–5.2)
QT INTERVAL: 404 MS
QT INTERVAL: 432 MS
RBC # BLD AUTO: 4.67 10*6/MM3 (ref 4.14–5.8)
SODIUM SERPL-SCNC: 142 MMOL/L (ref 136–145)
STRESS TARGET HR: 126 BPM
TRIGL SERPL-MCNC: 216 MG/DL (ref 0–150)
TROPONIN T SERPL HS-MCNC: 127 NG/L
VLDLC SERPL-MCNC: 35 MG/DL (ref 5–40)
WBC NRBC COR # BLD: 7.67 10*3/MM3 (ref 3.4–10.8)

## 2023-05-12 PROCEDURE — 93306 TTE W/DOPPLER COMPLETE: CPT

## 2023-05-12 PROCEDURE — 93356 MYOCRD STRAIN IMG SPCKL TRCK: CPT

## 2023-05-12 PROCEDURE — 93010 ELECTROCARDIOGRAM REPORT: CPT | Performed by: INTERNAL MEDICINE

## 2023-05-12 PROCEDURE — 93005 ELECTROCARDIOGRAM TRACING: CPT | Performed by: STUDENT IN AN ORGANIZED HEALTH CARE EDUCATION/TRAINING PROGRAM

## 2023-05-12 PROCEDURE — 36415 COLL VENOUS BLD VENIPUNCTURE: CPT | Performed by: STUDENT IN AN ORGANIZED HEALTH CARE EDUCATION/TRAINING PROGRAM

## 2023-05-12 PROCEDURE — 82948 REAGENT STRIP/BLOOD GLUCOSE: CPT

## 2023-05-12 PROCEDURE — 85027 COMPLETE CBC AUTOMATED: CPT | Performed by: STUDENT IN AN ORGANIZED HEALTH CARE EDUCATION/TRAINING PROGRAM

## 2023-05-12 PROCEDURE — 80048 BASIC METABOLIC PNL TOTAL CA: CPT | Performed by: STUDENT IN AN ORGANIZED HEALTH CARE EDUCATION/TRAINING PROGRAM

## 2023-05-12 PROCEDURE — 93356 MYOCRD STRAIN IMG SPCKL TRCK: CPT | Performed by: INTERNAL MEDICINE

## 2023-05-12 PROCEDURE — G0378 HOSPITAL OBSERVATION PER HR: HCPCS

## 2023-05-12 PROCEDURE — 83036 HEMOGLOBIN GLYCOSYLATED A1C: CPT | Performed by: STUDENT IN AN ORGANIZED HEALTH CARE EDUCATION/TRAINING PROGRAM

## 2023-05-12 PROCEDURE — 80061 LIPID PANEL: CPT | Performed by: STUDENT IN AN ORGANIZED HEALTH CARE EDUCATION/TRAINING PROGRAM

## 2023-05-12 PROCEDURE — 93306 TTE W/DOPPLER COMPLETE: CPT | Performed by: INTERNAL MEDICINE

## 2023-05-12 PROCEDURE — 84484 ASSAY OF TROPONIN QUANT: CPT | Performed by: INTERNAL MEDICINE

## 2023-05-12 RX ORDER — ATORVASTATIN CALCIUM 20 MG/1
40 TABLET, FILM COATED ORAL NIGHTLY
Status: DISCONTINUED | OUTPATIENT
Start: 2023-05-12 | End: 2023-05-12 | Stop reason: HOSPADM

## 2023-05-12 RX ORDER — GABAPENTIN 300 MG/1
600 CAPSULE ORAL
Status: DISCONTINUED | OUTPATIENT
Start: 2023-05-12 | End: 2023-05-12 | Stop reason: HOSPADM

## 2023-05-12 RX ORDER — ASPIRIN 81 MG/1
81 TABLET ORAL DAILY
Qty: 90 TABLET | Refills: 3 | Status: SHIPPED | OUTPATIENT
Start: 2023-05-13

## 2023-05-12 RX ORDER — ATORVASTATIN CALCIUM 40 MG/1
40 TABLET, FILM COATED ORAL NIGHTLY
Qty: 90 TABLET | Refills: 3 | Status: SHIPPED | OUTPATIENT
Start: 2023-05-12

## 2023-05-12 RX ADMIN — LOSARTAN POTASSIUM 100 MG: 100 TABLET, FILM COATED ORAL at 09:29

## 2023-05-12 RX ADMIN — RANOLAZINE 500 MG: 500 TABLET, FILM COATED, EXTENDED RELEASE ORAL at 09:28

## 2023-05-12 RX ADMIN — LINAGLIPTIN 5 MG: 5 TABLET, FILM COATED ORAL at 09:42

## 2023-05-12 RX ADMIN — LEVOTHYROXINE SODIUM 75 MCG: 0.07 TABLET ORAL at 04:54

## 2023-05-12 RX ADMIN — CLOPIDOGREL BISULFATE 75 MG: 75 TABLET, FILM COATED ORAL at 09:31

## 2023-05-12 RX ADMIN — Medication 10 ML: at 09:43

## 2023-05-12 RX ADMIN — EMPAGLIFLOZIN 25 MG: 25 TABLET, FILM COATED ORAL at 09:42

## 2023-05-12 RX ADMIN — ASPIRIN 81 MG: 81 TABLET, COATED ORAL at 09:29

## 2023-05-12 NOTE — PLAN OF CARE
Goal Outcome Evaluation:              Outcome Evaluation: A&Ox4, vss, ra, denies pain. F/U EKG abnormal, patient denies SOA or chest pain, cardiology aware and will round this am.

## 2023-05-12 NOTE — OUTREACH NOTE
Prep Survey    Flowsheet Row Responses   [facility] facility patient discharged from? [facility]   Is LACE score < 7 ? Yes   Eligibility [facility]   Date of Admission 05/11/23   Date of Discharge 05/12/23   Discharge Disposition Home or Self Care   Discharge diagnosis Exertional dyspnea   Does the patient have one of the following disease processes/diagnoses(primary or secondary)? Other   Does the patient have Home health ordered? No   Is there a DME ordered? No   Prep survey completed? Yes          SYLVIA GILES - Registered Nurse         No

## 2023-05-12 NOTE — CASE MANAGEMENT/SOCIAL WORK
Case Management Discharge Note      Final Note: Home, no additional CCP needs.         Selected Continued Care - Admitted Since 5/11/2023     Destination    No services have been selected for the patient.              Durable Medical Equipment    No services have been selected for the patient.              Dialysis/Infusion    No services have been selected for the patient.              Home Medical Care    No services have been selected for the patient.              Therapy    No services have been selected for the patient.              Community Resources    No services have been selected for the patient.              Community & DME    No services have been selected for the patient.                       Final Discharge Disposition Code: 01 - home or self-care

## 2023-05-12 NOTE — PLAN OF CARE
Goal Outcome Evaluation:  Plan of Care Reviewed With: patient        Progress: improving  Outcome Evaluation: DC order placed. Plan is to have second stent placed monday.

## 2023-05-15 ENCOUNTER — TRANSITIONAL CARE MANAGEMENT TELEPHONE ENCOUNTER (OUTPATIENT)
Dept: CALL CENTER | Facility: HOSPITAL | Age: 73
End: 2023-05-15
Payer: MEDICARE

## 2023-05-15 ENCOUNTER — HOSPITAL ENCOUNTER (OUTPATIENT)
Facility: HOSPITAL | Age: 73
Setting detail: HOSPITAL OUTPATIENT SURGERY
Discharge: HOME | End: 2023-05-15
Attending: STUDENT IN AN ORGANIZED HEALTH CARE EDUCATION/TRAINING PROGRAM | Admitting: STUDENT IN AN ORGANIZED HEALTH CARE EDUCATION/TRAINING PROGRAM
Payer: MEDICARE

## 2023-05-15 VITALS
WEIGHT: 163 LBS | TEMPERATURE: 97.5 F | SYSTOLIC BLOOD PRESSURE: 139 MMHG | HEIGHT: 67 IN | BODY MASS INDEX: 25.58 KG/M2 | DIASTOLIC BLOOD PRESSURE: 84 MMHG | HEART RATE: 63 BPM | OXYGEN SATURATION: 99 % | RESPIRATION RATE: 20 BRPM

## 2023-05-15 DIAGNOSIS — I25.10 CAD, MULTIPLE VESSEL: ICD-10-CM

## 2023-05-15 LAB — GLUCOSE BLDC GLUCOMTR-MCNC: 128 MG/DL (ref 70–130)

## 2023-05-15 PROCEDURE — C1769 GUIDE WIRE: HCPCS | Performed by: STUDENT IN AN ORGANIZED HEALTH CARE EDUCATION/TRAINING PROGRAM

## 2023-05-15 PROCEDURE — C1874 STENT, COATED/COV W/DEL SYS: HCPCS | Performed by: STUDENT IN AN ORGANIZED HEALTH CARE EDUCATION/TRAINING PROGRAM

## 2023-05-15 PROCEDURE — S0260 H&P FOR SURGERY: HCPCS | Performed by: STUDENT IN AN ORGANIZED HEALTH CARE EDUCATION/TRAINING PROGRAM

## 2023-05-15 PROCEDURE — C1725 CATH, TRANSLUMIN NON-LASER: HCPCS | Performed by: STUDENT IN AN ORGANIZED HEALTH CARE EDUCATION/TRAINING PROGRAM

## 2023-05-15 PROCEDURE — C1887 CATHETER, GUIDING: HCPCS | Performed by: STUDENT IN AN ORGANIZED HEALTH CARE EDUCATION/TRAINING PROGRAM

## 2023-05-15 PROCEDURE — 92978 ENDOLUMINL IVUS OCT C 1ST: CPT | Performed by: STUDENT IN AN ORGANIZED HEALTH CARE EDUCATION/TRAINING PROGRAM

## 2023-05-15 PROCEDURE — 25010000002 MIDAZOLAM PER 1 MG: Performed by: STUDENT IN AN ORGANIZED HEALTH CARE EDUCATION/TRAINING PROGRAM

## 2023-05-15 PROCEDURE — 25010000002 FENTANYL CITRATE (PF) 50 MCG/ML SOLUTION: Performed by: STUDENT IN AN ORGANIZED HEALTH CARE EDUCATION/TRAINING PROGRAM

## 2023-05-15 PROCEDURE — 92928 PRQ TCAT PLMT NTRAC ST 1 LES: CPT | Performed by: STUDENT IN AN ORGANIZED HEALTH CARE EDUCATION/TRAINING PROGRAM

## 2023-05-15 PROCEDURE — 92943 PRQ TRLUML REVSC CH OCC ANT: CPT | Performed by: STUDENT IN AN ORGANIZED HEALTH CARE EDUCATION/TRAINING PROGRAM

## 2023-05-15 PROCEDURE — C1894 INTRO/SHEATH, NON-LASER: HCPCS | Performed by: STUDENT IN AN ORGANIZED HEALTH CARE EDUCATION/TRAINING PROGRAM

## 2023-05-15 PROCEDURE — 82948 REAGENT STRIP/BLOOD GLUCOSE: CPT

## 2023-05-15 PROCEDURE — 85347 COAGULATION TIME ACTIVATED: CPT

## 2023-05-15 PROCEDURE — C9607 PERC D-E COR REVASC CHRO SIN: HCPCS | Performed by: STUDENT IN AN ORGANIZED HEALTH CARE EDUCATION/TRAINING PROGRAM

## 2023-05-15 PROCEDURE — C1753 CATH, INTRAVAS ULTRASOUND: HCPCS | Performed by: STUDENT IN AN ORGANIZED HEALTH CARE EDUCATION/TRAINING PROGRAM

## 2023-05-15 PROCEDURE — 25510000001 IOPAMIDOL PER 1 ML: Performed by: STUDENT IN AN ORGANIZED HEALTH CARE EDUCATION/TRAINING PROGRAM

## 2023-05-15 PROCEDURE — C9600 PERC DRUG-EL COR STENT SING: HCPCS | Performed by: STUDENT IN AN ORGANIZED HEALTH CARE EDUCATION/TRAINING PROGRAM

## 2023-05-15 PROCEDURE — 25010000002 HEPARIN (PORCINE) PER 1000 UNITS: Performed by: STUDENT IN AN ORGANIZED HEALTH CARE EDUCATION/TRAINING PROGRAM

## 2023-05-15 DEVICE — XIENCE SKYPOINT™ EVEROLIMUS ELUTING CORONARY STENT SYSTEM 3.50 MM X 33 MM / RAPID-EXCHANGE
Type: IMPLANTABLE DEVICE | Site: CORONARY | Status: FUNCTIONAL
Brand: XIENCE SKYPOINT™

## 2023-05-15 DEVICE — XIENCE SKYPOINT™ EVEROLIMUS ELUTING CORONARY STENT SYSTEM 2.50 MM X 38 MM / RAPID-EXCHANGE
Type: IMPLANTABLE DEVICE | Site: CORONARY | Status: FUNCTIONAL
Brand: XIENCE SKYPOINT™

## 2023-05-15 DEVICE — XIENCE SKYPOINT™ EVEROLIMUS ELUTING CORONARY STENT SYSTEM 2.50 MM X 18 MM / RAPID-EXCHANGE
Type: IMPLANTABLE DEVICE | Site: CORONARY | Status: FUNCTIONAL
Brand: XIENCE SKYPOINT™

## 2023-05-15 RX ORDER — SODIUM CHLORIDE 9 MG/ML
75 INJECTION, SOLUTION INTRAVENOUS CONTINUOUS
Status: DISCONTINUED | OUTPATIENT
Start: 2023-05-15 | End: 2023-05-15 | Stop reason: HOSPADM

## 2023-05-15 RX ORDER — ACETAMINOPHEN 325 MG/1
650 TABLET ORAL EVERY 4 HOURS PRN
Status: DISCONTINUED | OUTPATIENT
Start: 2023-05-15 | End: 2023-05-15 | Stop reason: HOSPADM

## 2023-05-15 RX ORDER — SODIUM CHLORIDE 0.9 % (FLUSH) 0.9 %
10 SYRINGE (ML) INJECTION AS NEEDED
Status: DISCONTINUED | OUTPATIENT
Start: 2023-05-15 | End: 2023-05-15 | Stop reason: HOSPADM

## 2023-05-15 RX ORDER — FENTANYL CITRATE 50 UG/ML
INJECTION, SOLUTION INTRAMUSCULAR; INTRAVENOUS
Status: DISCONTINUED | OUTPATIENT
Start: 2023-05-15 | End: 2023-05-15 | Stop reason: HOSPADM

## 2023-05-15 RX ORDER — HEPARIN SODIUM 1000 [USP'U]/ML
INJECTION, SOLUTION INTRAVENOUS; SUBCUTANEOUS
Status: DISCONTINUED | OUTPATIENT
Start: 2023-05-15 | End: 2023-05-15 | Stop reason: HOSPADM

## 2023-05-15 RX ORDER — VERAPAMIL HYDROCHLORIDE 2.5 MG/ML
INJECTION, SOLUTION INTRAVENOUS
Status: DISCONTINUED | OUTPATIENT
Start: 2023-05-15 | End: 2023-05-15 | Stop reason: HOSPADM

## 2023-05-15 RX ORDER — SODIUM CHLORIDE 0.9 % (FLUSH) 0.9 %
10 SYRINGE (ML) INJECTION EVERY 12 HOURS SCHEDULED
Status: DISCONTINUED | OUTPATIENT
Start: 2023-05-15 | End: 2023-05-15 | Stop reason: HOSPADM

## 2023-05-15 RX ORDER — CLOPIDOGREL BISULFATE 75 MG/1
TABLET ORAL
Status: DISCONTINUED | OUTPATIENT
Start: 2023-05-15 | End: 2023-05-15 | Stop reason: HOSPADM

## 2023-05-15 RX ORDER — SODIUM CHLORIDE 9 MG/ML
40 INJECTION, SOLUTION INTRAVENOUS AS NEEDED
Status: DISCONTINUED | OUTPATIENT
Start: 2023-05-15 | End: 2023-05-15 | Stop reason: HOSPADM

## 2023-05-15 RX ORDER — MIDAZOLAM HYDROCHLORIDE 1 MG/ML
INJECTION INTRAMUSCULAR; INTRAVENOUS
Status: DISCONTINUED | OUTPATIENT
Start: 2023-05-15 | End: 2023-05-15 | Stop reason: HOSPADM

## 2023-05-15 RX ORDER — NICARDIPINE HYDROCHLORIDE 2.5 MG/ML
INJECTION INTRAVENOUS
Status: DISCONTINUED | OUTPATIENT
Start: 2023-05-15 | End: 2023-05-15 | Stop reason: HOSPADM

## 2023-05-15 RX ORDER — LIDOCAINE HYDROCHLORIDE 20 MG/ML
INJECTION, SOLUTION INFILTRATION; PERINEURAL
Status: DISCONTINUED | OUTPATIENT
Start: 2023-05-15 | End: 2023-05-15 | Stop reason: HOSPADM

## 2023-05-15 RX ORDER — ASPIRIN 81 MG/1
TABLET, CHEWABLE ORAL
Status: DISCONTINUED | OUTPATIENT
Start: 2023-05-15 | End: 2023-05-15 | Stop reason: HOSPADM

## 2023-05-15 RX ADMIN — ACETAMINOPHEN 650 MG: 325 TABLET, FILM COATED ORAL at 17:24

## 2023-05-15 RX ADMIN — SODIUM CHLORIDE 75 ML/HR: 9 INJECTION, SOLUTION INTRAVENOUS at 12:22

## 2023-05-15 NOTE — CONSULTS
Met with patient, discussed benefits of cardiac rehab. Provided phase II information along with the contact information for cardiac rehab here at [facility]. Patient declined.

## 2023-05-15 NOTE — DISCHARGE INSTRUCTIONS
[facility]  [address] [place] [address]    Coronary Angioplasty with or without  Stent (Radial Approach) After Care    Refer to this sheet in the next few weeks. These instructions provide you with information on caring for yourself after your procedure. Your health care provider may also give you more specific instructions. Your treatment has been planned according to current medical practices, but problems sometimes occur. Call your health care provider if you have any problems or questions after your procedure.       Home Care Instructions:  You may shower the day after the procedure. Remove the bandage (dressing) and gently wash the site with plain soap and water. Gently pat the site dry. You may apply a band aid daily for 2 days if desired.    Do not apply powder or lotion to the site.  Do not submerge the affected site in water for 3 to 5 days or until the site is completely healed.   Do not flex or bend at the wrist with affected arm for 24 hours.  Do not lift, push or pull anything over 5 pounds for 5 days after your procedure or as directed by your physician.  For a reference, a gallon of milk weighs 8 pounds.    Do not operate machinery or power tools for 24 hours.  Inspect the site at least twice daily. You may notice some bruising at the site and it may be tender for 1 to 2 weeks.     Increase your fluid intake for the next 2 days.    Keep arm elevated for 24 hours.  For the remainder of the day, keep your arm in the “Pledge of Allegiance” position when up and about.    Limit your activity for the next 48 hours and avoid strenuous activity as directed by your physician.   Cardiac Rehab may or may not be ordered.  Please consult with your physician  You may drive 24 hours after the procedure unless otherwise instructed by your caregiver.  A responsible adult should be with you for the first 24 hours after you arrive home.   Do not make any important legal decisions or sign legal  papers for 24 hours. Do not drink alcohol for 24 hours.    Take medicines only as directed by your health care provider.  Blood thinners may be prescribed after your procedure to improve blood flow through the stent.    Metformin or any medications containing Metformin should not be taken for 48 hours after your procedure.    Eat a heart-healthy diet. This should include plenty of fresh fruits and vegetables. Meat should be lean cuts. Avoid the following types of food:    Food that is high in salt.    Canned or highly processed food.    Food that is high in saturated fat or sugar.    Fried food.    Make any other lifestyle changes recommended by your health care provider. This may include:    Not using any tobacco products including cigarettes, chewing tobacco, or electronic cigarettes.   Managing your weight.    Getting regular exercise.    Managing your blood pressure.    Limiting your alcohol intake.    Managing other health problems, such as diabetes.    If you need an MRI after your heart stent was placed, be sure to tell the health care provider who orders the MRI that you have a heart stent.    Keep all follow-up visits as directed by your health care provider.      Call Your Doctor If:    You have unusual pain at the radial/ulnar (wrist) site.  You have redness, warmth, swelling, or pain at the radial/ulnar (wrist) site.  You have drainage (other than a small amount of blood on the dressing).  `You have chills or a fever > 101.  Your arm becomes pale or dark, cool, tingly, or numb.  You develop chest pain, shortness of breath, feel faint or pass out.    You have heavy bleeding from the site.  If you do, hold pressure on the site for 20 minutes.  If the bleeding stops, apply a fresh bandage and call your cardiologist.  However, if you continue to have bleeding, maintain pressure and call 911.    You have any symptoms of a stroke.  Remember BE FAST  B-balance. Sudden trouble walking or loss of  balance.  E-eyes.  Sudden changes in how you see or a sudden onset of a very bad headache.   F-face. Sudden weakness or loss of feeling of the face or facial droop on one side.   A-arms Sudden weakness or numbness in one arm. One arm drifts down if they are both held out in front of you. This happens suddenly and usually on one side of the body.   S-speech.  Sudden trouble speaking, slurred speech or trouble understanding what people are saying.   T-time  Time to call emergency services.  Write down the symptoms and the time they started.

## 2023-05-15 NOTE — Clinical Note
First balloon inflation max pressure = 18 yasmani. First balloon inflation duration = 8 seconds. Second inflation of balloon - Max pressure = 18 yasmani. 2nd Inflation of balloon - Duration = 5 seconds. 2nd inflation was done at 12:52 EDT.

## 2023-05-15 NOTE — H&P
Date of Hospital Visit: 05/15/23  Encounter Provider: Petr Asher MD  Place of Service: [facility] CARDIOLOGY  Patient Name: Jesus Cuba  :1950  [id number]    Chief complaint:  Chest pain    History of Present Illness:  73-year-old man with CAD status post prior PCI with stenting to mid LAD, first diagonal, PDA, hypertension, hyperlipidemia, diabetes who presented with progressive dyspnea on exertion and underwent LHC on 23 which revealed severe multivessel disease.  Revascularization options including CABG versus PCI were discussed given the severity of disease however patient was adamant about not wanting CABG so PCI was performed.  He underwent successful intravascular sound guided PCI with a 3.0 x 38mm sinus Drainage stent to the in-stent restenosis of the LAD (postdilated with a 4.0 mm NC).  He underwent balloon angioplasty to severe in-stent restenosis of the first diagonal with final kissing balloon inflations.  Patient had residual severe ramus disease, moderate left main disease with an MLA of 6.24 mm² and a proximal RCA .  Decision at that time was made to pursue staged PCI to the ramus and attempt PCI of the proximal RCA  as his left main is borderline.  If he were to require left main intervention, ideally would want the RCA to be open in order to make the procedure lower risk.      Past Medical History:   Diagnosis Date   • CAD (coronary artery disease)    • Diabetes mellitus    • Dyslipidemia    • Hyperlipidemia    • Hypertension    • Hypothyroidism    • Myocardial infarction     NSTEMI       Past Surgical History:   Procedure Laterality Date   • CARDIAC CATHETERIZATION  2018   • CARDIAC CATHETERIZATION N/A 2023    Procedure: Left Heart Cath;  Surgeon: Petr Asher MD;  Location: [facility] CATH INVASIVE LOCATION;  Service: Cardiovascular;  Laterality: N/A;   • CARDIAC CATHETERIZATION N/A 2023    Procedure: Coronary angiography;  Surgeon:  Petr Asher MD;  Location:  [facility] CATH INVASIVE LOCATION;  Service: Cardiovascular;  Laterality: N/A;   • CARDIAC CATHETERIZATION N/A 05/11/2023    Procedure: Percutaneous Coronary Intervention;  Surgeon: Petr Asher MD;  Location:  [facility] CATH INVASIVE LOCATION;  Service: Cardiovascular;  Laterality: N/A;   • CARDIAC CATHETERIZATION N/A 05/11/2023    Procedure: Stent COREY coronary;  Surgeon: Petr Asher MD;  Location:  [facility] CATH INVASIVE LOCATION;  Service: Cardiovascular;  Laterality: N/A;   • CORONARY ANGIOPLASTY WITH STENT PLACEMENT      4 stents  over several years - first in 2002   • INTERVENTIONAL RADIOLOGY PROCEDURE N/A 05/11/2023    Procedure: Intravascular Ultrasound;  Surgeon: Petr Asher MD;  Location:  [facility] CATH INVASIVE LOCATION;  Service: Cardiovascular;  Laterality: N/A;   • SKIN BIOPSY      shoulder left       Medications Prior to Admission   Medication Sig Dispense Refill Last Dose   • aspirin 81 MG EC tablet Take 1 tablet by mouth Daily. 90 tablet 3 5/14/2023   • atorvastatin (LIPITOR) 40 MG tablet Take 1 tablet by mouth Every Night. 90 tablet 3 5/14/2023   • Canagliflozin (Invokana) 300 MG tablet tablet Take 1 tablet by mouth Daily. 90 tablet 1 5/14/2023   • celecoxib (CeleBREX) 200 MG capsule Take 1 capsule by mouth Daily. (Patient taking differently: Take 1 capsule by mouth Daily As Needed.) 30 capsule 5 Past Week   • clopidogrel (PLAVIX) 75 MG tablet Take 1 tablet by mouth daily. 90 tablet 1 5/14/2023   • gabapentin (NEURONTIN) 300 MG capsule TAKE 1 CAPSULE THREE TIMES DAILY AS NEEDED FOR PAIN (Patient taking differently: Take 2 capsules by mouth Daily With Dinner.) 270 capsule 1 5/14/2023   • irbesartan (AVAPRO) 300 MG tablet TAKE 1 TABLET EVERY DAY (Patient taking differently: Take 1 tablet by mouth Daily.) 90 tablet 3 5/14/2023   • isosorbide mononitrate (IMDUR) 30 MG 24 hr tablet Take 1 tablet by mouth Daily.  1 5/14/2023   • levothyroxine (SYNTHROID, LEVOTHROID) 75 MCG tablet TAKE 1  TABLET EVERY DAY (Patient taking differently: Take 1 tablet by mouth Every Morning.) 90 tablet 3 5/14/2023   • metFORMIN (GLUCOPHAGE) 1000 MG tablet TAKE 1 TABLET TWICE DAILY WITH MEALS (Patient taking differently: Take 1 tablet by mouth 2 (Two) Times a Day With Meals.) 180 tablet 1 5/14/2023   • metoprolol succinate XL (TOPROL-XL) 25 MG 24 hr tablet TAKE 1/2 TABLET EVERY DAY (Patient taking differently: Take 12.5 mg by mouth Daily.) 45 tablet 2 5/14/2023   • ranolazine (RANEXA) 500 MG 12 hr tablet TAKE 1 TABLET TWICE DAILY (Patient taking differently: Take 1 tablet by mouth 2 (Two) Times a Day.) 180 tablet 1 5/14/2023   • SITagliptin (Januvia) 100 MG tablet Take 1 tablet by mouth Daily. 90 tablet 2 5/14/2023   • NITROSTAT 0.4 MG SL tablet DISSOLVE ONE TABLET UNDER THE TONGUE AS NEEDED (Patient taking differently: Place 1 tablet under the tongue Every 5 (Five) Minutes As Needed.) 25 tablet 0 More than a month       Current Meds  No current facility-administered medications on file prior to encounter.     Current Outpatient Medications on File Prior to Encounter   Medication Sig Dispense Refill   • aspirin 81 MG EC tablet Take 1 tablet by mouth Daily. 90 tablet 3   • atorvastatin (LIPITOR) 40 MG tablet Take 1 tablet by mouth Every Night. 90 tablet 3   • Canagliflozin (Invokana) 300 MG tablet tablet Take 1 tablet by mouth Daily. 90 tablet 1   • celecoxib (CeleBREX) 200 MG capsule Take 1 capsule by mouth Daily. (Patient taking differently: Take 1 capsule by mouth Daily As Needed.) 30 capsule 5   • clopidogrel (PLAVIX) 75 MG tablet Take 1 tablet by mouth daily. 90 tablet 1   • gabapentin (NEURONTIN) 300 MG capsule TAKE 1 CAPSULE THREE TIMES DAILY AS NEEDED FOR PAIN (Patient taking differently: Take 2 capsules by mouth Daily With Dinner.) 270 capsule 1   • irbesartan (AVAPRO) 300 MG tablet TAKE 1 TABLET EVERY DAY (Patient taking differently: Take 1 tablet by mouth Daily.) 90 tablet 3   • isosorbide mononitrate (IMDUR) 30  MG 24 hr tablet Take 1 tablet by mouth Daily.  1   • levothyroxine (SYNTHROID, LEVOTHROID) 75 MCG tablet TAKE 1 TABLET EVERY DAY (Patient taking differently: Take 1 tablet by mouth Every Morning.) 90 tablet 3   • metFORMIN (GLUCOPHAGE) 1000 MG tablet TAKE 1 TABLET TWICE DAILY WITH MEALS (Patient taking differently: Take 1 tablet by mouth 2 (Two) Times a Day With Meals.) 180 tablet 1   • metoprolol succinate XL (TOPROL-XL) 25 MG 24 hr tablet TAKE 1/2 TABLET EVERY DAY (Patient taking differently: Take 12.5 mg by mouth Daily.) 45 tablet 2   • ranolazine (RANEXA) 500 MG 12 hr tablet TAKE 1 TABLET TWICE DAILY (Patient taking differently: Take 1 tablet by mouth 2 (Two) Times a Day.) 180 tablet 1   • SITagliptin (Januvia) 100 MG tablet Take 1 tablet by mouth Daily. 90 tablet 2   • NITROSTAT 0.4 MG SL tablet DISSOLVE ONE TABLET UNDER THE TONGUE AS NEEDED (Patient taking differently: Place 1 tablet under the tongue Every 5 (Five) Minutes As Needed.) 25 tablet 0   • [DISCONTINUED] meloxicam (MOBIC) 15 MG tablet Take 1 tablet by mouth Daily.         Social History     Socioeconomic History   • Marital status: Single   Tobacco Use   • Smoking status: Never   • Smokeless tobacco: Never   Vaping Use   • Vaping Use: Never used   Substance and Sexual Activity   • Alcohol use: Yes     Alcohol/week: 2.0 standard drinks     Types: 2 Shots of liquor per week     Comment: 2 drinks per week   • Drug use: Never   • Sexual activity: Yes     Partners: Female       Family Hx: Non-contributory    REVIEW OF SYSTEMS:   ROS was performed and is negative except as outlined in HPI     REVIEW OF SYSTEMS:   CONSTITUTIONAL: No weight loss, fever, chills, weakness or fatigue.   HEENT: Eyes: No visual loss, blurred vision, double vision or yellow sclerae. Ears, Nose, Throat: No hearing loss, sneezing, congestion, runny nose or sore throat.   SKIN: No rash or itching.     RESPIRATORY: No shortness of breath, hemoptysis, cough or sputum.  "  GASTROINTESTINAL: No anorexia, nausea, vomiting or diarrhea. No abdominal pain, bright red blood per rectum or melena.  NEUROLOGICAL: No headache, dizziness, syncope, paralysis, numbness or tingling in the extremities.  MUSCULOSKELETAL: No muscle, back pain, joint pain or stiffness.   HEMATOLOGIC: No anemia, bleeding or bruising.   LYMPHATICS: No enlarged nodes.  PSYCHIATRIC: No history of depression, anxiety, hallucinations.   ENDOCRINOLOGIC: No reports of sweating, cold or heat intolerance. No polyuria or polydipsia.        Objective:     Vitals:    05/15/23 0945   BP: 177/72   BP Location: Right arm   Patient Position: Lying   Pulse: 60   Resp: 16   Temp: 97.5 °F (36.4 °C)   TempSrc: Oral   SpO2: 100%   Weight: 73.9 kg (163 lb)   Height: 170.2 cm (67\")     Body mass index is 25.53 kg/m².  Flowsheet Rows    Flowsheet Row First Filed Value   Admission Height 170.2 cm (67\") Documented at 05/15/2023 0945   Admission Weight 73.9 kg (163 lb) Documented at 05/15/2023 0945          GEN: no distress, alert and oriented  HEENT: NACT, EOMI, moist mucous membranes  Lungs: CTAB, no wheezes, rales or rhonchi  CV: normal rate, regular rhythm, normal S1, S2, no murmurs, +2 radial pulses b/l, no carotid bruit  Abdomen: soft, nontender, nondistended, NABS  Extremities: no edema  Skin: no rash, warm, dry  Heme/Lymph: no bruising  Psych: organized thought, normal behavior and affect    Results from last 7 days   Lab Units 05/12/23  0446 05/11/23  1218   SODIUM mmol/L 142 139   POTASSIUM mmol/L 4.1 3.9   CHLORIDE mmol/L 108* 106   CO2 mmol/L 25.9 23.0   BUN mg/dL 18 24*   CREATININE mg/dL 0.84 1.18   CALCIUM mg/dL 8.8 9.0   BILIRUBIN mg/dL  --  0.4   ALK PHOS U/L  --  71   ALT (SGPT) U/L  --  17   AST (SGOT) U/L  --  12   GLUCOSE mg/dL 127* 214*     Results from last 7 days   Lab Units 05/12/23  0446 05/11/23  1440 05/11/23  1218   HSTROP T ng/L 127* 103* 102*       Results from last 7 days   Lab Units 05/12/23  0446 05/11/23  1218 "   WBC 10*3/mm3 7.67 5.93   HEMOGLOBIN g/dL 13.3 13.5   HEMATOCRIT % 39.5 41.1   PLATELETS 10*3/mm3 180 189                 I personally viewed and interpreted the patient's EKG/Telemetry data      Assessment:  Active Hospital Problems    Diagnosis  POA   • **CAD (coronary artery disease) [I25.10]  Unknown      Resolved Hospital Problems   No resolved problems to display.       Plan:  PCI to ramus with attempt of RCA         Petr Asher MD  05/15/23  12:05 EDT.

## 2023-05-15 NOTE — Clinical Note
First balloon inflation max pressure = 14 yasmani. First balloon inflation duration = 5 seconds. Second inflation of balloon - Max pressure = 14 yasmani. 2nd Inflation of balloon - Duration = 8 seconds. 2nd inflation was done at 13:19 EDT. Fluconazole Counseling:  Patient counseled regarding adverse effects of fluconazole including but not limited to headache, diarrhea, nausea, upset stomach, liver function test abnormalities, taste disturbance, and stomach pain.  There is a rare possibility of liver failure that can occur when taking fluconazole.  The patient understands that monitoring of LFTs and kidney function test may be required, especially at baseline. The patient verbalized understanding of the proper use and possible adverse effects of fluconazole.  All of the patient's questions and concerns were addressed.

## 2023-05-15 NOTE — Clinical Note
Second inflation of balloon - Max pressure = 16 yasmani. 2nd Inflation of balloon - Duration = 7 seconds.

## 2023-05-15 NOTE — Clinical Note
First balloon inflation max pressure = 18 yasmani. First balloon inflation duration = 5 seconds. Second inflation of balloon - Max pressure = 18 yasmani. 2nd Inflation of balloon - Duration = 5 seconds. 2nd inflation was done at 13:38 EDT. Third inflation of balloon - Max pressure = 18 yasmani. 3rd Inflation of balloon - Duration = 5 seconds. 3rd inflation was done at 13:38 EDT. Fourth inflation of balloon - Max pressure = 18 yasmani. 4th Inflation of  balloon - Duration = 5 seconds. 4th inflation was done at 13:39 EDT.

## 2023-05-15 NOTE — Clinical Note
First balloon inflation max pressure = 14 yasmani. First balloon inflation duration = 5 seconds. Second inflation of balloon - Max pressure = 16 yasmani. 2nd Inflation of balloon - Duration = 5 seconds. 2nd inflation was done at 13:56 EDT. Third inflation of balloon - Max pressure = 18 yasmani. 3rd Inflation of balloon - Duration = 5 seconds. 3rd inflation was done at 13:56 EDT. Fourth inflation of balloon - Max pressure = 18 yasmani. 4th Inflation of  balloon - Duration = 8 seconds. 4th inflation was done at 13:57 EDT.

## 2023-05-15 NOTE — Clinical Note
First balloon inflation max pressure = 12 yasmani. First balloon inflation duration = 8 seconds. Second inflation of balloon - Max pressure = 14 yasmani. 2nd Inflation of balloon - Duration = 6 seconds. 2nd inflation was done at 13:25 EDT.

## 2023-05-15 NOTE — DISCHARGE SUMMARY
[facility] Discharge Summary      Patient Name: Jesus Cuba  Age/Sex: 73 y.o. male  : 1950  MRN: [id number]    Encounter Provider: Petr Cao MD  Referring Provider: Juan Montes MD  Place of Service: [facility] CARDIOLOGY  Patient Care Team:  Rigoberto Lowe III, NP-C as PCP - General (Family Medicine)  Chineyre Sprague MD as Consulting Physician (Cardiology)  Shubham Escobedo MD as Surgeon (Vascular Surgery)  Marisela Sanchez RN as Ambulatory  ([facility])         Date of Discharge:  5/15/2023     Date of Admit: 2023      Discharge Diagnosis:   Exertional dyspnea        Hospital Course: 73-year-old man with CAD status post prior PCI with stenting to mid LAD, first diagonal, PDA, hypertension, hyperlipidemia, diabetes who presented with progressive dyspnea on exertion and elevated troponins on 23. He underwent LHC on 23 which revealed severe multivessel disease and after discussion, he decided against CABG so PCI to LAD and D1 was performed. He tolerated the procedure well and was stable for discharge home the next day with plans for staged PCI to ramus and possible attempt at RCA .      Vitals:    23 0823   BP: 148/77   Pulse:    Resp:    Temp:    SpO2:      Physical Exam:  GEN: no distress, alert and oriented  HEENT: NACT, EOMI, moist mucous membranes  Lungs: CTAB, no wheezes, rales or rhonchi  CV: normal rate, regular rhythm, normal S1, S2, no murmurs, +2 carotid and radial pulses b/l, no carotid bruit  Abdomen: soft, nontender, nondistended, NABS  Extremities: no edema  Skin: no rash, warm, dry  Heme/Lymph: no bruising  Psych: organized thought, normal behavior and affect     Procedures Performed:  Procedure(s):  Left Heart Cath  Coronary angiography  Percutaneous Coronary Intervention  Intravascular Ultrasound  Stent COREY coronary         Consults:  Consults     No orders found from  4/12/2023 to 5/12/2023.          Pertinent Test Results:    Results from last 7 days   Lab Units 05/12/23  0446 05/11/23  1218   SODIUM mmol/L 142 139   POTASSIUM mmol/L 4.1 3.9   CHLORIDE mmol/L 108* 106   CO2 mmol/L 25.9 23.0   BUN mg/dL 18 24*   CREATININE mg/dL 0.84 1.18   GLUCOSE mg/dL 127* 214*   CALCIUM mg/dL 8.8 9.0       Results from last 7 days   Lab Units 05/12/23  0446 05/11/23  1440 05/11/23  1218   HSTROP T ng/L 127* 103* 102*     Results from last 7 days   Lab Units 05/12/23  0446 05/11/23  1218   WBC 10*3/mm3 7.67 5.93   HEMOGLOBIN g/dL 13.3 13.5   HEMATOCRIT % 39.5 41.1   PLATELETS 10*3/mm3 180 189             Results from last 7 days   Lab Units 05/12/23  0446   CHOLESTEROL mg/dL 122   TRIGLYCERIDES mg/dL 216*   HDL CHOL mg/dL 36*                   Discharge Medications     Your medication list      START taking these medications      Instructions Last Dose Given Next Dose Due   aspirin 81 MG EC tablet  Start taking on: May 13, 2023      Take 1 tablet by mouth Daily.       atorvastatin 40 MG tablet  Commonly known as: LIPITOR      Take 1 tablet by mouth Every Night.          CHANGE how you take these medications      Instructions Last Dose Given Next Dose Due   gabapentin 300 MG capsule  Commonly known as: NEURONTIN  What changed: See the new instructions.      TAKE 1 CAPSULE THREE TIMES DAILY AS NEEDED FOR PAIN       levothyroxine 75 MCG tablet  Commonly known as: SYNTHROID, LEVOTHROID  What changed: when to take this      TAKE 1 TABLET EVERY DAY       Nitrostat 0.4 MG SL tablet  Generic drug: nitroglycerin  What changed: See the new instructions.      DISSOLVE ONE TABLET UNDER THE TONGUE AS NEEDED          CONTINUE taking these medications      Instructions Last Dose Given Next Dose Due   clopidogrel 75 MG tablet  Commonly known as: PLAVIX      Take 1 tablet by mouth daily.       Invokana 300 MG tablet tablet  Generic drug: Canagliflozin      Take 1 tablet by mouth Daily.       irbesartan 300 MG  tablet  Commonly known as: AVAPRO      TAKE 1 TABLET EVERY DAY       isosorbide mononitrate 30 MG 24 hr tablet  Commonly known as: IMDUR      Take 1 tablet by mouth Daily.       meloxicam 15 MG tablet  Commonly known as: MOBIC      Take 1 tablet by mouth Daily.       metFORMIN 1000 MG tablet  Commonly known as: GLUCOPHAGE      TAKE 1 TABLET TWICE DAILY WITH MEALS       metoprolol succinate XL 25 MG 24 hr tablet  Commonly known as: TOPROL-XL      TAKE 1/2 TABLET EVERY DAY       ranolazine 500 MG 12 hr tablet  Commonly known as: RANEXA      TAKE 1 TABLET TWICE DAILY       SITagliptin 100 MG tablet  Commonly known as: Januvia      Take 1 tablet by mouth Daily.          STOP taking these medications    pravastatin 80 MG tablet  Commonly known as: PRAVACHOL           ASK your doctor about these medications      Instructions Last Dose Given Next Dose Due   celecoxib 200 MG capsule  Commonly known as: CeleBREX      Take 1 capsule by mouth Daily.             Where to Get Your Medications      These medications were sent to [id number] - [place], [place] - [address] [phone] [facility] [phone]   [address] [id number]    Phone: [phone]   · aspirin 81 MG EC tablet  · atorvastatin 40 MG tablet           Discharge Diet:   Cardiac      Discharge disposition: Home    Discharge condition: Stable      Follow-up Appointments  Future Appointments   Date Time Provider Department Center   5/22/2023  9:15 AM [facility]   9/8/2023  9:30 AM Ale Espinoza APRN [facility]   12/11/2023 11:15 AM Rigoberto Lowe III, NP-C [facility]      Follow-up Information     [facility] .    Specialty: Cardiac Rehabilitation  Contact information:  [address] [facility] [address] [phone]           Rigoberto Lowe III, NP-C .    Specialty: Internal Medicine  Contact information:  [address] [facility] [address]  [place] [address]  [phone]                           Test Results Pending at Discharge  Additional Instructions for the Follow-ups that You Need to Schedule     Ambulatory Referral to Cardiac Rehab   As directed               Time:   I spent  < 30  minutes on this discharge activity which included: face-to-face encounter with the patient, reviewing the data in the system, coordination of the care with the nursing staff as well as consultants, documentation, and entering orders.        Petr Cao MD  [facility]  05/15/23  16:50 EDT

## 2023-05-15 NOTE — Clinical Note
First balloon inflation max pressure = 14 yasmani. First balloon inflation duration = 8 seconds. Second inflation of balloon - Max pressure = 18 yasmani. 2nd Inflation of balloon - Duration = 7 seconds. 2nd inflation was done at 13:46 EDT. Third inflation of balloon - Max pressure = 20 yasmani. 3rd Inflation of balloon - Duration = 7 seconds. 3rd inflation was done at 13:46 EDT. Fourth inflation of balloon - Max pressure = 20 yasmani. 4th Inflation of  balloon - Duration = 5 seconds. 4th inflation was done at 13:47 EDT.

## 2023-05-15 NOTE — Clinical Note
First balloon inflation max pressure = 12 yasmani. First balloon inflation duration = 10 seconds. Second inflation of balloon - Max pressure = 14 yasmani. 2nd Inflation of balloon - Duration = 10 seconds. 2nd inflation was done at 13:22 EDT. Third inflation of balloon - Max pressure = 14 yasmani. 3rd Inflation of balloon - Duration = 30 seconds. 3rd inflation was done at 13:23 EDT. Fourth inflation of balloon - Max pressure = 14 yasmani. 4th Inflation  of balloon - Duration = 6 seconds. 4th inflation was done at 13:24 EDT.

## 2023-05-15 NOTE — OUTREACH NOTE
Call Center TCM Note    Flowsheet Row Responses   [facility] patient discharged from? [place]   Does the patient have one of the following disease processes/diagnoses(primary or secondary)? Other   TCM attempt successful? No   Unsuccessful attempts Attempt 2          Annika Waddell MA    5/15/2023, 16:13 EDT

## 2023-05-15 NOTE — OUTREACH NOTE
Call Center TCM Note    Flowsheet Row Responses   [facility] patient discharged from? [place]   Does the patient have one of the following disease processes/diagnoses(primary or secondary)? Other   TCM attempt successful? No   Unsuccessful attempts Attempt 1          Annika Waddell MA    5/15/2023, 11:50 EDT

## 2023-05-15 NOTE — Clinical Note
First balloon inflation max pressure = 20 yasmani. First balloon inflation duration = 10 seconds. Second inflation of balloon - Max pressure = 20 yasmani. 2nd Inflation of balloon - Duration = 8 seconds. 2nd inflation was done at 13:53 EDT. Third inflation of balloon - Max pressure = 14 yasmani. 3rd Inflation of balloon - Duration = 5 seconds. 3rd inflation was done at 13:54 EDT.

## 2023-05-15 NOTE — Clinical Note
Hemostasis started on the right radial artery. R-Band was used in achieving hemostasis. Radial compression device applied to vessel.

## 2023-05-16 ENCOUNTER — TELEPHONE (OUTPATIENT)
Dept: INTERNAL MEDICINE | Facility: CLINIC | Age: 73
End: 2023-05-16
Payer: MEDICARE

## 2023-05-16 ENCOUNTER — TRANSITIONAL CARE MANAGEMENT TELEPHONE ENCOUNTER (OUTPATIENT)
Dept: CALL CENTER | Facility: HOSPITAL | Age: 73
End: 2023-05-16
Payer: MEDICARE

## 2023-05-16 NOTE — OUTREACH NOTE
Call Center TCM Note    Flowsheet Row Responses   [facility] patient discharged from? [place]   Does the patient have one of the following disease processes/diagnoses(primary or secondary)? Other   TCM attempt successful? Yes   Call start time 0054   Call end time 1258   Discharge diagnosis SOB,  chest pain   Meds reviewed with patient/caregiver? Yes   Is the patient having any side effects they believe may be caused by any medication additions or changes? No   Does the patient have all medications ordered at discharge? Yes   Is the patient taking all medications as directed (includes completed medication regime)? Yes   Does the patient have an appointment with their PCP within 7 days of discharge? No appointments available   Nursing Interventions Routed TCM call to PCP office, PCP office requested to make appointment - message sent   Has home health visited the patient within 72 hours of discharge? N/A   Psychosocial issues? No   Did the patient receive a copy of their discharge instructions? Yes   Nursing interventions Reviewed instructions with patient   What is the patient's perception of their health status since discharge? Improving   Is the patient/caregiver able to teach back signs and symptoms related to disease process for when to call PCP? Yes   Is the patient/caregiver able to teach back signs and symptoms related to disease process for when to call 911? Yes   Is the patient/caregiver able to teach back the hierarchy of who to call/visit for symptoms/problems? PCP, Specialist, Home health nurse, Urgent Care, ED, 911 Yes   If the patient is a current smoker, are they able to teach back resources for cessation? Not a smoker   TCM call completed? Yes   Wrap up additional comments D/C DX,  chest pain,  SOB - Pt feeling pretty well. d/c home from [facility] 05/12/2023, admitted for the day on 05/15/2023 for PCI. All medications in place. PCP Dr Lowe has no available times I could access for TCM APPT. OF  NOTE RE: APPT: Pt has fasting labs sched for 05/22/2023, but the sched ov on 06/12/2023 has been bumped and pt now has appt in 12/2023. Pt definitely wants sooner appt if office can review this and call pt please.   Call end time 5510          Annika Waddell MA    5/16/2023, 13:11 EDT

## 2023-05-17 LAB
ACT BLD: 275 SECONDS (ref 82–152)
ACT BLD: 299 SECONDS (ref 82–152)
ACT BLD: 299 SECONDS (ref 82–152)
ACT BLD: <50 SECONDS (ref 82–152)

## 2023-05-31 ENCOUNTER — OFFICE VISIT (OUTPATIENT)
Dept: CARDIOLOGY | Facility: CLINIC | Age: 73
End: 2023-05-31

## 2023-05-31 VITALS
HEIGHT: 66 IN | WEIGHT: 164.2 LBS | SYSTOLIC BLOOD PRESSURE: 132 MMHG | HEART RATE: 62 BPM | DIASTOLIC BLOOD PRESSURE: 80 MMHG | BODY MASS INDEX: 26.39 KG/M2 | OXYGEN SATURATION: 99 %

## 2023-05-31 DIAGNOSIS — Z95.5 S/P RIGHT CORONARY ARTERY (RCA) STENT PLACEMENT: ICD-10-CM

## 2023-05-31 DIAGNOSIS — I25.10 CORONARY ARTERY DISEASE INVOLVING NATIVE CORONARY ARTERY OF NATIVE HEART WITHOUT ANGINA PECTORIS: Chronic | ICD-10-CM

## 2023-05-31 DIAGNOSIS — Z95.5 STATUS POST INSERTION OF DRUG-ELUTING STENT INTO LEFT ANTERIOR DESCENDING (LAD) ARTERY: ICD-10-CM

## 2023-05-31 DIAGNOSIS — I10 ESSENTIAL HYPERTENSION: Primary | Chronic | ICD-10-CM

## 2023-05-31 PROCEDURE — 3079F DIAST BP 80-89 MM HG: CPT | Performed by: STUDENT IN AN ORGANIZED HEALTH CARE EDUCATION/TRAINING PROGRAM

## 2023-05-31 PROCEDURE — 99213 OFFICE O/P EST LOW 20 MIN: CPT | Performed by: STUDENT IN AN ORGANIZED HEALTH CARE EDUCATION/TRAINING PROGRAM

## 2023-05-31 PROCEDURE — 1159F MED LIST DOCD IN RCRD: CPT | Performed by: STUDENT IN AN ORGANIZED HEALTH CARE EDUCATION/TRAINING PROGRAM

## 2023-05-31 PROCEDURE — 1160F RVW MEDS BY RX/DR IN RCRD: CPT | Performed by: STUDENT IN AN ORGANIZED HEALTH CARE EDUCATION/TRAINING PROGRAM

## 2023-05-31 PROCEDURE — 3075F SYST BP GE 130 - 139MM HG: CPT | Performed by: STUDENT IN AN ORGANIZED HEALTH CARE EDUCATION/TRAINING PROGRAM

## 2023-05-31 NOTE — PROGRESS NOTES
Subjective:     Encounter Date:05/31/2023      Patient ID: Jesus Cuba is a 73 y.o. male.    Chief Complaint:  Follow up post cath    HPI:   73 y.o. male  with CAD status post prior PCI with stenting to mid LAD, first diagonal, PDA, hypertension, hyperlipidemia, diabetes who presented with progressive dyspnea on exertion and elevated troponins on 5/11/23. He underwent LHC on 5/11/23 which revealed severe multivessel disease and after discussion, he decided against CABG so PCI to LAD and D1 was performed.  He was also found to have severe ramus disease and a proximal RCA  so he underwent staged PCI on 5/15/2023 with 2.5 x 15mm Xience Skypoint drug-eluting stent (postdilated with 2.75 mm NC) to severe proximal ramus stenosis and intravascular ultrasound guided PCI with a 2.5 x 38 mm and 3.5 x 33 mm Xience Skypoint drug-eluting stents (postdilated proximally with a 4.5 mm NC and distally with a 3.5 mm NC) to proximal RCA .  Since his PCI, he has felt great, he is been walking frequently and has no symptoms.  With respect to his medication regimen, he is unsure if he is on Imdur as she does not remember that medication.      The following portions of the patient's history were reviewed and updated as appropriate: allergies, current medications, past family history, past medical history, past social history, past surgical history and problem list.     REVIEW OF SYSTEMS:   All systems reviewed.  Pertinent positives identified in HPI.  All other systems are negative.    Past Medical History:   Diagnosis Date   • CAD (coronary artery disease)    • Diabetes mellitus    • Dyslipidemia    • Hyperlipidemia    • Hypertension    • Hypothyroidism    • Myocardial infarction     NSTEMI       Family History   Problem Relation Age of Onset   • Cancer Mother         skin   • COPD Mother    • Hyperlipidemia Mother    • Hypertension Mother    • Cancer Father         lung   • Diabetes Father    • Heart disease Father    •  Hyperlipidemia Father    • Hypertension Father    • Early death Brother         50   • Alcohol abuse Brother    • Colon cancer Neg Hx    • Colon polyps Neg Hx        Social History     Socioeconomic History   • Marital status: Single   Tobacco Use   • Smoking status: Never   • Smokeless tobacco: Never   Vaping Use   • Vaping Use: Never used   Substance and Sexual Activity   • Alcohol use: Yes     Alcohol/week: 2.0 standard drinks     Types: 2 Shots of liquor per week     Comment: 2 drinks per week   • Drug use: Never   • Sexual activity: Yes     Partners: Female       No Known Allergies    Past Surgical History:   Procedure Laterality Date   • CARDIAC CATHETERIZATION  07/03/2018   • CARDIAC CATHETERIZATION N/A 05/11/2023    Procedure: Left Heart Cath;  Surgeon: Petr Asher MD;  Location:  [facility];  Service: Cardiovascular;  Laterality: N/A;   • CARDIAC CATHETERIZATION N/A 05/11/2023    Procedure: Coronary angiography;  Surgeon: Petr Asher MD;  Location:  [facility];  Service: Cardiovascular;  Laterality: N/A;   • CARDIAC CATHETERIZATION N/A 05/11/2023    Procedure: Percutaneous Coronary Intervention;  Surgeon: Petr Asher MD;  Location:  [facility];  Service: Cardiovascular;  Laterality: N/A;   • CARDIAC CATHETERIZATION N/A 05/11/2023    Procedure: Stent COREY coronary;  Surgeon: Petr Asher MD;  Location:  [facility];  Service: Cardiovascular;  Laterality: N/A;   • CARDIAC CATHETERIZATION N/A 5/15/2023    Procedure: Percutaneous Coronary Intervention;  Surgeon: Petr Asher MD;  [facility]:  [facility];  Service: Cardiology;  Laterality: N/A;   • CARDIAC CATHETERIZATION N/A 5/15/2023    Procedure: Stent COREY coronary;  Surgeon: Petr Asher MD;  [facility]:  [facility];  Service: Cardiology;  Laterality: N/A;   • CARDIAC CATHETERIZATION N/A 5/15/2023    Procedure: Chronic Total Occlussion;  Surgeon: Petr Asher  MD;  Location:  [facility];  Service: Cardiology;  Laterality: N/A;   • CORONARY ANGIOPLASTY WITH STENT PLACEMENT      4 stents  over several years - first in 2002   • INTERVENTIONAL RADIOLOGY PROCEDURE N/A 05/11/2023    Procedure: Intravascular Ultrasound;  Surgeon: Petr Asher MD;  [facility];  Service: Cardiovascular;  Laterality: N/A;   • INTERVENTIONAL RADIOLOGY PROCEDURE N/A 5/15/2023    Procedure: Intravascular Ultrasound;  Surgeon: Petr Asher MD;  [facility];  Service: Cardiology;  Laterality: N/A;   • SKIN BIOPSY      shoulder left       Procedures       Objective:         Vitals:    05/31/23 1226   BP: 132/80   Pulse: 62   SpO2: 99%       PHYSICAL EXAM:  GEN: well appearing, in NAD   HEENT: NCAT, EOMI, moist mucus membranes   Respiratory: CTAB, no wheezes, rales or rhonchi  CV: normal rate, regular rhythm, normal S1, S2, no murmurs, rubs, gallops, +2 radial pulses b/l  GI: soft, nontender, nondistended  MSK: no edema  Skin: no rash, warm, dry  Heme/Lymph: no bruising or bleeding  Neuro: Alert and Oriented x 3, grossly normal motor function        Assessment:         (I10) Essential hypertension    (I25.10) Coronary artery disease involving native coronary artery of native heart without angina pectoris    (Z95.5) S/P right coronary artery (RCA) stent placement    (Z95.5) Status post insertion of drug-eluting stent into left anterior descending (LAD) artery    73 y.o. male with CAD status post prior PCI with stenting to mid LAD, first diagonal, PDA, hypertension, hyperlipidemia, diabetes and recent multiple PCI's, see below, in the setting of NSTEMI who presents for follow-up.        Plan:       #CAD status post prior PCI  Patient presents with NSTEMI in early May and received PCI with drug-eluting stent to LAD and balloon angioplasty to D1. He was also found to have severe ramus disease and a proximal RCA  so he underwent staged  PCI on 5/15/2023 with 2.5 x 15mm Xience Skypoint drug-eluting stent (postdilated with 2.75 mm NC) to severe proximal ramus stenosis and intravascular ultrasound guided PCI with a 2.5 x 38 mm and 3.5 x 33 mm Xience Skypoint drug-eluting stents (postdilated proximally with a 4.5 mm NC and distally with a 3.5 mm NC) to proximal RCA .  - continue aspirin 81mg daily, plavix 75mg daily, will continue indefinitely given multiple layers of stents  - continue atorvastatin 40mg daily, tolerating well  - continue toprol 12.5mg daily  - d/c Ranexa for lower medication burden  - patient will check his medications when he is home and let me know if he is on Imdur    Dr Lowe,  thank you very much for referring this kind patient to me. Please call me with any questions or concerns. I will see the patient again in the office in 6 months or earlier as needed.         Petr Asher MD  05/31/23  [facility]    Outpatient Encounter Medications as of 5/31/2023   Medication Sig Dispense Refill   • aspirin 81 MG EC tablet Take 1 tablet by mouth Daily. 90 tablet 3   • atorvastatin (LIPITOR) 40 MG tablet Take 1 tablet by mouth Every Night. 90 tablet 3   • Canagliflozin (Invokana) 300 MG tablet tablet Take 1 tablet by mouth Daily. 90 tablet 1   • celecoxib (CeleBREX) 200 MG capsule Take 1 capsule by mouth Daily. (Patient taking differently: Take 1 capsule by mouth Daily As Needed.) 30 capsule 5   • clopidogrel (PLAVIX) 75 MG tablet Take 1 tablet by mouth daily. 90 tablet 1   • gabapentin (NEURONTIN) 300 MG capsule TAKE 1 CAPSULE THREE TIMES DAILY AS NEEDED FOR PAIN (Patient taking differently: Take 2 capsules by mouth Daily With Dinner.) 270 capsule 1   • irbesartan (AVAPRO) 300 MG tablet TAKE 1 TABLET EVERY DAY (Patient taking differently: Take 1 tablet by mouth Daily.) 90 tablet 3   • isosorbide mononitrate (IMDUR) 30 MG 24 hr tablet Take 1 tablet by mouth Daily.  1   • levothyroxine (SYNTHROID, LEVOTHROID) 75 MCG tablet  TAKE 1 TABLET EVERY DAY (Patient taking differently: Take 1 tablet by mouth Every Morning.) 90 tablet 3   • metFORMIN (GLUCOPHAGE) 1000 MG tablet TAKE 1 TABLET TWICE DAILY WITH MEALS (Patient taking differently: Take 1 tablet by mouth 2 (Two) Times a Day With Meals.) 180 tablet 1   • metoprolol succinate XL (TOPROL-XL) 25 MG 24 hr tablet TAKE 1/2 TABLET EVERY DAY (Patient taking differently: Take 12.5 mg by mouth Daily.) 45 tablet 2   • NITROSTAT 0.4 MG SL tablet DISSOLVE ONE TABLET UNDER THE TONGUE AS NEEDED (Patient taking differently: Place 1 tablet under the tongue Every 5 (Five) Minutes As Needed.) 25 tablet 0   • SITagliptin (Januvia) 100 MG tablet Take 1 tablet by mouth Daily. 90 tablet 2   • [DISCONTINUED] ranolazine (RANEXA) 500 MG 12 hr tablet TAKE 1 TABLET TWICE DAILY (Patient taking differently: Take 1 tablet by mouth 2 (Two) Times a Day.) 180 tablet 1     No facility-administered encounter medications on file as of 5/31/2023.

## 2023-07-28 DIAGNOSIS — M15.9 PRIMARY OSTEOARTHRITIS INVOLVING MULTIPLE JOINTS: Chronic | ICD-10-CM

## 2023-07-28 RX ORDER — IRBESARTAN 300 MG/1
TABLET ORAL
Qty: 90 TABLET | Refills: 3 | Status: SHIPPED | OUTPATIENT
Start: 2023-07-28

## 2023-07-31 RX ORDER — CELECOXIB 200 MG/1
CAPSULE ORAL
Qty: 90 CAPSULE | Refills: 3 | Status: SHIPPED | OUTPATIENT
Start: 2023-07-31

## 2023-08-30 ENCOUNTER — OFFICE VISIT (OUTPATIENT)
Dept: INTERNAL MEDICINE | Facility: CLINIC | Age: 73
End: 2023-08-30
Payer: MEDICARE

## 2023-08-30 VITALS
SYSTOLIC BLOOD PRESSURE: 119 MMHG | HEIGHT: 66 IN | DIASTOLIC BLOOD PRESSURE: 78 MMHG | BODY MASS INDEX: 26.71 KG/M2 | HEART RATE: 58 BPM | OXYGEN SATURATION: 98 % | WEIGHT: 166.2 LBS

## 2023-08-30 DIAGNOSIS — E78.5 HYPERLIPIDEMIA LDL GOAL <70: Primary | Chronic | ICD-10-CM

## 2023-08-30 DIAGNOSIS — I25.10 CORONARY ARTERY DISEASE INVOLVING NATIVE CORONARY ARTERY OF NATIVE HEART WITHOUT ANGINA PECTORIS: Chronic | ICD-10-CM

## 2023-08-30 DIAGNOSIS — E11.65 TYPE 2 DIABETES MELLITUS WITH HYPERGLYCEMIA, WITHOUT LONG-TERM CURRENT USE OF INSULIN: Chronic | ICD-10-CM

## 2023-08-30 DIAGNOSIS — L57.0 AK (ACTINIC KERATOSIS): ICD-10-CM

## 2023-08-30 DIAGNOSIS — I10 ESSENTIAL HYPERTENSION: Chronic | ICD-10-CM

## 2023-08-30 DIAGNOSIS — E03.4 HYPOTHYROIDISM DUE TO ACQUIRED ATROPHY OF THYROID: Chronic | ICD-10-CM

## 2023-08-30 PROCEDURE — 3078F DIAST BP <80 MM HG: CPT | Performed by: NURSE PRACTITIONER

## 2023-08-30 PROCEDURE — 3051F HG A1C>EQUAL 7.0%<8.0%: CPT | Performed by: NURSE PRACTITIONER

## 2023-08-30 PROCEDURE — 1160F RVW MEDS BY RX/DR IN RCRD: CPT | Performed by: NURSE PRACTITIONER

## 2023-08-30 PROCEDURE — 3074F SYST BP LT 130 MM HG: CPT | Performed by: NURSE PRACTITIONER

## 2023-08-30 PROCEDURE — 1159F MED LIST DOCD IN RCRD: CPT | Performed by: NURSE PRACTITIONER

## 2023-08-30 PROCEDURE — 99214 OFFICE O/P EST MOD 30 MIN: CPT | Performed by: NURSE PRACTITIONER

## 2023-08-30 NOTE — ASSESSMENT & PLAN NOTE
Your last A1C (3 month average) =   Lab Results   Component Value Date    HGBA1C 7.50 (H) 05/12/2023      Your diabetes is Uncontrolled.    The American Diabetes Association recommends an A1C of less than 7%.  A1C Average Levels Blood Sugar:   6%  126 mg/dL  7%  154 mg/dL  8%  183 mg/dL  9%  212 mg/dL  10%  240 mg/dL  11%  269 mg/dL  12%  298 mg/dL    Glucose goals for many adults with diabetes  Blood sugar before meals  mg/dL  Blood sugar 1-2 hours after the start of a meal Less than 180 mg/dL A1C Less than 7%    Eye Health:   You need a diabetic eye exam yearly.   Please have a copy of the note faxed to my office.   Fax: [phone]    Foot Health:   You need a diabetic foot exam yearly.   Check your feet routinely for any wounds.   You should always check your shoes for any debris that could cause a wound.

## 2023-08-30 NOTE — PROGRESS NOTES
Chief Complaint  Cough (Dry cough ) and throat problem (Scratchy throat )     Subjective:      History of Present Illness {CC  Problem List  Visit  Diagnosis   Encounters  Notes  Medications  Labs  Result Review Imaging  Media :23}     Jesus Cuba presents to [facility] for:   hypertension, HLD, DMII w PN, CAD (stent to LAD 7/2018), GERD, Anxiety, hypothyroid, PVD (left lower: Dr Escobedo)       Over the summer he states he was having to sit down twice when he was mowing the grass.  He was admitted and discharged in May for exertional dyspnea: He underwent LHC on 5/11/23 which revealed severe multivessel disease and after discussion, he decided against CABG so PCI to LAD and D1 was performed.  Dr Asher.   States he is doing much better and with no shortness of breath.    Hypertension: He continues Avapro 300 mg, Toprol 12.5 mg daily  No shortness of breath or chest pain.    CAD: Continues ASA, Plavix, statin (he was changed from pravachol to lipitor and states is tolerating this time: no myalgia)     DM: Not controlled.  Continues metformin, Januvia 100 mg, Invokana 300 mg daily    Hypothyroid continue Synthroid 75 mcg daily    He had a cough: States is resolved.  Feels like it was related to the weather change.  No fever or chills        I have reviewed patient's medical history, any new submitted information provided by patient or medical assistant and updated medical record.      Objective:      Physical Exam  Vitals reviewed.   Constitutional:       Appearance: Normal appearance. He is well-developed.   HENT:      Head:     Neck:      Thyroid: No thyromegaly.   Cardiovascular:      Rate and Rhythm: Normal rate and regular rhythm.      Pulses: Normal pulses.      Heart sounds: Normal heart sounds.   Pulmonary:      Effort: Pulmonary effort is normal.      Breath sounds: Normal breath sounds.      Comments: E/U   Musculoskeletal:         General: Normal range of  "motion.      Cervical back: Normal range of motion and neck supple.      Right lower leg: No edema.      Left lower leg: No edema.   Lymphadenopathy:      Cervical: No cervical adenopathy.   Skin:     General: Skin is warm and dry.      Capillary Refill: Capillary refill takes 2 to 3 seconds.   Neurological:      Mental Status: He is alert and oriented to person, place, and time.   Psychiatric:         Mood and Affect: Mood normal.         Behavior: Behavior normal. Behavior is cooperative.         Thought Content: Thought content normal.         Judgment: Judgment normal.      Result Review  Data Reviewed:{ Labs  Result Review  Imaging  Med Tab  Media :23}     The following data was reviewed by: Rigoberto Lowe III, NP-C on 08/30/2023  Common labs          2/20/2023    10:09 5/11/2023    12:18 5/12/2023    04:46   Common Labs   Glucose 129  214  127    BUN 28  24  18    Creatinine 1.00  1.18  0.84    Sodium 141  139  142    Potassium 4.5  3.9  4.1    Chloride 103  106  108    Calcium 9.3  9.0  8.8    Total Protein 6.7      Albumin 4.3  4.2     Total Bilirubin 0.6  0.4     Alkaline Phosphatase 64  71     AST (SGOT) 14  12     ALT (SGPT) 22  17     WBC 6.6  5.93  7.67    Hemoglobin 15.3  13.5  13.3    Hematocrit 45.0  41.1  39.5    Platelets 194  189  180    Total Cholesterol   122    Total Cholesterol 170      Triglycerides 133   216    HDL Cholesterol 42   36    LDL Cholesterol  104   51    Hemoglobin A1C 7.8   7.50    Microalbumin, Urine 13.7               Vital Signs:   /78 (BP Location: Left arm, Patient Position: Sitting, Cuff Size: Adult)   Pulse 58   Ht 167.6 cm (66\")   Wt 75.4 kg (166 lb 3.2 oz)   SpO2 98%   BMI 26.83 kg/mý         Requested Prescriptions      No prescriptions requested or ordered in this encounter       Routine medications provided by this office will also be refilled via pharmacy request.       Current Outpatient Medications:     aspirin 81 MG EC tablet, Take 1 " tablet by mouth Daily., Disp: 90 tablet, Rfl: 3    atorvastatin (LIPITOR) 40 MG tablet, Take 1 tablet by mouth Every Night., Disp: 90 tablet, Rfl: 3    Canagliflozin (Invokana) 300 MG tablet tablet, TAKE 1 TABLET EVERY DAY, Disp: 30 tablet, Rfl: 1    celecoxib (CeleBREX) 200 MG capsule, TAKE 1 CAPSULE EVERY DAY, Disp: 90 capsule, Rfl: 3    clopidogrel (PLAVIX) 75 MG tablet, Take 1 tablet by mouth daily., Disp: 90 tablet, Rfl: 1    gabapentin (NEURONTIN) 300 MG capsule, TAKE 1 CAPSULE THREE TIMES DAILY AS NEEDED FOR PAIN (Patient taking differently: Take 2 capsules by mouth Daily With Dinner.), Disp: 270 capsule, Rfl: 1    irbesartan (AVAPRO) 300 MG tablet, TAKE 1 TABLET EVERY DAY, Disp: 90 tablet, Rfl: 3    levothyroxine (SYNTHROID, LEVOTHROID) 75 MCG tablet, TAKE 1 TABLET EVERY DAY (Patient taking differently: Take 1 tablet by mouth Every Morning.), Disp: 90 tablet, Rfl: 3    metFORMIN (GLUCOPHAGE) 1000 MG tablet, TAKE 1 TABLET TWICE DAILY WITH MEALS (Patient taking differently: Take 1 tablet by mouth 2 (Two) Times a Day With Meals.), Disp: 180 tablet, Rfl: 1    metoprolol succinate XL (TOPROL-XL) 25 MG 24 hr tablet, TAKE 1/2 TABLET EVERY DAY (Patient taking differently: Take 0.5 tablets by mouth Daily.), Disp: 45 tablet, Rfl: 2    NITROSTAT 0.4 MG SL tablet, DISSOLVE ONE TABLET UNDER THE TONGUE AS NEEDED (Patient taking differently: Place 1 tablet under the tongue Every 5 (Five) Minutes As Needed.), Disp: 25 tablet, Rfl: 0    SITagliptin (Januvia) 100 MG tablet, Take 1 tablet by mouth Daily., Disp: 90 tablet, Rfl: 2    isosorbide mononitrate (IMDUR) 30 MG 24 hr tablet, Take 1 tablet by mouth Daily. (Patient not taking: Reported on 8/30/2023), Disp: , Rfl: 1     Assessment and Plan:      Assessment and Plan {CC Problem List  Visit Diagnosis  ROS  Review (Popup)  Health Maintenance  Quality  BestPractice  Medications  SmartSets  SnapShot Encounters  Media :23}     Problem List Items Addressed This Visit           Cardiac and Vasculature    CAD (coronary artery disease) (Chronic)    Overview     Due to the presence of severe small vessel disease and multiple stents: Cardiology recommended stay on DAPT as long as he tolerates.    5/11/23 which revealed severe multivessel disease and after discussion, he decided against CABG so PCI to LAD and D1 was performed.            Essential hypertension (Chronic)    Current Assessment & Plan     Hypertension is improving with treatment.  Continue current treatment regimen.  Dietary sodium restriction.  Regular aerobic exercise.  Continue current medications.  Blood pressure will be reassessed at the next regular appointment.         Relevant Orders    Comprehensive Metabolic Panel    Hyperlipidemia LDL goal <70 - Primary (Chronic)    Current Assessment & Plan     Lipid abnormalities are improving with treatment.  Pharmacotherapy as ordered.  Lipids will be reassessed in 6 months.         Relevant Orders    Lipid Panel With LDL / HDL Ratio    Comprehensive Metabolic Panel       Endocrine and Metabolic    Hypothyroidism (Chronic)    Current Assessment & Plan     Stable   Check lab for ongoing therapeutic drug monitoring           Relevant Orders    TSH Rfx On Abnormal To Free T4    Type 2 diabetes mellitus with hyperglycemia, without long-term current use of insulin (Chronic)    Current Assessment & Plan     Your last A1C (3 month average) =   Lab Results   Component Value Date    HGBA1C 7.50 (H) 05/12/2023      Your diabetes is Uncontrolled.    The American Diabetes Association recommends an A1C of less than 7%.  A1C Average Levels Blood Sugar:   6%  126 mg/dL  7%  154 mg/dL  8%  183 mg/dL  9%  212 mg/dL  10%  240 mg/dL  11%  269 mg/dL  12%  298 mg/dL    Glucose goals for many adults with diabetes  Blood sugar before meals  mg/dL  Blood sugar 1-2 hours after the start of a meal Less than 180 mg/dL A1C Less than 7%    Eye Health:   You need a diabetic eye exam yearly.    Please have a copy of the note faxed to my office.   Fax: [phone]    Foot Health:   You need a diabetic foot exam yearly.   Check your feet routinely for any wounds.   You should always check your shoes for any debris that could cause a wound.             Relevant Orders    Hemoglobin A1c    Comprehensive Metabolic Panel     Other Visit Diagnoses       AK (actinic keratosis)        forehead: fu with Derm: Asim            States does not drink a lot of water; more coke zero.   Advised to limit sodium.     Follow Up {Instructions Charge Capture  Follow-up Communications :23}     Return for Next scheduled follow up.      Patient was given instructions and counseling regarding his condition or for health maintenance advice. Please see specific information pulled into the AVS if appropriate.    Dragon disclaimer:   Much of this encounter note is an electronic transcription/translation of spoken language to printed text. The electronic translation of spoken language may permit erroneous, or at times, nonsensical words or phrases to be inadvertently transcribed; Although I have reviewed the note for such errors, some may still exist.     Additional Patient Counseling:       There are no Patient Instructions on file for this visit.

## 2023-09-01 LAB
ALBUMIN SERPL-MCNC: 4.2 G/DL (ref 3.5–5.2)
ALBUMIN/GLOB SERPL: 1.8 G/DL
ALP SERPL-CCNC: 95 U/L (ref 39–117)
ALT SERPL-CCNC: 15 U/L (ref 1–41)
AST SERPL-CCNC: 12 U/L (ref 1–40)
BILIRUB SERPL-MCNC: 0.5 MG/DL (ref 0–1.2)
BUN SERPL-MCNC: 18 MG/DL (ref 8–23)
BUN/CREAT SERPL: 20.9 (ref 7–25)
CALCIUM SERPL-MCNC: 9 MG/DL (ref 8.6–10.5)
CHLORIDE SERPL-SCNC: 105 MMOL/L (ref 98–107)
CHOLEST SERPL-MCNC: 138 MG/DL (ref 0–200)
CO2 SERPL-SCNC: 24.8 MMOL/L (ref 22–29)
CREAT SERPL-MCNC: 0.86 MG/DL (ref 0.76–1.27)
EGFRCR SERPLBLD CKD-EPI 2021: 92 ML/MIN/1.73
GLOBULIN SER CALC-MCNC: 2.3 GM/DL
GLUCOSE SERPL-MCNC: 158 MG/DL (ref 65–99)
HBA1C MFR BLD: 7.4 % (ref 4.8–5.6)
HDLC SERPL-MCNC: 37 MG/DL (ref 40–60)
LDLC SERPL CALC-MCNC: 64 MG/DL (ref 0–100)
LDLC/HDLC SERPL: 1.5 {RATIO}
POTASSIUM SERPL-SCNC: 4.2 MMOL/L (ref 3.5–5.2)
PROT SERPL-MCNC: 6.5 G/DL (ref 6–8.5)
SODIUM SERPL-SCNC: 142 MMOL/L (ref 136–145)
TRIGL SERPL-MCNC: 228 MG/DL (ref 0–150)
TSH SERPL DL<=0.005 MIU/L-ACNC: 2.4 UIU/ML (ref 0.27–4.2)
VLDLC SERPL CALC-MCNC: 37 MG/DL (ref 5–40)

## 2023-09-08 DIAGNOSIS — E11.9 DIABETES MELLITUS WITHOUT COMPLICATION: ICD-10-CM

## 2023-09-13 ENCOUNTER — OFFICE VISIT (OUTPATIENT)
Dept: CARDIOLOGY | Facility: CLINIC | Age: 73
End: 2023-09-13
Payer: MEDICARE

## 2023-09-13 VITALS
SYSTOLIC BLOOD PRESSURE: 122 MMHG | BODY MASS INDEX: 25.88 KG/M2 | DIASTOLIC BLOOD PRESSURE: 66 MMHG | HEART RATE: 77 BPM | HEIGHT: 66 IN | WEIGHT: 161 LBS

## 2023-09-13 DIAGNOSIS — I25.10 CORONARY ARTERY DISEASE INVOLVING NATIVE CORONARY ARTERY OF NATIVE HEART WITHOUT ANGINA PECTORIS: Primary | Chronic | ICD-10-CM

## 2023-09-13 DIAGNOSIS — I10 ESSENTIAL HYPERTENSION: Chronic | ICD-10-CM

## 2023-09-13 DIAGNOSIS — E78.5 HYPERLIPIDEMIA LDL GOAL <70: Chronic | ICD-10-CM

## 2023-09-13 PROCEDURE — 3078F DIAST BP <80 MM HG: CPT | Performed by: NURSE PRACTITIONER

## 2023-09-13 PROCEDURE — 1160F RVW MEDS BY RX/DR IN RCRD: CPT | Performed by: NURSE PRACTITIONER

## 2023-09-13 PROCEDURE — 93000 ELECTROCARDIOGRAM COMPLETE: CPT | Performed by: NURSE PRACTITIONER

## 2023-09-13 PROCEDURE — 99214 OFFICE O/P EST MOD 30 MIN: CPT | Performed by: NURSE PRACTITIONER

## 2023-09-13 PROCEDURE — 3074F SYST BP LT 130 MM HG: CPT | Performed by: NURSE PRACTITIONER

## 2023-09-13 PROCEDURE — 1159F MED LIST DOCD IN RCRD: CPT | Performed by: NURSE PRACTITIONER

## 2023-09-13 NOTE — PROGRESS NOTES
Date of Office Visit: 2023  Encounter Provider: CHANDU Torres  Place of Service: [facility] CARDIOLOGY  Patient Name: Jesus Cuba  :1950    Chief Complaint   Patient presents with    Hypertension   :     HPI: Jesus Cuba is a 73 y.o. male patient with hypertension, hyperlipidemia, coronary artery disease.  He has a history of multiple PCI's.  He has previously followed with Dr. Dey.    In May of this year, he presented with exertional dyspnea and ruled in for a non-STEMI.  Cardiac catheterization demonstrated multivessel disease.  He underwent angioplasty to the first diagonal and drug-eluting stenting of the proximal LAD.  Four days later he underwent staged intervention of the proximal ramus.    He was last seen in the office by Dr. Cao on  at which time he felt great.  Dr. Cao recommended DAPT lifelong.  He was advised to follow-up in 6 months.    He has been feeling great.  He denies any chest pain, shortness of breath, palpitations, edema, dizziness, syncope, bleeding difficulties or melena.  He is walking every day.    Past Medical History:   Diagnosis Date    CAD (coronary artery disease)     Diabetes mellitus     Dyslipidemia     Hyperlipidemia     Hypertension     Hypothyroidism     Myocardial infarction     NSTEMI       Past Surgical History:   Procedure Laterality Date    CARDIAC CATHETERIZATION  2018    CARDIAC CATHETERIZATION N/A 2023    Procedure: Left Heart Cath;  Surgeon: Petr Asher MD;  [facility];  Service: Cardiovascular;  Laterality: N/A;    CARDIAC CATHETERIZATION N/A 2023    Procedure: Coronary angiography;  Surgeon: Petr Asher MD;  Location: [facility] CATH INVASIVE LOCATION;  Service: Cardiovascular;  Laterality: N/A;    CARDIAC CATHETERIZATION N/A 2023    Procedure: Percutaneous Coronary Intervention;  Surgeon: Petr Asher MD;  Location: [facility] CATH INVASIVE LOCATION;   Service: Cardiovascular;  Laterality: N/A;    CARDIAC CATHETERIZATION N/A 05/11/2023    Procedure: Stent COREY coronary;  Surgeon: Petr Asher MD;  Location:  [facility];  Service: Cardiovascular;  Laterality: N/A;    CARDIAC CATHETERIZATION N/A 5/15/2023    Procedure: Percutaneous Coronary Intervention;  Surgeon: Petr Asher MD;  Location:  [facility] C[facility] INVASIVE [facility];  Service: Cardiology;  Laterality: N/A;    CARDIAC CATHETERIZATION N/A 5/15/2023    Procedure: Stent COREY coronary;  Surgeon: Petr Asher MD;  Location:  [facility] C[facility] INVASIVE [facility];  Service: Cardiology;  Laterality: N/A;    CARDIAC CATHETERIZATION N/A 5/15/2023    Procedure: Chronic Total Occlussion;  Surgeon: Petr Asher MD;  [facility];  Service: Cardiology;  Laterality: N/A;    CORONARY ANGIOPLASTY WITH STENT PLACEMENT      4 stents  over several years - first in 2002    INTERVENTIONAL RADIOLOGY PROCEDURE N/A 05/11/2023    Procedure: Intravascular Ultrasound;  Surgeon: Petr Asher MD;  [facility];  Service: Cardiovascular;  Laterality: N/A;    INTERVENTIONAL RADIOLOGY PROCEDURE N/A 5/15/2023    Procedure: Intravascular Ultrasound;  Surgeon: Petr Asher MD;  Location:  [facility] CATH INVASIVE [facility];  Service: Cardiology;  Laterality: N/A;    SKIN BIOPSY      shoulder left       Social History     Socioeconomic History    Marital status: Single   Tobacco Use    Smoking status: Never    Smokeless tobacco: Never   Vaping Use    Vaping Use: Never used   Substance and Sexual Activity    Alcohol use: Yes     Alcohol/week: 2.0 standard drinks     Types: 2 Shots of liquor per week     Comment: 2 drinks per week    Drug use: Never    Sexual activity: Yes     Partners: Female       Family History   Problem Relation Age of Onset    Cancer Mother         skin    COPD Mother     Hyperlipidemia Mother     Hypertension Mother     Cancer Father         lung    Diabetes Father     Heart  disease Father     Hyperlipidemia Father     Hypertension Father     Early death Brother         50    Alcohol abuse Brother     Colon cancer Neg Hx     Colon polyps Neg Hx        Review of Systems   Constitutional: Negative.   Cardiovascular: Negative.  Negative for chest pain, dyspnea on exertion, leg swelling, orthopnea, paroxysmal nocturnal dyspnea and syncope.   Respiratory: Negative.     Hematologic/Lymphatic: Negative for bleeding problem.   Musculoskeletal:  Negative for falls.   Gastrointestinal:  Negative for melena.   Neurological:  Negative for dizziness and light-headedness.     No Known Allergies      Current Outpatient Medications:     aspirin 81 MG EC tablet, Take 1 tablet by mouth Daily., Disp: 90 tablet, Rfl: 3    atorvastatin (LIPITOR) 40 MG tablet, Take 1 tablet by mouth Every Night., Disp: 90 tablet, Rfl: 3    Canagliflozin (Invokana) 300 MG tablet tablet, TAKE 1 TABLET EVERY DAY, Disp: 30 tablet, Rfl: 1    celecoxib (CeleBREX) 200 MG capsule, TAKE 1 CAPSULE EVERY DAY, Disp: 90 capsule, Rfl: 3    clopidogrel (PLAVIX) 75 MG tablet, Take 1 tablet by mouth daily., Disp: 90 tablet, Rfl: 1    gabapentin (NEURONTIN) 300 MG capsule, TAKE 1 CAPSULE THREE TIMES DAILY AS NEEDED FOR PAIN (Patient taking differently: Take 2 capsules by mouth Daily With Dinner.), Disp: 270 capsule, Rfl: 1    irbesartan (AVAPRO) 300 MG tablet, TAKE 1 TABLET EVERY DAY, Disp: 90 tablet, Rfl: 3    isosorbide mononitrate (IMDUR) 30 MG 24 hr tablet, Take 1 tablet by mouth Daily., Disp: , Rfl: 1    levothyroxine (SYNTHROID, LEVOTHROID) 75 MCG tablet, TAKE 1 TABLET EVERY DAY (Patient taking differently: Take 1 tablet by mouth Every Morning.), Disp: 90 tablet, Rfl: 3    metFORMIN (GLUCOPHAGE) 1000 MG tablet, TAKE 1 TABLET TWICE DAILY WITH MEALS, Disp: 180 tablet, Rfl: 1    metoprolol succinate XL (TOPROL-XL) 25 MG 24 hr tablet, TAKE 1/2 TABLET EVERY DAY (Patient taking differently: Take 0.5 tablets by mouth Daily.), Disp: 45 tablet,  "Rfl: 2    NITROSTAT 0.4 MG SL tablet, DISSOLVE ONE TABLET UNDER THE TONGUE AS NEEDED (Patient taking differently: Place 1 tablet under the tongue Every 5 (Five) Minutes As Needed.), Disp: 25 tablet, Rfl: 0    SITagliptin (Januvia) 100 MG tablet, Take 1 tablet by mouth Daily., Disp: 90 tablet, Rfl: 2      Objective:     Vitals:    09/13/23 1453   BP: 122/66   Pulse: 77   Weight: 73 kg (161 lb)   Height: 167.6 cm (66\")     Body mass index is 25.99 kg/m².    PHYSICAL EXAM:    Neck:      Vascular: No JVD.   Pulmonary:      Effort: Pulmonary effort is normal.      Breath sounds: Normal breath sounds.   Cardiovascular:      Normal rate. Regular rhythm.      Murmurs: There is no murmur.      No gallop.  No click. No rub.   Pulses:     Intact distal pulses.         ECG 12 Lead    Date/Time: 9/13/2023 3:07 PM  Performed by: Ale Espinoza APRN  Authorized by: Ale Espinoza APRN   Comparison: compared with previous ECG from 5/12/2023  Similar to previous ECG  Rhythm: sinus rhythm  Rate: normal  BPM: 77  Q waves: II    T inversion: V5 and V6          Assessment:       Diagnosis Plan   1. Coronary artery disease involving native coronary artery of native heart without angina pectoris  ECG 12 Lead      2. Essential hypertension        3. Hyperlipidemia LDL goal <70          Orders Placed This Encounter   Procedures    ECG 12 Lead     This order was created via procedure documentation     Order Specific Question:   Release to patient     Answer:   Routine Release [[id number]]          Plan:       1.  Coronary artery disease.  He denies any symptoms of angina.  Continue DAPT and high intensity statin lifelong.      2.  Hypertension.  His blood pressure looks great.  Continue irbesartan and Toprol.      3.  Hyperlipidemia.  Recent lipid panel demonstrated an LDL of 64 and an HDL of 37.  Continue atorvastatin.      I think he is doing well.  I am not recommending any changes, and he will follow-up with Dr. Cao in 6 " months.      As always, it has been a pleasure to participate in your patient's care.      Sincerely,         CHANDU aBrajas

## 2023-09-18 ENCOUNTER — TELEPHONE (OUTPATIENT)
Dept: CARDIOLOGY | Facility: CLINIC | Age: 73
End: 2023-09-18

## 2023-09-18 NOTE — TELEPHONE ENCOUNTER
Caller: Jesus Cuba    Relationship: Self    Best call back number: [phone]    What is the best time to reach you: ANY    Who are you requesting to speak with (clinical staff, provider,  specific staff member): CLINICAL        What was the call regarding: PATIENT STATED THAT HE WOULD LIKE FOR SOMEONE TO CALL AND GO OVER HIS EKG RESULTS WITH HIM BECAUSE HE WAS TOLD THEY WERE NOT NORMAL.     Is it okay if the provider responds through [facility]: PLEASE CALL.

## 2023-09-25 NOTE — TELEPHONE ENCOUNTER
Jesus Cuba returned call as he has not received a response from a call he placed on 9/18.    Patient recently saw Ale (9/13/23) and stated he was told everything was good.  Patient stated his EKG was listed as abnormal and he is wanting to know why/what is abnormal about his EKG?  Is there anything he needs to be doing at home as result of this abnormal EKG?    Please let me know how you would like to proceed.    Thank you,  Radha PATRICK RN  Triage Nurse [facility]   09:36 EDT

## 2023-10-06 DIAGNOSIS — I10 ESSENTIAL HYPERTENSION: Chronic | ICD-10-CM

## 2023-10-06 RX ORDER — METOPROLOL SUCCINATE 25 MG/1
TABLET, EXTENDED RELEASE ORAL
Qty: 45 TABLET | Refills: 3 | Status: SHIPPED | OUTPATIENT
Start: 2023-10-06

## 2023-10-16 ENCOUNTER — OFFICE VISIT (OUTPATIENT)
Dept: INTERNAL MEDICINE | Facility: CLINIC | Age: 73
End: 2023-10-16
Payer: MEDICARE

## 2023-10-16 VITALS
SYSTOLIC BLOOD PRESSURE: 124 MMHG | WEIGHT: 162.2 LBS | HEIGHT: 66 IN | OXYGEN SATURATION: 98 % | HEART RATE: 70 BPM | BODY MASS INDEX: 26.07 KG/M2 | DIASTOLIC BLOOD PRESSURE: 80 MMHG

## 2023-10-16 DIAGNOSIS — E11.65 TYPE 2 DIABETES MELLITUS WITH HYPERGLYCEMIA, WITHOUT LONG-TERM CURRENT USE OF INSULIN: Chronic | ICD-10-CM

## 2023-10-16 DIAGNOSIS — R05.8 DRY COUGH: Primary | ICD-10-CM

## 2023-10-16 DIAGNOSIS — R49.0 HOARSENESS: ICD-10-CM

## 2023-10-16 DIAGNOSIS — J02.9 SORE THROAT: ICD-10-CM

## 2023-10-16 LAB
EXPIRATION DATE: ABNORMAL
FLUAV AG UPPER RESP QL IA.RAPID: NOT DETECTED
FLUBV AG UPPER RESP QL IA.RAPID: NOT DETECTED
INTERNAL CONTROL: ABNORMAL
Lab: ABNORMAL
SARS-COV-2 AG UPPER RESP QL IA.RAPID: DETECTED

## 2023-10-16 PROCEDURE — 87428 SARSCOV & INF VIR A&B AG IA: CPT

## 2023-10-16 PROCEDURE — 99213 OFFICE O/P EST LOW 20 MIN: CPT

## 2023-10-16 PROCEDURE — 3051F HG A1C>EQUAL 7.0%<8.0%: CPT

## 2023-10-16 PROCEDURE — 1159F MED LIST DOCD IN RCRD: CPT

## 2023-10-16 PROCEDURE — 3079F DIAST BP 80-89 MM HG: CPT

## 2023-10-16 PROCEDURE — 3074F SYST BP LT 130 MM HG: CPT

## 2023-10-16 PROCEDURE — 1160F RVW MEDS BY RX/DR IN RCRD: CPT

## 2023-10-16 RX ORDER — METHYLPREDNISOLONE 4 MG/1
TABLET ORAL
Qty: 21 TABLET | Refills: 0 | Status: SHIPPED | OUTPATIENT
Start: 2023-10-16

## 2023-10-16 NOTE — PROGRESS NOTES
"Chief Complaint  Cough and Sore Throat (Scratchy throat /)    Subjective        Jesus Cuba presents to [facility]IMARY [facility]  History of Present Illness  73-year-old male presenting with hoarseness, sore throat and cough.  Patient of ROBBIE Estrada.  Started having a hoarse voice on his cruise on 10/5.  Returned from a cruise on Saturday 10/7.  Has been treating with expectorant and [place].  Has occasional dry cough.  Sore throat is described as scratchy.  Denies nausea or difficulty swallowing.  Denies fever, shortness of breath, changes in taste or smell, body aches.    Provided patient with a sample of Januvia lot number [id number] with expiration of 11/2025.    Patient states that insurance will cover visit if billed under \"urgently needed out-of-pocket\".  Cough  Associated symptoms include a sore throat.   Sore Throat   Associated symptoms include coughing.       Objective   Vital Signs:  /80 (BP Location: Left arm, Patient Position: Sitting, Cuff Size: Adult)   Pulse 70   Ht 167.6 cm (66\")   Wt 73.6 kg (162 lb 3.2 oz)   SpO2 98%   BMI 26.18 kg/m²   Estimated body mass index is 26.18 kg/m² as calculated from the following:    Height as of this encounter: 167.6 cm (66\").    Weight as of this encounter: 73.6 kg (162 lb 3.2 oz).               Physical Exam  Vitals reviewed.   Constitutional:       Appearance: Normal appearance.   HENT:      Right Ear: Tympanic membrane normal.      Left Ear: Tympanic membrane normal.      Nose: Nose normal.      Mouth/Throat:      Pharynx: Posterior oropharyngeal erythema present.   Musculoskeletal:         General: Normal range of motion.      Cervical back: Normal range of motion.   Skin:     General: Skin is warm and dry.      Capillary Refill: Capillary refill takes less than 2 seconds.   Neurological:      General: No focal deficit present.      Mental Status: He is alert and oriented to person, place, and time.   Psychiatric:        "  Mood and Affect: Mood normal.         Behavior: Behavior normal.         Thought Content: Thought content normal.         Judgment: Judgment normal.        Result Review :    Common labs          5/11/2023    12:18 5/12/2023    04:46 9/1/2023    08:18   Common Labs   Glucose 214  127  158    BUN 24  18  18    Creatinine 1.18  0.84  0.86    Sodium 139  142  142    Potassium 3.9  4.1  4.2    Chloride 106  108  105    Calcium 9.0  8.8  9.0    Total Protein   6.5    Albumin 4.2   4.2    Total Bilirubin 0.4   0.5    Alkaline Phosphatase 71   95    AST (SGOT) 12   12    ALT (SGPT) 17   15    WBC 5.93  7.67     Hemoglobin 13.5  13.3     Hematocrit 41.1  39.5     Platelets 189  180     Total Cholesterol  122     Total Cholesterol   138    Triglycerides  216  228    HDL Cholesterol  36  37    LDL Cholesterol   51  64    Hemoglobin A1C  7.50  7.40          Current Outpatient Medications on File Prior to Visit   Medication Sig Dispense Refill    aspirin 81 MG EC tablet Take 1 tablet by mouth Daily. 90 tablet 3    atorvastatin (LIPITOR) 40 MG tablet Take 1 tablet by mouth Every Night. 90 tablet 3    Canagliflozin (Invokana) 300 MG tablet tablet TAKE 1 TABLET EVERY DAY 30 tablet 1    celecoxib (CeleBREX) 200 MG capsule TAKE 1 CAPSULE EVERY DAY 90 capsule 3    clopidogrel (PLAVIX) 75 MG tablet Take 1 tablet by mouth daily. 90 tablet 1    gabapentin (NEURONTIN) 300 MG capsule TAKE 1 CAPSULE THREE TIMES DAILY AS NEEDED FOR PAIN (Patient taking differently: Take 2 capsules by mouth Daily With Dinner.) 270 capsule 1    irbesartan (AVAPRO) 300 MG tablet TAKE 1 TABLET EVERY DAY 90 tablet 3    isosorbide mononitrate (IMDUR) 30 MG 24 hr tablet Take 1 tablet by mouth Daily.  1    levothyroxine (SYNTHROID, LEVOTHROID) 75 MCG tablet TAKE 1 TABLET EVERY DAY (Patient taking differently: Take 1 tablet by mouth Every Morning.) 90 tablet 3    metFORMIN (GLUCOPHAGE) 1000 MG tablet TAKE 1 TABLET TWICE DAILY WITH MEALS 180 tablet 1    metoprolol  succinate XL (TOPROL-XL) 25 MG 24 hr tablet TAKE 1/2 TABLET EVERY DAY 45 tablet 3    NITROSTAT 0.4 MG SL tablet DISSOLVE ONE TABLET UNDER THE TONGUE AS NEEDED (Patient taking differently: Place 1 tablet under the tongue Every 5 (Five) Minutes As Needed.) 25 tablet 0    SITagliptin (Januvia) 100 MG tablet Take 1 tablet by mouth Daily. 90 tablet 2     No current facility-administered medications on file prior to visit.              Assessment and Plan   Diagnoses and all orders for this visit:    1. Dry cough (Primary)  -     POCT SARS-CoV-2 Antigen ISAIAH + Flu  -     methylPREDNISolone (MEDROL) 4 MG dose pack; Take as directed on package instructions.  Dispense: 21 tablet; Refill: 0    2. Sore throat  -     POCT SARS-CoV-2 Antigen ISAIAH + Flu  -     methylPREDNISolone (MEDROL) 4 MG dose pack; Take as directed on package instructions.  Dispense: 21 tablet; Refill: 0    3. Hoarseness  -     POCT SARS-CoV-2 Antigen ISAIAH + Flu  -     methylPREDNISolone (MEDROL) 4 MG dose pack; Take as directed on package instructions.  Dispense: 21 tablet; Refill: 0      Patient Instructions   Take steroid as directed. Stay hydrated with water. Rest. Chloraseptic spray for throat. Isolate for 5 days. Then wear a mask around others for 5 days.              Follow Up   Return if symptoms worsen or fail to improve.  Patient was given instructions and counseling regarding his condition or for health maintenance advice. Please see specific information pulled into the AVS if appropriate.

## 2023-10-16 NOTE — PATIENT INSTRUCTIONS
Take steroid as directed. Stay hydrated with water. Rest. Chloraseptic spray for throat. Isolate for 5 days. Then wear a mask around others for 5 days.

## 2023-12-11 DIAGNOSIS — G58.9 MONONEUROPATHY: Chronic | ICD-10-CM

## 2023-12-12 RX ORDER — ATORVASTATIN CALCIUM 40 MG/1
40 TABLET, FILM COATED ORAL NIGHTLY
Qty: 90 TABLET | Refills: 3 | Status: SHIPPED | OUTPATIENT
Start: 2023-12-12

## 2023-12-12 RX ORDER — GABAPENTIN 300 MG/1
CAPSULE ORAL
Qty: 270 CAPSULE | Refills: 0 | Status: SHIPPED | OUTPATIENT
Start: 2023-12-12

## 2023-12-12 NOTE — TELEPHONE ENCOUNTER
Rx Refill Note  Requested Prescriptions     Pending Prescriptions Disp Refills    gabapentin (NEURONTIN) 300 MG capsule [Pharmacy Med Name: GABAPENTIN 300 MG Capsule] 270 capsule 0     Sig: TAKE 1 CAPSULE THREE TIMES DAILY AS NEEDED FOR PAIN      Last office visit with prescribing clinician: 8/30/2023   Last telemedicine visit with prescribing clinician: Visit date not found   Next office visit with prescribing clinician: 2/1/2024         Tamika Medina MA  12/12/23, 08:30 EST

## 2024-01-05 DIAGNOSIS — M15.9 PRIMARY OSTEOARTHRITIS INVOLVING MULTIPLE JOINTS: Chronic | ICD-10-CM

## 2024-01-05 DIAGNOSIS — I10 ESSENTIAL HYPERTENSION: Chronic | ICD-10-CM

## 2024-01-05 DIAGNOSIS — E11.65 TYPE 2 DIABETES MELLITUS WITH HYPERGLYCEMIA, WITHOUT LONG-TERM CURRENT USE OF INSULIN: Chronic | ICD-10-CM

## 2024-01-05 DIAGNOSIS — G58.9 MONONEUROPATHY: Chronic | ICD-10-CM

## 2024-01-05 DIAGNOSIS — E11.9 DIABETES MELLITUS WITHOUT COMPLICATION: ICD-10-CM

## 2024-01-05 DIAGNOSIS — E03.4 HYPOTHYROIDISM DUE TO ACQUIRED ATROPHY OF THYROID: ICD-10-CM

## 2024-01-05 RX ORDER — CELECOXIB 200 MG/1
200 CAPSULE ORAL DAILY
Qty: 90 CAPSULE | Refills: 0 | Status: SHIPPED | OUTPATIENT
Start: 2024-01-05

## 2024-01-05 RX ORDER — GABAPENTIN 300 MG/1
CAPSULE ORAL
Qty: 270 CAPSULE | Refills: 0 | Status: SHIPPED | OUTPATIENT
Start: 2024-01-05

## 2024-01-05 RX ORDER — CLOPIDOGREL BISULFATE 75 MG/1
75 TABLET ORAL DAILY
Qty: 90 TABLET | Refills: 0 | Status: SHIPPED | OUTPATIENT
Start: 2024-01-05

## 2024-01-05 RX ORDER — CANAGLIFLOZIN 300 MG/1
1 TABLET, FILM COATED ORAL DAILY
Qty: 90 TABLET | Refills: 0 | Status: SHIPPED | OUTPATIENT
Start: 2024-01-05

## 2024-01-05 RX ORDER — ISOSORBIDE MONONITRATE 30 MG/1
30 TABLET, EXTENDED RELEASE ORAL DAILY
Refills: 1 | OUTPATIENT
Start: 2024-01-05

## 2024-01-05 RX ORDER — LEVOTHYROXINE SODIUM 0.07 MG/1
75 TABLET ORAL DAILY
Qty: 90 TABLET | Refills: 3 | Status: SHIPPED | OUTPATIENT
Start: 2024-01-05

## 2024-01-05 RX ORDER — IRBESARTAN 300 MG/1
300 TABLET ORAL DAILY
Qty: 90 TABLET | Refills: 1 | Status: SHIPPED | OUTPATIENT
Start: 2024-01-05

## 2024-01-05 NOTE — TELEPHONE ENCOUNTER
Caller: Cuba, Jesus L    Relationship: Self    Best call back number:[phone] (Home)     Requested Prescriptions:   gabapentin (NEURONTIN) 300 MG capsule     atorvastatin (LIPITOR) 40 MG tablet     methylPREDNISolone (MEDROL) 4 MG dose pack     metoprolol succinate XL (TOPROL-XL) 25 MG 24 hr tablet     metFORMIN (GLUCOPHAGE) 1000 MG tablet     irbesartan (AVAPRO) 300 MG tablet     Canagliflozin (Invokana) 300 MG tablet tablet     SITagliptin (Januvia) 100 MG tablet     levothyroxine (SYNTHROID, LEVOTHROID) 75 MCG tablet     isosorbide mononitrate (IMDUR) 30 MG 24 hr tablet      NITROSTAT 0.4 MG SL tablet       clopidogrel (PLAVIX) 75 MG tablet       Pharmacy where request should be sent:  [facility] [address] [phone] [facility] [phone]      Last office visit with prescribing clinician: 8/30/2023   Last telemedicine visit with prescribing clinician: Visit date not found   Next office visit with prescribing clinician: 2/1/2024     Additional details provided by patient: PATIENT CALLED TO REQUEST A MEDICATION REFILL ON MEDICATION WITH A 90 DAY SUPPLY. PATIENT HAS A 3 DAY SUPPLY LEFT.      Does the patient have less than a 3 day supply:  [] Yes  [] No    Would you like a call back once the refill request has been completed: [] Yes [] No    If the office needs to give you a call back, can they leave a voicemail: [] Yes [] No    Lina Carey Rep   01/05/24 11:34 EST         DELETE AFTER READING TO PATIENT: “Thank you for sharing this information with me. I will send a message to the clinical team. Please allow 48 hours for the clinical staff to follow up on this request.”

## 2024-01-05 NOTE — TELEPHONE ENCOUNTER
Rx Refill Note  Requested Prescriptions     Pending Prescriptions Disp Refills    SITagliptin (Januvia) 100 MG tablet 90 tablet 2     Sig: Take 1 tablet by mouth Daily.    Canagliflozin (Invokana) 300 MG tablet tablet 30 tablet 1     Sig: Take 1 tablet by mouth Daily.    metFORMIN (GLUCOPHAGE) 1000 MG tablet 180 tablet 1     Sig: Take 1 tablet by mouth 2 (Two) Times a Day With Meals.    levothyroxine (SYNTHROID, LEVOTHROID) 75 MCG tablet 90 tablet 3     Sig: Take 1 tablet by mouth Daily.    gabapentin (NEURONTIN) 300 MG capsule 270 capsule 0    clopidogrel (PLAVIX) 75 MG tablet 90 tablet 1     Sig: Take 1 tablet by mouth Daily.    celecoxib (CeleBREX) 200 MG capsule 90 capsule 3     Sig: Take 1 capsule by mouth Daily.    irbesartan (AVAPRO) 300 MG tablet 90 tablet 3     Sig: Take 1 tablet by mouth Daily.    isosorbide mononitrate (IMDUR) 30 MG 24 hr tablet  1     Sig: Take 1 tablet by mouth Daily.      Last office visit with prescribing clinician: 8/30/2023  Next office visit with prescribing clinician: 2/1/2024         Tamika Medina MA  01/05/24, 13:05 EST

## 2024-01-09 DIAGNOSIS — E11.65 TYPE 2 DIABETES MELLITUS WITH HYPERGLYCEMIA, WITHOUT LONG-TERM CURRENT USE OF INSULIN: Chronic | ICD-10-CM

## 2024-01-09 DIAGNOSIS — E11.9 DIABETES MELLITUS WITHOUT COMPLICATION: ICD-10-CM

## 2024-01-09 DIAGNOSIS — E03.4 HYPOTHYROIDISM DUE TO ACQUIRED ATROPHY OF THYROID: ICD-10-CM

## 2024-01-09 DIAGNOSIS — G58.9 MONONEUROPATHY: Chronic | ICD-10-CM

## 2024-01-09 DIAGNOSIS — I10 ESSENTIAL HYPERTENSION: Chronic | ICD-10-CM

## 2024-01-09 DIAGNOSIS — M15.9 PRIMARY OSTEOARTHRITIS INVOLVING MULTIPLE JOINTS: Chronic | ICD-10-CM

## 2024-01-09 RX ORDER — CANAGLIFLOZIN 300 MG/1
1 TABLET, FILM COATED ORAL DAILY
Qty: 90 TABLET | Refills: 0 | Status: SHIPPED | OUTPATIENT
Start: 2024-01-09

## 2024-01-09 RX ORDER — METOPROLOL SUCCINATE 25 MG/1
12.5 TABLET, EXTENDED RELEASE ORAL DAILY
Qty: 45 TABLET | Refills: 3 | OUTPATIENT
Start: 2024-01-09

## 2024-01-09 RX ORDER — LEVOTHYROXINE SODIUM 0.07 MG/1
75 TABLET ORAL DAILY
Qty: 90 TABLET | Refills: 1 | Status: SHIPPED | OUTPATIENT
Start: 2024-01-09

## 2024-01-09 RX ORDER — CLOPIDOGREL BISULFATE 75 MG/1
75 TABLET ORAL DAILY
Qty: 90 TABLET | Refills: 0 | Status: SHIPPED | OUTPATIENT
Start: 2024-01-09

## 2024-01-09 RX ORDER — IRBESARTAN 300 MG/1
300 TABLET ORAL DAILY
Qty: 90 TABLET | Refills: 1 | Status: SHIPPED | OUTPATIENT
Start: 2024-01-09

## 2024-01-09 RX ORDER — GABAPENTIN 300 MG/1
CAPSULE ORAL
Qty: 270 CAPSULE | Refills: 0 | Status: SHIPPED | OUTPATIENT
Start: 2024-01-09

## 2024-01-09 RX ORDER — ISOSORBIDE MONONITRATE 30 MG/1
30 TABLET, EXTENDED RELEASE ORAL DAILY
Qty: 90 TABLET | Refills: 1 | OUTPATIENT
Start: 2024-01-09

## 2024-01-09 RX ORDER — ATORVASTATIN CALCIUM 40 MG/1
40 TABLET, FILM COATED ORAL NIGHTLY
Qty: 90 TABLET | Refills: 1 | Status: SHIPPED | OUTPATIENT
Start: 2024-01-09

## 2024-01-10 ENCOUNTER — TELEPHONE (OUTPATIENT)
Dept: INTERNAL MEDICINE | Facility: CLINIC | Age: 74
End: 2024-01-10
Payer: MEDICARE

## 2024-01-10 DIAGNOSIS — E03.4 HYPOTHYROIDISM DUE TO ACQUIRED ATROPHY OF THYROID: ICD-10-CM

## 2024-01-10 DIAGNOSIS — Z12.5 SCREENING FOR PROSTATE CANCER: ICD-10-CM

## 2024-01-10 DIAGNOSIS — I10 ESSENTIAL HYPERTENSION: ICD-10-CM

## 2024-01-10 DIAGNOSIS — E11.65 TYPE 2 DIABETES MELLITUS WITH HYPERGLYCEMIA, WITHOUT LONG-TERM CURRENT USE OF INSULIN: Primary | ICD-10-CM

## 2024-01-10 DIAGNOSIS — E78.5 HYPERLIPIDEMIA LDL GOAL <70: ICD-10-CM

## 2024-01-10 NOTE — TELEPHONE ENCOUNTER
Patient called back and is asking for a lab order to be put in so he can come in a week before his appointment.  Can you order at your convince and I will call him back to schedule.  Thanks

## 2024-01-10 NOTE — TELEPHONE ENCOUNTER
Please call him:     Mr. Cuba,     I have placed a lab order for you at our outpatient [facility].     It is located on the first floor of our building at:   [address] [address]    You do not need an appointment.   It is open from Monday - Friday (except holidays) during normal business hours.   Generally 7:30am to 4pm.  They do close for 1/2 hour during lunch.     [x] You should be fasting: you may have water on the day of the lab.   Please ensure the meal the night before is lite and low fat.  No alcohol the night before.         Darien Lowe

## 2024-01-12 NOTE — TELEPHONE ENCOUNTER
----- Message from ROBBIE Stone III sent at 12/5/2022  8:15 AM EST -----  [facility] [address]: need last diabetes eye exam    
Called [facility] on [facility] and talked to shyam and they are faxing report over.   
12-Jan-2024

## 2024-01-29 ENCOUNTER — LAB (OUTPATIENT)
Facility: HOSPITAL | Age: 74
End: 2024-01-29
Payer: MEDICARE

## 2024-01-29 DIAGNOSIS — E78.5 HYPERLIPIDEMIA LDL GOAL <70: ICD-10-CM

## 2024-01-29 DIAGNOSIS — Z12.5 SCREENING FOR PROSTATE CANCER: ICD-10-CM

## 2024-01-29 DIAGNOSIS — E03.4 HYPOTHYROIDISM DUE TO ACQUIRED ATROPHY OF THYROID: ICD-10-CM

## 2024-01-29 DIAGNOSIS — E11.65 TYPE 2 DIABETES MELLITUS WITH HYPERGLYCEMIA, WITHOUT LONG-TERM CURRENT USE OF INSULIN: ICD-10-CM

## 2024-01-29 LAB
ALBUMIN SERPL-MCNC: 4.4 G/DL (ref 3.5–5.2)
ALBUMIN UR-MCNC: <1.2 MG/DL
ALBUMIN/GLOB SERPL: 1.9 G/DL
ALP SERPL-CCNC: 80 U/L (ref 39–117)
ALT SERPL W P-5'-P-CCNC: 19 U/L (ref 1–41)
ANION GAP SERPL CALCULATED.3IONS-SCNC: 9 MMOL/L (ref 5–15)
AST SERPL-CCNC: 11 U/L (ref 1–40)
BILIRUB SERPL-MCNC: 0.3 MG/DL (ref 0–1.2)
BUN SERPL-MCNC: 19 MG/DL (ref 8–23)
BUN/CREAT SERPL: 19.8 (ref 7–25)
CALCIUM SPEC-SCNC: 9.4 MG/DL (ref 8.6–10.5)
CHLORIDE SERPL-SCNC: 106 MMOL/L (ref 98–107)
CHOLEST SERPL-MCNC: 146 MG/DL (ref 0–200)
CO2 SERPL-SCNC: 25 MMOL/L (ref 22–29)
CREAT SERPL-MCNC: 0.96 MG/DL (ref 0.76–1.27)
CREAT UR-MCNC: 76.6 MG/DL
DEPRECATED RDW RBC AUTO: 41.2 FL (ref 37–54)
EGFRCR SERPLBLD CKD-EPI 2021: 84 ML/MIN/1.73
ERYTHROCYTE [DISTWIDTH] IN BLOOD BY AUTOMATED COUNT: 13.4 % (ref 12.3–15.4)
GLOBULIN UR ELPH-MCNC: 2.3 GM/DL
GLUCOSE SERPL-MCNC: 132 MG/DL (ref 65–99)
HBA1C MFR BLD: 8.1 % (ref 4.8–5.6)
HCT VFR BLD AUTO: 41.9 % (ref 37.5–51)
HDLC SERPL-MCNC: 37 MG/DL (ref 40–60)
HGB BLD-MCNC: 14.1 G/DL (ref 13–17.7)
LDLC SERPL CALC-MCNC: 78 MG/DL (ref 0–100)
LDLC/HDLC SERPL: 1.98 {RATIO}
MCH RBC QN AUTO: 28.6 PG (ref 26.6–33)
MCHC RBC AUTO-ENTMCNC: 33.7 G/DL (ref 31.5–35.7)
MCV RBC AUTO: 85 FL (ref 79–97)
MICROALBUMIN/CREAT UR: NORMAL MG/G{CREAT}
PLATELET # BLD AUTO: 217 10*3/MM3 (ref 140–450)
PMV BLD AUTO: 10.1 FL (ref 6–12)
POTASSIUM SERPL-SCNC: 4 MMOL/L (ref 3.5–5.2)
PROT SERPL-MCNC: 6.7 G/DL (ref 6–8.5)
PSA SERPL-MCNC: 3.22 NG/ML (ref 0–4)
RBC # BLD AUTO: 4.93 10*6/MM3 (ref 4.14–5.8)
SODIUM SERPL-SCNC: 140 MMOL/L (ref 136–145)
TRIGL SERPL-MCNC: 179 MG/DL (ref 0–150)
TSH SERPL DL<=0.05 MIU/L-ACNC: 2.8 UIU/ML (ref 0.27–4.2)
VLDLC SERPL-MCNC: 31 MG/DL (ref 5–40)
WBC NRBC COR # BLD AUTO: 7.64 10*3/MM3 (ref 3.4–10.8)

## 2024-01-29 PROCEDURE — 85027 COMPLETE CBC AUTOMATED: CPT

## 2024-01-29 PROCEDURE — 80053 COMPREHEN METABOLIC PANEL: CPT

## 2024-01-29 PROCEDURE — 84443 ASSAY THYROID STIM HORMONE: CPT

## 2024-01-29 PROCEDURE — G0103 PSA SCREENING: HCPCS

## 2024-01-29 PROCEDURE — 80061 LIPID PANEL: CPT

## 2024-01-29 PROCEDURE — 82570 ASSAY OF URINE CREATININE: CPT

## 2024-01-29 PROCEDURE — 83036 HEMOGLOBIN GLYCOSYLATED A1C: CPT

## 2024-01-29 PROCEDURE — 82043 UR ALBUMIN QUANTITATIVE: CPT

## 2024-01-29 PROCEDURE — 36415 COLL VENOUS BLD VENIPUNCTURE: CPT

## 2024-02-01 ENCOUNTER — OFFICE VISIT (OUTPATIENT)
Dept: INTERNAL MEDICINE | Facility: CLINIC | Age: 74
End: 2024-02-01
Payer: MEDICARE

## 2024-02-01 VITALS
HEIGHT: 65 IN | WEIGHT: 165 LBS | DIASTOLIC BLOOD PRESSURE: 80 MMHG | HEART RATE: 63 BPM | OXYGEN SATURATION: 99 % | SYSTOLIC BLOOD PRESSURE: 128 MMHG | BODY MASS INDEX: 27.49 KG/M2

## 2024-02-01 DIAGNOSIS — E78.5 HYPERLIPIDEMIA LDL GOAL <70: Chronic | ICD-10-CM

## 2024-02-01 DIAGNOSIS — Z00.00 MEDICARE ANNUAL WELLNESS VISIT, SUBSEQUENT: Primary | ICD-10-CM

## 2024-02-01 DIAGNOSIS — E11.65 TYPE 2 DIABETES MELLITUS WITH HYPERGLYCEMIA, WITHOUT LONG-TERM CURRENT USE OF INSULIN: Chronic | ICD-10-CM

## 2024-02-01 DIAGNOSIS — N40.1 BPH ASSOCIATED WITH NOCTURIA: Chronic | ICD-10-CM

## 2024-02-01 DIAGNOSIS — I73.9 PVD (PERIPHERAL VASCULAR DISEASE): Chronic | ICD-10-CM

## 2024-02-01 DIAGNOSIS — F17.211 CIGARETTE NICOTINE DEPENDENCE IN REMISSION: ICD-10-CM

## 2024-02-01 DIAGNOSIS — G58.9 MONONEUROPATHY: Chronic | ICD-10-CM

## 2024-02-01 DIAGNOSIS — E03.4 HYPOTHYROIDISM DUE TO ACQUIRED ATROPHY OF THYROID: Chronic | ICD-10-CM

## 2024-02-01 DIAGNOSIS — F41.9 ANXIETY: Chronic | ICD-10-CM

## 2024-02-01 DIAGNOSIS — I10 ESSENTIAL HYPERTENSION: Chronic | ICD-10-CM

## 2024-02-01 DIAGNOSIS — R35.1 BPH ASSOCIATED WITH NOCTURIA: Chronic | ICD-10-CM

## 2024-02-01 DIAGNOSIS — I25.10 CORONARY ARTERY DISEASE INVOLVING NATIVE CORONARY ARTERY OF NATIVE HEART WITHOUT ANGINA PECTORIS: Chronic | ICD-10-CM

## 2024-02-01 RX ORDER — ALPRAZOLAM 0.5 MG/1
0.5 TABLET ORAL 2 TIMES DAILY PRN
Qty: 60 TABLET | Refills: 0 | Status: SHIPPED | OUTPATIENT
Start: 2024-02-01

## 2024-02-01 NOTE — PATIENT INSTRUCTIONS
Counseled patient that his goal for A1c is to be less than 7%.  Counseled patient on overall risk for diabetes related to long-term complications multiple small and large blood vessels and at the risk of CAD, MI, and stroke is much higher than with general elevation.  Counseled patient on long-term effects of prolonged poor control of his diabetes with the goal of preventing future complications.     We should continue to focus on aggressive risk factor modification including achieving and maintaining appropriate blood pressure, cholesterol and diabetes management. This would include a goal BP <135/85, BMI 18.5-24.9, HDL goal >40, LDL <70, Hemoglobin A1c<6% in addition to a heart healthy diet, and a goal of at least 30 minutes of routine aerobic activity 5-7 days a week.         Medicare Wellness  Personal Prevention Plan of Service     Date of Office Visit:    Encounter Provider:  Rigoberto Lowe III NP-C  Place of Service:  [facility]  Patient Name: Jesus Cuba  :  1950    As part of the Medicare Wellness portion of your visit today, we are providing you with this personalized preventive plan of services (PPPS). This plan is based upon recommendations of the [place] Preventive Services Task Force (USPSTF) and the Advisory Committee on Immunization Practices (ACIP).    This lists the preventive care services that should be considered, and provides dates of when you are due. Items listed as completed are up-to-date and do not require any further intervention.    Health Maintenance   Topic Date Due    ZOSTER VACCINE (1 of 2) Never done    DIABETIC EYE EXAM  2023    DIABETIC FOOT EXAM  10/19/2023    ANNUAL WELLNESS VISIT  2023    COVID-19 Vaccine (3 - -24 season) 2024 (Originally 2023)    INFLUENZA VACCINE  2024 (Originally 2023)    Pneumococcal Vaccine 65+ (1 of 2 - PCV) 2025 (Originally 1956)    HEMOGLOBIN  A1C  07/29/2024    LIPID PANEL  01/29/2025    URINE MICROALBUMIN  01/29/2025    BMI FOLLOWUP  02/01/2025    COLORECTAL CANCER SCREENING  08/15/2026    HEPATITIS C SCREENING  Completed    TDAP/TD VACCINES  Discontinued       Orders Placed This Encounter   Procedures    CT Chest Low Dose Wo     Standing Status:   Future     Standing Expiration Date:   2/1/2025     Order Specific Question:   The patient is age 50-80 (Medicare coverage 50-77)     Answer:   73     Order Specific Question:   The patient is a current smoker?     Answer:   No     Order Specific Question:   The patient is a former smoker who has quit within the last 15 years?     Answer:   Yes     Order Specific Question:   The number of years since quitting smoking.     Answer:   14     Order Specific Question:   The patient has a smoking history of 20 pack-years or greater:     Answer:   Yes     Order Specific Question:   Actual pack - year smoking history (number):     Answer:   20     Order Specific Question:   Does the patient have any clinical signs/symptoms of lung cancer?     Answer:   No     Order Specific Question:   The patient was engaged in shared decision-making for this test:     Answer:   Yes     Order Specific Question:   Release to patient     Answer:   Routine Release [[id number]]       Return in about 6 months (around 8/1/2024).

## 2024-02-01 NOTE — PROGRESS NOTES
The ABCs of the Annual Wellness Visit  Subsequent Medicare Wellness Visit    Subjective    Jesus Cuba is a 73 y.o. male who presents for a Subsequent Medicare Wellness Visit.    The following portions of the patient's history were reviewed and   updated as appropriate: allergies, current medications, past family history, past medical history, past social history, past surgical history, and problem list.    Wt Readings from Last 4 Encounters:   02/01/24 74.8 kg (165 lb)   10/16/23 73.6 kg (162 lb 3.2 oz)   09/13/23 73 kg (161 lb)   08/30/23 75.4 kg (166 lb 3.2 oz)       Weight trend is worsening.    His cardiovascular risks are:    [] No Known risk factors  [x] Known CAD and being treated     [] Hypertension    [] Hyperlipidemia  [] Diabetes     [] Obesity  [] Family history    [] Current or hx tobacco use  [] Sedentary lifestyle       Male:   [] Testosterone use     Mobility    [x] Ambulates independently  [] Ambulates with cane   [] Ambulates with quad cane  [] Ambulates with rollator   [] Ambulates with walker   [] Mobile with wheelchair   [] Mobile with electric wheelchair     Continence    [x] Continent of bowel and bladder  [] Incontinent bowel and bladder   [] Incontinent bladder   [] Incontinent bowel        Prostate cancer screen:   Lab Results   Component Value Date    PSA 3.220 01/29/2024    PSA 1.510 10/19/2022    PSA 1.210 02/15/2021   States had had intercourse just before last test.  No change in urination.         Compared to one year ago, the patient feels his physical   health is the same.    Compared to one year ago, the patient feels his mental   health is the same.    Recent Hospitalizations:  He was admitted within the past 365 days at [facility].       Current Medical Providers:  Patient Care Team:  Rigoberto Lowe III, NP-C as PCP - General (Family Medicine)  Chinyere Sprague MD as Consulting Physician (Cardiology)  Shubham Escobedo MD as Surgeon  (Vascular Surgery)    Outpatient Medications Prior to Visit   Medication Sig Dispense Refill    aspirin 81 MG EC tablet Take 1 tablet by mouth Daily. 90 tablet 3    atorvastatin (LIPITOR) 40 MG tablet Take 1 tablet by mouth Every Night. 90 tablet 1    Canagliflozin (Invokana) 300 MG tablet tablet Take 1 tablet by mouth Daily. 90 tablet 0    clopidogrel (PLAVIX) 75 MG tablet Take 1 tablet by mouth Daily. 90 tablet 0    gabapentin (NEURONTIN) 300 MG capsule One capsule three times a day as needed for pain. 270 capsule 0    irbesartan (AVAPRO) 300 MG tablet Take 1 tablet by mouth Daily. 90 tablet 1    levothyroxine (SYNTHROID, LEVOTHROID) 75 MCG tablet Take 1 tablet by mouth Daily. 90 tablet 1    metFORMIN (GLUCOPHAGE) 1000 MG tablet Take 1 tablet by mouth 2 (Two) Times a Day With Meals. 180 tablet 1    metoprolol succinate XL (TOPROL-XL) 25 MG 24 hr tablet TAKE 1/2 TABLET EVERY DAY 45 tablet 3    NITROSTAT 0.4 MG SL tablet DISSOLVE ONE TABLET UNDER THE TONGUE AS NEEDED (Patient taking differently: Place 1 tablet under the tongue Every 5 (Five) Minutes As Needed.) 25 tablet 0    SITagliptin (Januvia) 100 MG tablet Take 1 tablet by mouth Daily. 90 tablet 2    celecoxib (CeleBREX) 200 MG capsule Take 1 capsule by mouth Daily. (Patient not taking: Reported on 2/1/2024) 90 capsule 0    isosorbide mononitrate (IMDUR) 30 MG 24 hr tablet Take 1 tablet by mouth Daily. (Patient not taking: Reported on 2/1/2024)  1     No facility-administered medications prior to visit.       No opioid medication identified on active medication list. I have reviewed chart for other potential  high risk medication/s and harmful drug interactions in the elderly.        Aspirin is on active medication list. Aspirin use is indicated based on review of current medical condition/s. Pros and cons of this therapy have been discussed today. Benefits of this medication outweigh potential harm.  Patient has been encouraged to continue taking this  "medication.  .      Patient Active Problem List   Diagnosis    Essential hypertension    Hypothyroidism    Anxiety    BPH associated with nocturia    History of ASCVD    Nicotine dependence in remission    Vasculogenic erectile dysfunction    Umbilical hernia    Type 2 diabetes mellitus with hyperglycemia, without long-term current use of insulin    Hyperlipidemia LDL goal <70    CAD (coronary artery disease)    S/P right coronary artery (RCA) stent placement    History of myocardial infarction    PN (peripheral neuropathy)    Medication management contract signed    PVD (peripheral vascular disease)    Vitamin D deficiency    OA (osteoarthritis)    Exertional dyspnea    Status post insertion of drug-eluting stent into left anterior descending (LAD) artery     Advance Care Planning  Advance Directive is not on file.  ACP discussion was held with the patient during this visit. Patient has an advance directive (not in EMR), copy requested.     Objective    Vitals:    02/01/24 0838   BP: 128/80   BP Location: Left arm   Patient Position: Sitting   Cuff Size: Adult   Pulse: 63   SpO2: 99%   Weight: 74.8 kg (165 lb)   Height: 165.1 cm (65\")   PainSc: 0-No pain     Estimated body mass index is 27.46 kg/m² as calculated from the following:    Height as of this encounter: 165.1 cm (65\").    Weight as of this encounter: 74.8 kg (165 lb).    BMI is >= 25 and <30. (Overweight) The following options were offered after discussion;: nutrition counseling/recommendations      Does the patient have evidence of cognitive impairment? No    Lab Results   Component Value Date    TRIG 179 (H) 01/29/2024    HDL 37 (L) 01/29/2024    LDL 78 01/29/2024    VLDL 31 01/29/2024    HGBA1C 8.10 (H) 01/29/2024        HEALTH RISK ASSESSMENT    Smoking Status:  Social History     Tobacco Use   Smoking Status Never   Smokeless Tobacco Never     Alcohol Consumption:  Social History     Substance and Sexual Activity   Alcohol Use Yes    Alcohol/week: " 2.0 standard drinks of alcohol    Types: 2 Shots of liquor per week    Comment: 2 drinks per week     Fall Risk Screen:    KALA Fall Risk Assessment was completed, and patient is at LOW risk for falls.Assessment completed on:2024    Depression Screenin/1/2024     8:39 AM   PHQ-2/PHQ-9 Depression Screening   Little Interest or Pleasure in Doing Things 0-->not at all   Feeling Down, Depressed or Hopeless 0-->not at all   PHQ-9: Brief Depression Severity Measure Score 0       Health Habits and Functional and Cognitive Screenin/1/2024     8:39 AM   Functional & Cognitive Status   Do you have difficulty preparing food and eating? No   Do you have difficulty bathing yourself, getting dressed or grooming yourself? No   Do you have difficulty using the toilet? No   Do you have difficulty moving around from place to place? No   Do you have trouble with steps or getting out of a bed or a chair? No   Current Diet Well Balanced Diet   Dental Exam Up to date   Eye Exam Up to date   Exercise (times per week) 5 times per week   Current Exercises Include Walking   Do you need help using the phone?  No   Are you deaf or do you have serious difficulty hearing?  No   Do you need help to go to places out of walking distance? No   Do you need help shopping? No   Do you need help preparing meals?  No   Do you need help with housework?  No   Do you need help with laundry? No   Do you need help taking your medications? No   Do you need help managing money? No   Do you ever drive or ride in a car without wearing a seat belt? Yes   Have you felt unusual stress, anger or loneliness in the last month? No   Who do you live with? Alone   If you need help, do you have trouble finding someone available to you? No   Have you been bothered in the last four weeks by sexual problems? No   Do you have difficulty concentrating, remembering or making decisions? No       Age-appropriate Screening Schedule:  Refer to the list below  for future screening recommendations based on patient's age, sex and/or medical conditions. Orders for these recommended tests are listed in the plan section. The patient has been provided with a written plan.    Health Maintenance   Topic Date Due    ZOSTER VACCINE (1 of 2) Never done    DIABETIC EYE EXAM  06/27/2023    ANNUAL WELLNESS VISIT  12/05/2023    COVID-19 Vaccine (3 - 2023-24 season) 02/03/2024 (Originally 9/1/2023)    INFLUENZA VACCINE  03/31/2024 (Originally 8/1/2023)    Pneumococcal Vaccine 65+ (1 of 2 - PCV) 02/01/2025 (Originally 5/5/1956)    HEMOGLOBIN A1C  07/29/2024    LIPID PANEL  01/29/2025    URINE MICROALBUMIN  01/29/2025    DIABETIC FOOT EXAM  02/01/2025    BMI FOLLOWUP  02/01/2025    COLORECTAL CANCER SCREENING  08/15/2026    HEPATITIS C SCREENING  Completed    TDAP/TD VACCINES  Discontinued                CMS Preventative Services Quick Reference  Risk Factors Identified During Encounter  Immunizations Discussed/Encouraged:  declines       The above risks/problems have been discussed with the patient.  Pertinent information has been shared with the patient in the After Visit Summary.  An After Visit Summary and PPPS were made available to the patient.    Follow Up:   Next Medicare Wellness visit to be scheduled in 1 year.       Additional E&M Note during same encounter follows:  Patient has multiple medical problems which are significant and separately identifiable that require additional work above and beyond the Medicare Wellness Visit.      Chief Complaint  Medicare Wellness-subsequent    Subjective        Jesus Cuba is also being seen today for AWV and follow up chronic conditions:  hypertension, HLD, DMII w PN, CAD (stent to LAD 7/2018), GERD, Anxiety, hypothyroid, PVD (left lower: Dr Escobedo)        Over the summer he states he was having to sit down twice when he was mowing the grass.  He was admitted and discharged in May for exertional dyspnea: He underwent LHC on 5/11/23  "which revealed severe multivessel disease and after discussion, he decided against CABG so PCI to LAD and D1 was performed.  Dr Asher.   States he is doing much better and with no shortness of breath.     Hypertension:   He continues Avapro 300 mg, Toprol 12.5 mg daily  No shortness of breath or chest pain.     CAD: Continues ASA, Plavix, statin (he was changed from pravachol to lipitor and states is tolerating this time: no myalgia)   he had follow up with cardiology on 9/13/23  Office Visit with Ale Espinoza APRN (09/13/2023)   DAPT lifelong    States when walks: he gets dull ache left upper chest and when he stops, it stops.  He has discussed with cardiology.   He has not been taking imdur for several years.   At his last visit: he was to contact Dr Asher and let him know if he was taking medication and states he did not know.  He asked me to send a message to Dr Asher and notify him he is not taking.  I did so: it has gone to covering provider as he is out of the office.       DM: Not controlled.    Continues metformin, Januvia 100 mg, Invokana 300 mg daily  States compliant with medications.   Non-compliant with diet.   States it is quality of life and he wants to eat what he likes.   Recall of diet: carb heavy  Potatoes, biscuits: eats out most meals.   Chicken: fried \"not doing any baked chicken\"   Spaghetti        Hypothyroid continues Synthroid 75 mcg daily      Needs repeat lung cancer screen in the summer.   No cough/ unexplained weight loss.       Objective   Vital Signs:  /80 (BP Location: Left arm, Patient Position: Sitting, Cuff Size: Adult)   Pulse 63   Ht 165.1 cm (65\")   Wt 74.8 kg (165 lb)   SpO2 99%   BMI 27.46 kg/m²     Physical Exam  Vitals reviewed.   Constitutional:       Appearance: Normal appearance. He is well-developed. He is obese.   Neck:      Thyroid: No thyromegaly.   Cardiovascular:      Rate and Rhythm: Normal rate and regular rhythm.      Pulses: Normal pulses.      " Heart sounds: Normal heart sounds.   Pulmonary:      Effort: Pulmonary effort is normal.      Breath sounds: Normal breath sounds.      Comments: E/U   Abdominal:      General: Bowel sounds are normal.   Musculoskeletal:      Cervical back: Normal range of motion and neck supple.      Right lower leg: No edema.      Left lower leg: No edema.   Feet:      Right foot:      Protective Sensation: 5 sites tested.  5 sites sensed.      Skin integrity: Skin integrity normal.      Left foot:      Protective Sensation: 5 sites tested.  5 sites sensed.      Skin integrity: Skin integrity normal.      Comments: Diabetic Foot Exam Performed and Monofilament Test Performed     Lymphadenopathy:      Cervical: No cervical adenopathy.   Skin:     General: Skin is warm and dry.      Capillary Refill: Capillary refill takes 2 to 3 seconds.   Neurological:      Mental Status: He is alert and oriented to person, place, and time.   Psychiatric:         Mood and Affect: Mood normal.         Behavior: Behavior normal. Behavior is cooperative.         Thought Content: Thought content normal.         Judgment: Judgment normal.          The following data was reviewed by: Rigoberto Lowe III, NP-C on 02/01/2024:  Common labs          5/12/2023    04:46 9/1/2023    08:18 1/29/2024    08:13 1/29/2024    08:17   Common Labs   Glucose 127  158  132     BUN 18  18  19     Creatinine 0.84  0.86  0.96     Sodium 142  142  140     Potassium 4.1  4.2  4.0     Chloride 108  105  106     Calcium 8.8  9.0  9.4     Total Protein  6.5      Albumin  4.2  4.4     Total Bilirubin  0.5  0.3     Alkaline Phosphatase  95  80     AST (SGOT)  12  11     ALT (SGPT)  15  19     WBC 7.67   7.64     Hemoglobin 13.3   14.1     Hematocrit 39.5   41.9     Platelets 180   217     Total Cholesterol 122   146     Total Cholesterol  138      Triglycerides 216  228  179     HDL Cholesterol 36  37  37     LDL Cholesterol  51  64  78     Hemoglobin A1C 7.50  7.40   8.10     Microalbumin, Urine    <1.2    PSA   3.220                  Assessment and Plan     Problem List Items Addressed This Visit          Cardiac and Vasculature    PVD (peripheral vascular disease) (Chronic)    Overview     2021: left lower extremity          CAD (coronary artery disease) (Chronic)    Overview     Due to the presence of severe small vessel disease and multiple stents: Cardiology recommended stay on DAPT as long as he tolerates.    5/11/23 which revealed severe multivessel disease and after discussion, he decided against CABG so PCI to LAD and D1 was performed.            Relevant Orders    Comprehensive Metabolic Panel    Lipid Panel With LDL / HDL Ratio    CBC (No Diff)    Essential hypertension (Chronic)    Current Assessment & Plan     Hypertension is improving with treatment.  Continue current treatment regimen.  Dietary sodium restriction.  Regular aerobic exercise.  Continue current medications.  Blood pressure will be reassessed at the next regular appointment.         Hyperlipidemia LDL goal <70 (Chronic)    Current Assessment & Plan     Lipid abnormalities are improving with treatment.  Pharmacotherapy as ordered.  Lipids will be reassessed in 6 months.         Relevant Orders    Lipid Panel With LDL / HDL Ratio       Endocrine and Metabolic    Hypothyroidism (Chronic)    Current Assessment & Plan     Stable   Check lab for ongoing therapeutic drug monitoring           Relevant Orders    TSH Rfx On Abnormal To Free T4    Type 2 diabetes mellitus with hyperglycemia, without long-term current use of insulin (Chronic)    Current Assessment & Plan     Your last A1C (3 month average) =   Lab Results   Component Value Date    HGBA1C 8.10 (H) 01/29/2024      Your diabetes is Uncontrolled.    The American Diabetes Association recommends an A1C of less than 7%.  A1C Average Levels Blood Sugar:   6%  126 mg/dL  7%  154 mg/dL  8%  183 mg/dL  9%  212 mg/dL  10%  240 mg/dL  11%  269 mg/dL  12%  298  mg/dL    Glucose goals for many adults with diabetes  Blood sugar before meals  mg/dL  Blood sugar 1-2 hours after the start of a meal Less than 180 mg/dL A1C Less than 7%    Eye Health:   You need a diabetic eye exam yearly.   Please have a copy of the note faxed to my office.   Fax: [phone]    Foot Health:   You need a diabetic foot exam yearly.   Check your feet routinely for any wounds.   You should always check your shoes for any debris that could cause a wound.          We had a long discussion that his diabetes is not controlled and will not be controlled with current diet.   States would need more help for diet changes and is open to ozempic.    He has a new insurance and he is not aware of coverage.   He is to stop januvia. (If ozempic is not covered - he can resume januvia).       We have discussed Ozempic injection to help improve your diabetes.     Ozempic is injected once weekly.  It is a multi-dose in with a fine needle that should be changed with each administration.   We have discussed and you did not report a family or personal history of thyroid c-cell tumors. If you develop a mass in the neck, difficulty swallowing or persistent hoarseness, you should report this to me immediately.     As we discussed, you will start with 0.25 mg weekly and every 4 weeks, if tolerating, you will notify me and we can adjust the dose up to get to target glucose control.     Side effects can by nausea, vomiting, diarrhea, constipation.  Reflux symptoms can worsen.  Work on eating smaller portions. Increase water intake.     *There is a risk of developing acute pancreatitis with this drug. Symptoms of acute pancreatitis are severe abdominal pain, nausea, and/or vomiting.   If you experience any of these, go to the ER and stop the medication.     Medication is not a substitute for healthily eating and exercise.     Routine exercise: 3-4 times a week of at least 30 mins.       Constipation: if this develops,  you can take over-the-counter colace as needed.     Nausea: over-the-counter ginger chews has been effective.      Notify office of any side effects or questions.     If you plan to have surgery: you should not take a dose for 10 days prior to surgery.     We do have issues of medication shortage and approval with insurance.   We have discussed due to demand and shortage, it may be possible if you start the medication, there is a possibility that getting a refill may be difficult.     Additional Information can be found at [email or web address].ozempic.com/             Relevant Medications    Semaglutide,0.25 or 0.5MG/DOS, (OZEMPIC) 2 MG/3ML solution pen-injector    Other Relevant Orders    Hemoglobin A1c       Genitourinary and Reproductive     BPH associated with nocturia (Chronic)       Mental Health    Anxiety (Chronic)    Current Assessment & Plan     Rarely takes xanax: refilled today  Effective when anxiety is out of control.          Relevant Medications    ALPRAZolam (XANAX) 0.5 MG tablet       Neuro    PN (peripheral neuropathy) (Chronic)    Overview     1/19/21: Started gabapentin          Current Assessment & Plan     Continue gabapentin             Tobacco    Nicotine dependence in remission    Relevant Orders    CT Chest Low Dose Wo     Other Visit Diagnoses       Medicare annual wellness visit, subsequent    -  Primary                    Follow Up     Return in about 6 months (around 8/1/2024).    Patient was given instructions and counseling regarding his condition or for health maintenance advice. Please see specific information pulled into the AVS if appropriate.

## 2024-02-01 NOTE — ASSESSMENT & PLAN NOTE
Your last A1C (3 month average) =   Lab Results   Component Value Date    HGBA1C 8.10 (H) 01/29/2024      Your diabetes is Uncontrolled.    The American Diabetes Association recommends an A1C of less than 7%.  A1C Average Levels Blood Sugar:   6%  126 mg/dL  7%  154 mg/dL  8%  183 mg/dL  9%  212 mg/dL  10%  240 mg/dL  11%  269 mg/dL  12%  298 mg/dL    Glucose goals for many adults with diabetes  Blood sugar before meals  mg/dL  Blood sugar 1-2 hours after the start of a meal Less than 180 mg/dL A1C Less than 7%    Eye Health:   You need a diabetic eye exam yearly.   Please have a copy of the note faxed to my office.   Fax: [phone]    Foot Health:   You need a diabetic foot exam yearly.   Check your feet routinely for any wounds.   You should always check your shoes for any debris that could cause a wound.          We had a long discussion that his diabetes is not controlled and will not be controlled with current diet.   States would need more help for diet changes and is open to ozempic.    He has a new insurance and he is not aware of coverage.   He is to stop januvia. (If ozempic is not covered - he can resume januvia).       We have discussed Ozempic injection to help improve your diabetes.     Ozempic is injected once weekly.  It is a multi-dose in with a fine needle that should be changed with each administration.   We have discussed and you did not report a family or personal history of thyroid c-cell tumors. If you develop a mass in the neck, difficulty swallowing or persistent hoarseness, you should report this to me immediately.     As we discussed, you will start with 0.25 mg weekly and every 4 weeks, if tolerating, you will notify me and we can adjust the dose up to get to target glucose control.     Side effects can by nausea, vomiting, diarrhea, constipation.  Reflux symptoms can worsen.  Work on eating smaller portions. Increase water intake.     *There is a risk of developing acute  pancreatitis with this drug. Symptoms of acute pancreatitis are severe abdominal pain, nausea, and/or vomiting.   If you experience any of these, go to the ER and stop the medication.     Medication is not a substitute for healthily eating and exercise.     Routine exercise: 3-4 times a week of at least 30 mins.       Constipation: if this develops, you can take over-the-counter colace as needed.     Nausea: over-the-counter ginger chews has been effective.      Notify office of any side effects or questions.     If you plan to have surgery: you should not take a dose for 10 days prior to surgery.     We do have issues of medication shortage and approval with insurance.   We have discussed due to demand and shortage, it may be possible if you start the medication, there is a possibility that getting a refill may be difficult.     Additional Information can be found at [email or web address]

## 2024-02-08 ENCOUNTER — TELEPHONE (OUTPATIENT)
Dept: INTERNAL MEDICINE | Facility: CLINIC | Age: 74
End: 2024-02-08
Payer: MEDICARE

## 2024-02-08 NOTE — TELEPHONE ENCOUNTER
Provider: ELTON SCHOFIELD    Caller: DORETHA WITH [employer]UE [employer]OSS    Relationship to Patient: INSURANCE    Pharmacy: [facility], [facility]. - [address] [phone] [facility] [phone]      Phone Number: [phone]     Reason for Call: CALLING TO ADVISE THAT THIS PRESCRIPTION OF   Semaglutide,0.25 or 0.5MG/DOS, (OZEMPIC) 2 MG/3ML solution pen-injector IS NEEDING A PRIOR AUTHORIZATION.    PLEASE CALL [phone] OR [employer] TO START THE AUTHORIZATION PROCESS.

## 2024-02-12 ENCOUNTER — TELEPHONE (OUTPATIENT)
Dept: INTERNAL MEDICINE | Facility: CLINIC | Age: 74
End: 2024-02-12
Payer: MEDICARE

## 2024-02-22 ENCOUNTER — PRIOR AUTHORIZATION (OUTPATIENT)
Dept: INTERNAL MEDICINE | Facility: CLINIC | Age: 74
End: 2024-02-22
Payer: MEDICARE

## 2024-03-13 ENCOUNTER — NURSE TRIAGE (OUTPATIENT)
Dept: CALL CENTER | Facility: HOSPITAL | Age: 74
End: 2024-03-13
Payer: MEDICARE

## 2024-03-13 ENCOUNTER — TELEPHONE (OUTPATIENT)
Dept: INTERNAL MEDICINE | Facility: CLINIC | Age: 74
End: 2024-03-13
Payer: MEDICARE

## 2024-03-13 NOTE — TELEPHONE ENCOUNTER
Transfer from [facility].  After speaking with patient about blood sugars, connected with office to make an appointment.    Reason for Disposition   [1] Caller has NON-URGENT medication or insulin pump question AND [2] triager unable to answer question    Additional Information   Negative: Unconscious or difficult to awaken   Negative: Acting confused (e.g., disoriented, slurred speech)   Negative: Very weak (e.g., can't stand)   Negative: Sounds like a life-threatening emergency to the triager   Negative: [1] Vomiting AND [2] signs of dehydration (e.g., very dry mouth, lightheaded, dark urine)   Negative: [1] Blood glucose > 240 mg/dL (13.3 mmol/L) AND [2] rapid breathing   Negative: Blood glucose > 500 mg/dL (27.8 mmol/L)   Negative: [1] Blood glucose > 240 mg/dL (13.3 mmol/L) AND [2] urine ketones moderate-large (or more than 1+)   Negative: [1] Blood glucose > 240 mg/dL (13.3 mmol/L) AND [2] blood ketones > 1.4 mmol/L   Negative: [1] Blood glucose > 240 mg/dL (13.3 mmol/L) AND [2] vomiting AND [3] unable to check for ketones (in blood or urine)   Negative: [1] New-onset diabetes suspected (e.g., frequent urination, weak, weight loss) AND [2] vomiting or rapid breathing   Negative: Vomiting lasts > 4 hours   Negative: Patient sounds very sick or weak to the triager   Negative: Fever > 100.4 F (38.0 C)   Negative: Blood glucose > 400 mg/dL (22.2 mmol/L)   Negative: [1] Blood glucose > 300 mg/dL (16.7 mmol/L) AND [2] two or more times in a row   Negative: Urine ketones moderate - large (or blood ketones > 1.4 mmol/L)   Negative: [1] Symptoms of high blood sugar (e.g., abnormally thirsty, frequent urination, weight loss) AND [2] not able to test blood glucose AND [3] pregnant   Negative: [1] Caller has URGENT medication or insulin pump question AND [2] triager unable to answer question   Negative: [1] Blood glucose > 240 mg/dL (13.3 mmol/L) AND [2] pregnant   Negative: [1] Symptoms of high blood sugar (e.g., abnormally  "thirsty, frequent urination, weight loss) AND [2] not able to test blood glucose   Negative: New-onset diabetes suspected (e.g., abnormally thirsty, frequent urination, weight loss)    Answer Assessment - Initial Assessment Questions  1. BLOOD GLUCOSE: \"What is your blood glucose level?\"       Today 182  2. ONSET: \"When did you check the blood glucose?\"      Before breakfast, as soon as getting out of bed  3. USUAL RANGE: \"What is your glucose level usually?\" (e.g., usual fasting morning value, usual evening value)      Normal range is 135-150  4. KETONES: \"Do you check for ketones (urine or blood test strips)?\" If Yes, ask: \"What does the test show now?\"       No.  5. TYPE 1 or 2:  \"Do you know what type of diabetes you have?\"  (e.g., Type 1, Type 2, Gestational; doesn't know)       Type II  6. INSULIN: \"Do you take insulin?\" \"What type of insulin(s) do you use? What is the mode of delivery? (syringe, pen; injection or pump)?\"       No.  7. DIABETES PILLS: \"Do you take any pills for your diabetes?\" If Yes, ask: \"Have you missed taking any pills recently?\"      Metformin, started on ozempic.  8. OTHER SYMPTOMS: \"Do you have any symptoms?\" (e.g., fever, frequent urination, difficulty breathing, dizziness, weakness, vomiting)     No.  9. PREGNANCY: \"Is there any chance you are pregnant?\" \"When was your last menstrual period?\"     No.    Protocols used: Diabetes - High Blood Sugar-ADULT-AH    "

## 2024-03-13 NOTE — TELEPHONE ENCOUNTER
S/w patient about his elevated glucose readings. Pt stated he took a dose of his janvia on Monday and then Tuesday took his ozempic shot and this morning blood sugar was 182. Patient is in [place]    Advised to watch blood sugar over weekend if it gets higher let us know and will make appointment in office next week, advised if blood sugar is high to go to er or uc.

## 2024-03-13 NOTE — TELEPHONE ENCOUNTER
Patient is calling, His glucose has continually elevated since he has started Ozempic.    He has restarted Januvia on Monday on his own    Fasting in the mornings are 180s    Please advise

## 2024-03-14 RX ORDER — CLOPIDOGREL BISULFATE 75 MG/1
75 TABLET ORAL DAILY
Qty: 5 TABLET | Refills: 0 | Status: SHIPPED | OUTPATIENT
Start: 2024-03-14

## 2024-03-14 NOTE — TELEPHONE ENCOUNTER
Caller: Jesus Cuba    Relationship: Self    Best call back number: [phone]    Requested Prescriptions:   Requested Prescriptions     Pending Prescriptions Disp Refills    clopidogrel (PLAVIX) 75 MG tablet 90 tablet 0     Sig: Take 1 tablet by mouth Daily.        Pharmacy where request should be sent: [id number] [address] AT [facility] OF [facility] [address] - [phone]  - [phone] FX     Last office visit with prescribing clinician: 2/1/2024   Last telemedicine visit with prescribing clinician: Visit date not found   Next office visit with prescribing clinician: 8/5/2024     Additional details provided by patient: PATIENT STATES HE FORGOT THIS MEDICATION AT HOME, HE IS CURRENTLY IN [place], [place].    PATIENT STATES HE IS REQUESTING A 5 DAY SUPPLY OF THIS MEDICATION TO LAST HIM UNTIL HE GETS BACK HOME.     Does the patient have less than a 3 day supply:  [x] Yes  [] No    Would you like a call back once the refill request has been completed: [x] Yes [] No    If the office needs to give you a call back, can they leave a voicemail: [x] Yes [] No    Tamika Prabhakar, PCT   03/14/24 08:59 EDT

## 2024-03-20 ENCOUNTER — OFFICE VISIT (OUTPATIENT)
Dept: CARDIOLOGY | Facility: CLINIC | Age: 74
End: 2024-03-20
Payer: MEDICARE

## 2024-03-20 VITALS
HEART RATE: 69 BPM | OXYGEN SATURATION: 98 % | SYSTOLIC BLOOD PRESSURE: 138 MMHG | WEIGHT: 163 LBS | HEIGHT: 65 IN | BODY MASS INDEX: 27.16 KG/M2 | DIASTOLIC BLOOD PRESSURE: 80 MMHG

## 2024-03-20 DIAGNOSIS — I25.10 CORONARY ARTERY DISEASE INVOLVING NATIVE CORONARY ARTERY OF NATIVE HEART WITHOUT ANGINA PECTORIS: Chronic | ICD-10-CM

## 2024-03-20 DIAGNOSIS — Z95.5 S/P RIGHT CORONARY ARTERY (RCA) STENT PLACEMENT: Primary | ICD-10-CM

## 2024-03-20 DIAGNOSIS — I10 ESSENTIAL HYPERTENSION: Chronic | ICD-10-CM

## 2024-03-20 NOTE — PROGRESS NOTES
Subjective:     Encounter Date:03/20/2024      Patient ID: Jesus Cuba is a 73 y.o. male.    Chief Complaint:  Follow up CAD    HPI:   73 y.o. male  with CAD status post PCI to LAD, D1, ramus, and prox RCA  in May 2023(see below,) hypertension, hyperlipidemia, diabetes who presents for follow up.  I last saw him in May 2023 and he was feeling well at that time.  He subsequently followed up with Ale Espinoza in September and was feeling great.  He presents today for follow-up and has no complaints.  He walks about 40 minutes, 5 days a week and is asymptomatic during these activities.  He has had no issues with bleeding, dyspnea exertion, chest pain.      Per prior note:  Patient presented with progressive dyspnea on exertion and elevated troponins on 5/11/23. He underwent LHC on 5/11/23 which revealed severe multivessel disease and after discussion, he decided against CABG so PCI to LAD and D1 was performed.  He was also found to have severe ramus disease and a proximal RCA  so he underwent staged PCI on 5/15/2023 with 2.5 x 15mm Xience Skypoint drug-eluting stent (postdilated with 2.75 mm NC) to severe proximal ramus stenosis and intravascular ultrasound guided PCI with a 2.5 x 38 mm and 3.5 x 33 mm Xience Skypoint drug-eluting stents (postdilated proximally with a 4.5 mm NC and distally with a 3.5 mm NC) to proximal RCA .  Since his PCI, he has felt great, he is been walking frequently and has no symptoms.  With respect to his medication regimen, he is unsure if he is on Imdur as he does not remember that medication.      The following portions of the patient's history were reviewed and updated as appropriate: allergies, current medications, past family history, past medical history, past social history, past surgical history and problem list.     REVIEW OF SYSTEMS:   All systems reviewed.  Pertinent positives identified in HPI.  All other systems are negative.    Past Medical History:   Diagnosis  Date    CAD (coronary artery disease)     Diabetes mellitus     Dyslipidemia     Hyperlipidemia     Hypertension     Hypothyroidism     Myocardial infarction     NSTEMI       Family History   Problem Relation Age of Onset    Cancer Mother         skin    COPD Mother     Hyperlipidemia Mother     Hypertension Mother     Cancer Father         lung    Diabetes Father     Heart disease Father     Hyperlipidemia Father     Hypertension Father     Early death Brother         50    Alcohol abuse Brother     Colon cancer Neg Hx     Colon polyps Neg Hx        Social History     Socioeconomic History    Marital status: Single   Tobacco Use    Smoking status: Never    Smokeless tobacco: Never   Vaping Use    Vaping status: Never Used   Substance and Sexual Activity    Alcohol use: Yes     Alcohol/week: 2.0 standard drinks of alcohol     Types: 2 Shots of liquor per week     Comment: 2 drinks per week    Drug use: Never    Sexual activity: Yes     Partners: Female       No Known Allergies    Past Surgical History:   Procedure Laterality Date    CARDIAC CATHETERIZATION  07/03/2018    CARDIAC CATHETERIZATION N/A 05/11/2023    Procedure: Left Heart Cath;  Surgeon: Petr Asher MD;  [facility];  Service: Cardiovascular;  Laterality: N/A;    CARDIAC CATHETERIZATION N/A 05/11/2023    Procedure: Coronary angiography;  Surgeon: Petr Asher MD;  [facility];  Service: Cardiovascular;  Laterality: N/A;    CARDIAC CATHETERIZATION N/A 05/11/2023    Procedure: Percutaneous Coronary Intervention;  Surgeon: Petr Asher MD;  [facility];  Service: Cardiovascular;  Laterality: N/A;    CARDIAC CATHETERIZATION N/A 05/11/2023    Procedure: Stent COREY coronary;  Surgeon: Petr Asher MD;  [facility];  Service: Cardiovascular;  Laterality: N/A;    CARDIAC CATHETERIZATION N/A 5/15/2023    Procedure: Percutaneous Coronary Intervention;   Surgeon: Petr Asher MD;  [facility];  Service: Cardiology;  Laterality: N/A;    CARDIAC CATHETERIZATION N/A 5/15/2023    Procedure: Stent COREY coronary;  Surgeon: Petr Asher MD;  [facility];  Service: Cardiology;  Laterality: N/A;    CARDIAC CATHETERIZATION N/A 5/15/2023    Procedure: Chronic Total Occlussion;  Surgeon: Petr Asher MD;  [facility];  Service: Cardiology;  Laterality: N/A;    CORONARY ANGIOPLASTY WITH STENT PLACEMENT      4 stents  over several years - first in 2002    INTERVENTIONAL RADIOLOGY PROCEDURE N/A 05/11/2023    Procedure: Intravascular Ultrasound;  Surgeon: Petr Asher MD;  [facility];  Service: Cardiovascular;  Laterality: N/A;    INTERVENTIONAL RADIOLOGY PROCEDURE N/A 5/15/2023    Procedure: Intravascular Ultrasound;  Surgeon: Petr Asher MD;  [facility];  Service: Cardiology;  Laterality: N/A;    SKIN BIOPSY      shoulder left       Procedures       Objective:         Vitals:    03/20/24 1041   BP: 138/80   Pulse: 69   SpO2: 98%       PHYSICAL EXAM:  GEN: well appearing, in NAD   HEENT: NCAT, EOMI, moist mucus membranes   Respiratory: CTAB, no wheezes, rales or rhonchi  CV: normal rate, regular rhythm, normal S1, S2, no murmurs, rubs, gallops, +2 radial pulses b/l  GI: soft, nontender, nondistended  MSK: no edema  Skin: no rash, warm, dry  Heme/Lymph: no bruising or bleeding  Neuro: Alert and Oriented x 3, grossly normal motor function        Assessment:         (Z95.5) S/P right coronary artery (RCA) stent placement    (I10) Essential hypertension    (I25.10) Coronary artery disease involving native coronary artery of native heart without angina pectoris    73 y.o. male with CAD status post prior PCI with stenting to mid LAD, first diagonal, PDA, hypertension, hyperlipidemia, diabetes and recent multiple PCI's, see below, in the setting of  NSTEMI who presents for follow-up.        Plan:       #CAD status post prior PCI  Patient presented with NSTEMI in May 2023 and received PCI with drug-eluting stent to LAD and balloon angioplasty to D1. He was also found to have severe ramus disease and a proximal RCA  so he underwent staged PCI on 5/15/2023 with 2.5 x 15mm Xience Skypoint drug-eluting stent (postdilated with 2.75 mm NC) to severe proximal ramus stenosis and intravascular ultrasound guided PCI with a 2.5 x 38 mm and 3.5 x 33 mm Xience Skypoint drug-eluting stents (postdilated proximally with a 4.5 mm NC and distally with a 3.5 mm NC) to proximal RCA .  - continue aspirin 81mg daily, plavix 75mg daily, will continue indefinitely given multiple layers of stents  - continue atorvastatin 40mg daily  - continue toprol 12.5mg daily    #HTN  Repeat readings slightly elevated today with systolics in the 150s. This is not normal for him.  For now we will continue Toprol and number Renetta and have him follow-up in 3 months for recheck.    #Hyperlipidemia  Recently did panel with LDL in the 70s.  Goal is less than 55.  He has previously been intolerant to high-dose statin.  -He will double up on his atorvastatin to 80 mg for the next week or so to see if he can tolerate it, he would then contact us back and we will either send a new prescription of 80 mg atorvastatin, or if he has symptoms, decreased to 40 mg and adds that he    Dr Lowe,  thank you very much for referring this kind patient to me. Please call me with any questions or concerns. I will see the patient again in the office in 3 months or earlier as needed.         Petr Asher MD  03/20/24  [facility]    Outpatient Encounter Medications as of 3/20/2024   Medication Sig Dispense Refill    aspirin 81 MG EC tablet Take 1 tablet by mouth Daily. 90 tablet 3    atorvastatin (LIPITOR) 40 MG tablet Take 1 tablet by mouth Every Night. 90 tablet 1    Canagliflozin (Invokana) 300 MG  tablet tablet Take 1 tablet by mouth Daily. 90 tablet 0    clopidogrel (PLAVIX) 75 MG tablet Take 1 tablet by mouth Daily. 5 tablet 0    gabapentin (NEURONTIN) 300 MG capsule One capsule three times a day as needed for pain. 270 capsule 0    irbesartan (AVAPRO) 300 MG tablet Take 1 tablet by mouth Daily. 90 tablet 1    levothyroxine (SYNTHROID, LEVOTHROID) 75 MCG tablet Take 1 tablet by mouth Daily. 90 tablet 1    metFORMIN (GLUCOPHAGE) 1000 MG tablet Take 1 tablet by mouth 2 (Two) Times a Day With Meals. 180 tablet 1    metoprolol succinate XL (TOPROL-XL) 25 MG 24 hr tablet TAKE 1/2 TABLET EVERY DAY 45 tablet 3    NITROSTAT 0.4 MG SL tablet DISSOLVE ONE TABLET UNDER THE TONGUE AS NEEDED (Patient taking differently: Place 1 tablet under the tongue Every 5 (Five) Minutes As Needed.) 25 tablet 0    Semaglutide,0.25 or 0.5MG/DOS, (OZEMPIC) 2 MG/3ML solution pen-injector Inject 0.25 mg under the skin into the appropriate area as directed 1 (One) Time Per Week. 3 mL 1    ALPRAZolam (XANAX) 0.5 MG tablet Take 1 tablet by mouth 2 (Two) Times a Day As Needed for Anxiety. Take sparingly. (Patient not taking: Reported on 3/20/2024) 60 tablet 0     No facility-administered encounter medications on file as of 3/20/2024.

## 2024-03-21 ENCOUNTER — TELEPHONE (OUTPATIENT)
Dept: INTERNAL MEDICINE | Facility: CLINIC | Age: 74
End: 2024-03-21
Payer: MEDICARE

## 2024-03-21 DIAGNOSIS — E11.65 TYPE 2 DIABETES MELLITUS WITH HYPERGLYCEMIA, WITHOUT LONG-TERM CURRENT USE OF INSULIN: Primary | ICD-10-CM

## 2024-03-21 DIAGNOSIS — F41.9 ANXIETY: Chronic | ICD-10-CM

## 2024-03-21 DIAGNOSIS — G58.9 MONONEUROPATHY: Chronic | ICD-10-CM

## 2024-03-21 NOTE — TELEPHONE ENCOUNTER
Caller: Jesus Cuba    Relationship: Self    Best call back number: [phone]    What medication are you requesting: Semaglutide,     Have you had these symptoms before:    [x] Yes  [] No    Have you been treated for these symptoms before:   [x] Yes  [] No    If a prescription is needed, what is your preferred pharmacy and phone number: [facility], [facility]. [address] [phone] [facility] [phone] FX     Additional notes:  PATIENT IS QUESTIONING IF HE WILL BE GETTING THE MEDICATION INCREASED OR STAYING ON THE SAME DOSE.    PATIENT IS READY FOR THE NEXT SCRIPT.    PATIENT ALSO HAS QUESTIONS ABOUT HIS BLOOD PRESSURE AS THE CARDIO DR SEEMED TO BE CONCERNED 155/72.

## 2024-03-21 NOTE — TELEPHONE ENCOUNTER
Patient is wanting to go up on his ozempic to 0.5 mg     Please advise     Would also like gabapentin refilled and his xanax.

## 2024-03-22 RX ORDER — GABAPENTIN 300 MG/1
CAPSULE ORAL
Qty: 270 CAPSULE | Refills: 1 | Status: SHIPPED | OUTPATIENT
Start: 2024-03-22

## 2024-03-22 RX ORDER — ALPRAZOLAM 0.5 MG/1
0.5 TABLET ORAL 2 TIMES DAILY PRN
Qty: 60 TABLET | Refills: 0 | Status: SHIPPED | OUTPATIENT
Start: 2024-03-22

## 2024-03-26 ENCOUNTER — TELEPHONE (OUTPATIENT)
Dept: INTERNAL MEDICINE | Facility: CLINIC | Age: 74
End: 2024-03-26
Payer: MEDICARE

## 2024-03-26 NOTE — TELEPHONE ENCOUNTER
Please call the BCR team to do a prior autho, reference [id number].  There is a 71 Hour time frame left

## 2024-04-04 DIAGNOSIS — G58.9 MONONEUROPATHY: Chronic | ICD-10-CM

## 2024-04-05 RX ORDER — GABAPENTIN 300 MG/1
CAPSULE ORAL
Qty: 270 CAPSULE | Refills: 1 | OUTPATIENT
Start: 2024-04-05

## 2024-04-12 ENCOUNTER — TELEPHONE (OUTPATIENT)
Dept: INTERNAL MEDICINE | Facility: CLINIC | Age: 74
End: 2024-04-12
Payer: MEDICARE

## 2024-04-15 DIAGNOSIS — I10 ESSENTIAL HYPERTENSION: Chronic | ICD-10-CM

## 2024-04-15 DIAGNOSIS — G58.9 MONONEUROPATHY: Chronic | ICD-10-CM

## 2024-04-15 RX ORDER — METOPROLOL SUCCINATE 25 MG/1
12.5 TABLET, EXTENDED RELEASE ORAL DAILY
Qty: 45 TABLET | Refills: 3 | Status: SHIPPED | OUTPATIENT
Start: 2024-04-15

## 2024-04-15 RX ORDER — METOPROLOL SUCCINATE 25 MG/1
12.5 TABLET, EXTENDED RELEASE ORAL DAILY
Qty: 90 TABLET | Refills: 0 | OUTPATIENT
Start: 2024-04-15

## 2024-04-15 RX ORDER — GABAPENTIN 300 MG/1
CAPSULE ORAL
Qty: 270 CAPSULE | Refills: 1 | Status: SHIPPED | OUTPATIENT
Start: 2024-04-15 | End: 2024-04-16 | Stop reason: SDUPTHER

## 2024-04-15 NOTE — TELEPHONE ENCOUNTER
Rx Refill Note  Requested Prescriptions     Pending Prescriptions Disp Refills    metoprolol succinate XL (TOPROL-XL) 25 MG 24 hr tablet [Pharmacy Med Name: Metoprolol Succinate ER Oral Tablet Extended Release 24 Hour 25 MG] 90 tablet 0     Sig: TAKE 1/2 TABLET BY MOUTH ONE TIME A DAY    gabapentin (NEURONTIN) 300 MG capsule 270 capsule 1     Sig: One capsule three times a day as needed for pain.      Last office visit with prescribing clinician: 2/1/2024  Next office visit with prescribing clinician: 8/23/2024         Tamika Medina MA  04/15/24, 09:11 EDT

## 2024-04-16 DIAGNOSIS — E11.9 DIABETES MELLITUS WITHOUT COMPLICATION: ICD-10-CM

## 2024-04-16 DIAGNOSIS — G58.9 MONONEUROPATHY: Chronic | ICD-10-CM

## 2024-04-16 DIAGNOSIS — F41.9 ANXIETY: Chronic | ICD-10-CM

## 2024-04-17 ENCOUNTER — TRANSCRIBE ORDERS (OUTPATIENT)
Dept: ADMINISTRATIVE | Facility: HOSPITAL | Age: 74
End: 2024-04-17
Payer: MEDICARE

## 2024-04-17 DIAGNOSIS — Z13.9 SCREENING DUE: Primary | ICD-10-CM

## 2024-04-19 RX ORDER — GABAPENTIN 300 MG/1
CAPSULE ORAL
Qty: 270 CAPSULE | Refills: 1 | Status: SHIPPED | OUTPATIENT
Start: 2024-04-19

## 2024-04-19 RX ORDER — ATORVASTATIN CALCIUM 40 MG/1
40 TABLET, FILM COATED ORAL NIGHTLY
Qty: 90 TABLET | Refills: 1 | Status: SHIPPED | OUTPATIENT
Start: 2024-04-19

## 2024-04-19 RX ORDER — CLOPIDOGREL BISULFATE 75 MG/1
75 TABLET ORAL DAILY
Qty: 5 TABLET | Refills: 0 | Status: SHIPPED | OUTPATIENT
Start: 2024-04-19

## 2024-04-19 RX ORDER — IRBESARTAN 300 MG/1
300 TABLET ORAL DAILY
Qty: 90 TABLET | Refills: 1 | Status: SHIPPED | OUTPATIENT
Start: 2024-04-19

## 2024-04-19 RX ORDER — ALPRAZOLAM 0.5 MG/1
0.5 TABLET ORAL 2 TIMES DAILY PRN
Qty: 60 TABLET | Refills: 3 | OUTPATIENT
Start: 2024-04-19

## 2024-04-19 NOTE — TELEPHONE ENCOUNTER
Rx Refill Note  Requested Prescriptions     Pending Prescriptions Disp Refills    atorvastatin (LIPITOR) 40 MG tablet 90 tablet 1     Sig: Take 1 tablet by mouth Every Night.    metFORMIN (GLUCOPHAGE) 1000 MG tablet 180 tablet 1     Sig: Take 1 tablet by mouth 2 (Two) Times a Day With Meals.    irbesartan (AVAPRO) 300 MG tablet 90 tablet 1     Sig: Take 1 tablet by mouth Daily.    clopidogrel (PLAVIX) 75 MG tablet 5 tablet 0     Sig: Take 1 tablet by mouth Daily.    ALPRAZolam (XANAX) 0.5 MG tablet 60 tablet 0     Sig: Take 1 tablet by mouth 2 (Two) Times a Day As Needed for Anxiety. Take sparingly.    gabapentin (NEURONTIN) 300 MG capsule 270 capsule 1     Sig: One capsule three times a day as needed for pain.      Last office visit with prescribing clinician: 2/1/2024   Last telemedicine visit with prescribing clinician: Visit date not found   Next office visit with prescribing clinician: 8/23/2024

## 2024-05-22 RX ORDER — CANAGLIFLOZIN 300 MG/1
1 TABLET, FILM COATED ORAL DAILY
Qty: 90 TABLET | Refills: 1 | Status: SHIPPED | OUTPATIENT
Start: 2024-05-22 | End: 2024-05-22 | Stop reason: SDUPTHER

## 2024-05-22 RX ORDER — CANAGLIFLOZIN 300 MG/1
1 TABLET, FILM COATED ORAL DAILY
Qty: 90 TABLET | Refills: 1 | Status: SHIPPED | OUTPATIENT
Start: 2024-05-22

## 2024-05-22 NOTE — TELEPHONE ENCOUNTER
Patient called the office and was wanting us to print a prescription for Invokana and patient will  prescription. The pharmacy is [facility] in [facility].     Patient states that invokana was approved but for 90 day supply it would cost him $400 and with sending medication to [facility] would cost 300.     Please advise

## 2024-05-23 ENCOUNTER — TELEPHONE (OUTPATIENT)
Dept: INTERNAL MEDICINE | Facility: CLINIC | Age: 74
End: 2024-05-23
Payer: MEDICARE

## 2024-05-23 NOTE — TELEPHONE ENCOUNTER
Patient came by the office to  invokana prescription. Patient is going to fill this prescription and was wondering is there something cheaper that pt can take instead of paying $300.     Please advise

## 2024-06-02 ENCOUNTER — HOSPITAL ENCOUNTER (OUTPATIENT)
Facility: HOSPITAL | Age: 74
Discharge: HOME | End: 2024-06-02
Admitting: NURSE PRACTITIONER
Payer: MEDICARE

## 2024-06-02 DIAGNOSIS — F17.211 CIGARETTE NICOTINE DEPENDENCE IN REMISSION: ICD-10-CM

## 2024-06-02 PROCEDURE — 71271 CT THORAX LUNG CANCER SCR C-: CPT

## 2024-06-18 PROBLEM — E11.3293 NONPROLIFERATIVE DIABETIC RETINOPATHY OF BOTH EYES: Status: ACTIVE | Noted: 2024-06-18

## 2024-06-19 ENCOUNTER — OFFICE VISIT (OUTPATIENT)
Dept: CARDIOLOGY | Facility: CLINIC | Age: 74
End: 2024-06-19
Payer: MEDICARE

## 2024-06-19 VITALS
WEIGHT: 163 LBS | SYSTOLIC BLOOD PRESSURE: 130 MMHG | OXYGEN SATURATION: 96 % | BODY MASS INDEX: 27.16 KG/M2 | HEART RATE: 68 BPM | HEIGHT: 65 IN | DIASTOLIC BLOOD PRESSURE: 72 MMHG

## 2024-06-19 DIAGNOSIS — Z95.5 S/P RIGHT CORONARY ARTERY (RCA) STENT PLACEMENT: ICD-10-CM

## 2024-06-19 DIAGNOSIS — I10 ESSENTIAL HYPERTENSION: Chronic | ICD-10-CM

## 2024-06-19 DIAGNOSIS — I25.10 CORONARY ARTERY DISEASE INVOLVING NATIVE CORONARY ARTERY OF NATIVE HEART WITHOUT ANGINA PECTORIS: Primary | Chronic | ICD-10-CM

## 2024-06-19 DIAGNOSIS — I49.3 PVC (PREMATURE VENTRICULAR CONTRACTION): ICD-10-CM

## 2024-06-19 DIAGNOSIS — Z95.5 STATUS POST INSERTION OF DRUG-ELUTING STENT INTO LEFT ANTERIOR DESCENDING (LAD) ARTERY: ICD-10-CM

## 2024-06-19 PROCEDURE — 1159F MED LIST DOCD IN RCRD: CPT | Performed by: STUDENT IN AN ORGANIZED HEALTH CARE EDUCATION/TRAINING PROGRAM

## 2024-06-19 PROCEDURE — 1160F RVW MEDS BY RX/DR IN RCRD: CPT | Performed by: STUDENT IN AN ORGANIZED HEALTH CARE EDUCATION/TRAINING PROGRAM

## 2024-06-19 PROCEDURE — 99214 OFFICE O/P EST MOD 30 MIN: CPT | Performed by: STUDENT IN AN ORGANIZED HEALTH CARE EDUCATION/TRAINING PROGRAM

## 2024-06-19 PROCEDURE — 3078F DIAST BP <80 MM HG: CPT | Performed by: STUDENT IN AN ORGANIZED HEALTH CARE EDUCATION/TRAINING PROGRAM

## 2024-06-19 PROCEDURE — 3075F SYST BP GE 130 - 139MM HG: CPT | Performed by: STUDENT IN AN ORGANIZED HEALTH CARE EDUCATION/TRAINING PROGRAM

## 2024-06-19 NOTE — PROGRESS NOTES
"    Subjective:     Encounter Date:06/19/2024      Patient ID: Jesus Cuba is a 74 y.o. male.    Chief Complaint:  Follow up CAD    HPI:   74 y.o. male  with CAD status post PCI to LAD, D1, ramus, and prox RCA  in May 2023(see below,) hypertension, hyperlipidemia, diabetes who presents for follow up.  I last saw the patient in March 2024 and he was doing well.  His blood pressure was slightly elevated so we planned for follow-up today for reassessment. His BP has been very well controlled. His main complaint is intermittent \"missed beats.\" He has had this for a number of years. Holter monitor 10 years ago revealed PVCs. He states he hasn't had any while at rest in over a month. They occur mostly when he is working. They do not occur if he walks.    Per prior note:  Patient presented with progressive dyspnea on exertion and elevated troponins on 5/11/23. He underwent LHC on 5/11/23 which revealed severe multivessel disease and after discussion, he decided against CABG so PCI to LAD and D1 was performed.  He was also found to have severe ramus disease and a proximal RCA  so he underwent staged PCI on 5/15/2023 with 2.5 x 15mm Xience Skypoint drug-eluting stent (postdilated with 2.75 mm NC) to severe proximal ramus stenosis and intravascular ultrasound guided PCI with a 2.5 x 38 mm and 3.5 x 33 mm Xience Skypoint drug-eluting stents (postdilated proximally with a 4.5 mm NC and distally with a 3.5 mm NC) to proximal RCA .  Since his PCI, he has felt great, he is been walking frequently and has no symptoms.  With respect to his medication regimen, he is unsure if he is on Imdur as he does not remember that medication.      The following portions of the patient's history were reviewed and updated as appropriate: allergies, current medications, past family history, past medical history, past social history, past surgical history and problem list.     REVIEW OF SYSTEMS:   All systems reviewed.  Pertinent " positives identified in HPI.  All other systems are negative.    Past Medical History:   Diagnosis Date    CAD (coronary artery disease)     Diabetes mellitus     Dyslipidemia     Hyperlipidemia     Hypertension     Hypothyroidism     Myocardial infarction     NSTEMI       Family History   Problem Relation Age of Onset    Cancer Mother         skin    COPD Mother     Hyperlipidemia Mother     Hypertension Mother     Cancer Father         lung    Diabetes Father     Heart disease Father     Hyperlipidemia Father     Hypertension Father     Early death Brother         50    Alcohol abuse Brother     Colon cancer Neg Hx     Colon polyps Neg Hx        Social History     Socioeconomic History    Marital status: Single   Tobacco Use    Smoking status: Never    Smokeless tobacco: Never   Vaping Use    Vaping status: Never Used   Substance and Sexual Activity    Alcohol use: Yes     Alcohol/week: 2.0 standard drinks of alcohol     Types: 2 Shots of liquor per week     Comment: 2 drinks per week    Drug use: Never    Sexual activity: Yes     Partners: Female       No Known Allergies    Past Surgical History:   Procedure Laterality Date    CARDIAC CATHETERIZATION  07/03/2018    CARDIAC CATHETERIZATION N/A 05/11/2023    Procedure: Left Heart Cath;  Surgeon: Petr Asher MD;  Location: [facility] CATH INVASIVE LOCATION;  Service: Cardiovascular;  Laterality: N/A;    CARDIAC CATHETERIZATION N/A 05/11/2023    Procedure: Coronary angiography;  Surgeon: Petr Asher MD;  Location: [facility] CATH INVASIVE LOCATION;  Service: Cardiovascular;  Laterality: N/A;    CARDIAC CATHETERIZATION N/A 05/11/2023    Procedure: Percutaneous Coronary Intervention;  Surgeon: Petr Asher MD;  Location[facility] CATH INVASIVE LOC[facility];  Service: Cardiovascular;  Laterality: N/A;    CARDIAC CATHETERIZATION N/A 05/11/2023    Procedure: Stent COREY coronary;  Surgeon: Petr Asher MD;  Location: [facility] CATH INVASIVE LOCATION;  Service: Cardiovascular;  Laterality:  N/A;    CARDIAC CATHETERIZATION N/A 5/15/2023    Procedure: Percutaneous Coronary Intervention;  Surgeon: Petr Asher MD;  Location:  [facility] CATH INVASIVE LOCATION;  Service: Cardiology;  Laterality: N/A;    CARDIAC CATHETERIZATION N/A 5/15/2023    Procedure: Stent COREY coronary;  Surgeon: Petr Asher MD;  Location:  [facility] CATH INVASIVE LOCATION;  Service: Cardiology;  Laterality: N/A;    CARDIAC CATHETERIZATION N/A 5/15/2023    Procedure: Chronic Total Occlussion;  Surgeon: Petr Asher MD;  Location:  [facility] CATH INVASIVE LOCATION;  Service: Cardiology;  Laterality: N/A;    CORONARY ANGIOPLASTY WITH STENT PLACEMENT      4 stents  over several years - first in 2002    INTERVENTIONAL RADIOLOGY PROCEDURE N/A 05/11/2023    Procedure: Intravascular Ultrasound;  Surgeon: Petr Asher MD;  Location:  [facility] CATH INVASIVE LOCATION;  Service: Cardiovascular;  Laterality: N/A;    INTERVENTIONAL RADIOLOGY PROCEDURE N/A 5/15/2023    Procedure: Intravascular Ultrasound;  Surgeon: Petr Asher MD;  Location:  [facility] CATH INVASIVE LOCATION;  Service: Cardiology;  Laterality: N/A;    SKIN BIOPSY      shoulder left       Procedures       Objective:         Vitals:    06/19/24 1050   BP: 130/72   Pulse: 68   SpO2: 96%         PHYSICAL EXAM:  GEN: well appearing, in NAD   HEENT: NCAT, EOMI, moist mucus membranes   Respiratory: CTAB, no wheezes, rales or rhonchi  CV: normal rate, regular rhythm, normal S1, S2, no murmurs, rubs, gallops, +2 radial pulses b/l  GI: soft, nontender, nondistended  MSK: no edema  Skin: no rash, warm, dry  Heme/Lymph: no bruising or bleeding  Neuro: Alert and Oriented x 3, grossly normal motor function        Assessment:         (I25.10) Coronary artery disease involving native coronary artery of native heart without angina pectoris    (Z95.5) S/P right coronary artery (RCA) stent placement    (Z95.5) Status post insertion of drug-eluting stent into left anterior descending (LAD) artery    (I10) Essential  hypertension    (I49.3) PVC (premature ventricular contraction)    73 y.o. male with CAD status post prior PCI with stenting to mid LAD, first diagonal, PDA, hypertension, hyperlipidemia, diabetes and recent multiple PCI's, see below, in the setting of NSTEMI who presents for follow-up.         Plan:       #CAD status post prior PCI  Patient presented with NSTEMI in May 2023 and received PCI with drug-eluting stent to LAD and balloon angioplasty to D1. He was also found to have severe ramus disease and a proximal RCA  so he underwent staged PCI on 5/15/2023 with 2.5 x 15mm Xience Skypoint drug-eluting stent (postdilated with 2.75 mm NC) to severe proximal ramus stenosis and intravascular ultrasound guided PCI with a 2.5 x 38 mm and 3.5 x 33 mm Xience Skypoint drug-eluting stents (postdilated proximally with a 4.5 mm NC and distally with a 3.5 mm NC) to proximal RCA .  - continue aspirin 81mg daily, plavix 75mg daily, will continue indefinitely given multiple layers of stents  - continue atorvastatin 40mg daily  - continue toprol 12.5mg daily    #PVCs  Intermittently symptomatic.  If symptoms worsen will double metoprolol and consider Holter monitor to assess for any other arrhythmia.    #HTN  BP is well controlled.    Dr Lowe,  thank you very much for referring this kind patient to me. Please call me with any questions or concerns. I will see the patient again in the office in one year or earlier as needed.         Petr Asher MD  06/19/24  [facility]    Outpatient Encounter Medications as of 6/19/2024   Medication Sig Dispense Refill    aspirin 81 MG EC tablet Take 1 tablet by mouth Daily. 90 tablet 3    atorvastatin (LIPITOR) 40 MG tablet Take 1 tablet by mouth Every Night. 90 tablet 1    Canagliflozin (Invokana) 300 MG tablet tablet Take 1 tablet by mouth Daily. 90 tablet 1    clopidogrel (PLAVIX) 75 MG tablet Take 1 tablet by mouth Daily. 5 tablet 0    gabapentin (NEURONTIN) 300 MG  capsule One capsule three times a day as needed for pain. 270 capsule 1    irbesartan (AVAPRO) 300 MG tablet Take 1 tablet by mouth Daily. 90 tablet 1    levothyroxine (SYNTHROID, LEVOTHROID) 75 MCG tablet Take 1 tablet by mouth Daily. 90 tablet 1    metFORMIN (GLUCOPHAGE) 1000 MG tablet Take 1 tablet by mouth 2 (Two) Times a Day With Meals. 180 tablet 1    metoprolol succinate XL (TOPROL-XL) 25 MG 24 hr tablet Take 0.5 tablets by mouth Daily. 45 tablet 3    NITROSTAT 0.4 MG SL tablet DISSOLVE ONE TABLET UNDER THE TONGUE AS NEEDED (Patient taking differently: Place 1 tablet under the tongue Every 5 (Five) Minutes As Needed.) 25 tablet 0    SITagliptin (JANUVIA) 100 MG tablet Take 1 tablet by mouth Daily.      ALPRAZolam (XANAX) 0.5 MG tablet Take 1 tablet by mouth 2 (Two) Times a Day As Needed for Anxiety. Take sparingly. (Patient not taking: Reported on 6/19/2024) 60 tablet 0    [DISCONTINUED] Semaglutide,0.25 or 0.5MG/DOS, (OZEMPIC) 2 MG/3ML solution pen-injector Inject 0.5 mg under the skin into the appropriate area as directed 1 (One) Time Per Week. 3 mL 1     No facility-administered encounter medications on file as of 6/19/2024.

## 2024-07-02 RX ORDER — CLOPIDOGREL BISULFATE 75 MG/1
75 TABLET ORAL DAILY
Qty: 5 TABLET | Refills: 0 | Status: SHIPPED | OUTPATIENT
Start: 2024-07-02

## 2024-07-14 DIAGNOSIS — F41.9 ANXIETY: Chronic | ICD-10-CM

## 2024-07-14 DIAGNOSIS — E03.4 HYPOTHYROIDISM DUE TO ACQUIRED ATROPHY OF THYROID: ICD-10-CM

## 2024-07-14 DIAGNOSIS — G58.9 MONONEUROPATHY: Chronic | ICD-10-CM

## 2024-07-14 DIAGNOSIS — E11.9 DIABETES MELLITUS WITHOUT COMPLICATION: ICD-10-CM

## 2024-07-15 RX ORDER — CLOPIDOGREL BISULFATE 75 MG/1
75 TABLET ORAL DAILY
Qty: 90 TABLET | Refills: 0 | OUTPATIENT
Start: 2024-07-15

## 2024-07-15 RX ORDER — CLOPIDOGREL BISULFATE 75 MG/1
75 TABLET ORAL DAILY
Qty: 5 TABLET | Refills: 0 | OUTPATIENT
Start: 2024-07-15

## 2024-07-15 NOTE — TELEPHONE ENCOUNTER
Rx Refill Note  Requested Prescriptions     Pending Prescriptions Disp Refills    levothyroxine (SYNTHROID, LEVOTHROID) 75 MCG tablet 90 tablet 1     Sig: Take 1 tablet by mouth Daily.    ALPRAZolam (XANAX) 0.5 MG tablet 60 tablet 0     Sig: Take 1 tablet by mouth 2 (Two) Times a Day As Needed for Anxiety. Take sparingly.    atorvastatin (LIPITOR) 40 MG tablet 90 tablet 1     Sig: Take 1 tablet by mouth Every Night.    metFORMIN (GLUCOPHAGE) 1000 MG tablet 180 tablet 1     Sig: Take 1 tablet by mouth 2 (Two) Times a Day With Meals.    irbesartan (AVAPRO) 300 MG tablet 90 tablet 1     Sig: Take 1 tablet by mouth Daily.    gabapentin (NEURONTIN) 300 MG capsule 270 capsule 1     Sig: One capsule three times a day as needed for pain.    clopidogrel (PLAVIX) 75 MG tablet 5 tablet 0     Sig: Take 1 tablet by mouth Daily.      Last office visit with prescribing clinician: 2/1/2024  Next office visit with prescribing clinician: 8/23/2024         Tamika Medina MA  07/15/24, 08:37 EDT

## 2024-07-16 RX ORDER — LEVOTHYROXINE SODIUM 0.07 MG/1
75 TABLET ORAL DAILY
Qty: 90 TABLET | Refills: 1 | Status: SHIPPED | OUTPATIENT
Start: 2024-07-16

## 2024-07-16 RX ORDER — ALPRAZOLAM 0.5 MG/1
0.5 TABLET ORAL 2 TIMES DAILY PRN
Qty: 60 TABLET | Refills: 0 | OUTPATIENT
Start: 2024-07-16

## 2024-07-16 RX ORDER — IRBESARTAN 300 MG/1
300 TABLET ORAL DAILY
Qty: 90 TABLET | Refills: 1 | Status: SHIPPED | OUTPATIENT
Start: 2024-07-16

## 2024-07-16 RX ORDER — ATORVASTATIN CALCIUM 40 MG/1
40 TABLET, FILM COATED ORAL NIGHTLY
Qty: 90 TABLET | Refills: 1 | Status: SHIPPED | OUTPATIENT
Start: 2024-07-16

## 2024-07-16 RX ORDER — CLOPIDOGREL BISULFATE 75 MG/1
75 TABLET ORAL DAILY
Qty: 5 TABLET | Refills: 0 | OUTPATIENT
Start: 2024-07-16

## 2024-07-16 RX ORDER — GABAPENTIN 300 MG/1
CAPSULE ORAL
Qty: 270 CAPSULE | Refills: 1 | Status: SHIPPED | OUTPATIENT
Start: 2024-07-16

## 2024-07-24 RX ORDER — CLOPIDOGREL BISULFATE 75 MG/1
75 TABLET ORAL DAILY
Qty: 5 TABLET | Refills: 0 | OUTPATIENT
Start: 2024-07-24

## 2024-07-24 RX ORDER — CLOPIDOGREL BISULFATE 75 MG/1
75 TABLET ORAL DAILY
Refills: 0 | Status: CANCELLED | OUTPATIENT
Start: 2024-07-24

## 2024-07-24 RX ORDER — CLOPIDOGREL BISULFATE 75 MG/1
75 TABLET ORAL DAILY
Qty: 5 TABLET | Refills: 0 | Status: SHIPPED | OUTPATIENT
Start: 2024-07-24 | End: 2024-07-24 | Stop reason: SDUPTHER

## 2024-07-24 NOTE — ADDENDUM NOTE
Addended by: DANUTA MONTEZ on: 7/24/2024 10:19 AM     Modules accepted: Orders, Level of Service     Called x 2 - no answer. Left VM informing of scheduled appt time, date, and location w/ Adrianne.   Also sent appt letter in mail        ----- Message from Adrianne Ferreira PA-C sent at 2/27/2024  9:19 AM CST -----  This pt needs f ollow up for skull fracture in 4-6 week - thanks!      Paresh

## 2024-07-25 RX ORDER — CLOPIDOGREL BISULFATE 75 MG/1
75 TABLET ORAL DAILY
Qty: 90 TABLET | Refills: 1 | Status: SHIPPED | OUTPATIENT
Start: 2024-07-25

## 2024-07-25 RX ORDER — CLOPIDOGREL BISULFATE 75 MG/1
75 TABLET ORAL DAILY
Qty: 5 TABLET | Refills: 0 | Status: SHIPPED | OUTPATIENT
Start: 2024-07-25 | End: 2024-07-25

## 2024-07-29 RX ORDER — CLOPIDOGREL BISULFATE 75 MG/1
75 TABLET ORAL DAILY
Qty: 5 TABLET | Refills: 0 | OUTPATIENT
Start: 2024-07-29

## 2024-08-07 NOTE — TELEPHONE ENCOUNTER
Please advise.     Okay to print medication out for patient?              Rx Refill Note  Requested Prescriptions     Pending Prescriptions Disp Refills    SITagliptin (JANUVIA) 100 MG tablet 90 tablet 1     Sig: Take 1 tablet by mouth Daily.      Last office visit with prescribing clinician: 2/1/2024  Next office visit with prescribing clinician: 8/23/2024         Tamika Medina MA  08/07/24, 09:14 EDT

## 2024-08-07 NOTE — TELEPHONE ENCOUNTER
Call xfer from [facility] pt would like a paper RX for his WENDIUVIA since its $100 cheaper through [employer]. Please call pt to let him know when he can  once ready.

## 2024-08-09 ENCOUNTER — TELEPHONE (OUTPATIENT)
Dept: INTERNAL MEDICINE | Facility: CLINIC | Age: 74
End: 2024-08-09
Payer: MEDICARE

## 2024-08-09 NOTE — TELEPHONE ENCOUNTER
[facility] staff attempted to follow warm transfer process and was unsuccessful     Caller: Jesus Cuba    Relationship to patient: Self    Best call back number: [phone]     Patient is needing: AN UPDATE ON WRITTEN PRESCRIPTION FOR JANUVIA

## 2024-08-12 NOTE — TELEPHONE ENCOUNTER
Patient called office for an update today. Advised patient that it looks like his written order was completed on Friday. Patient states he will come by the office today to pick it up

## 2024-08-23 ENCOUNTER — OFFICE VISIT (OUTPATIENT)
Dept: INTERNAL MEDICINE | Facility: CLINIC | Age: 74
End: 2024-08-23
Payer: MEDICARE

## 2024-08-23 ENCOUNTER — HOSPITAL ENCOUNTER (OUTPATIENT)
Dept: CARDIOLOGY | Facility: HOSPITAL | Age: 74
Discharge: HOME | End: 2024-08-23

## 2024-08-23 VITALS
WEIGHT: 164 LBS | HEIGHT: 66 IN | BODY MASS INDEX: 26.36 KG/M2 | SYSTOLIC BLOOD PRESSURE: 116 MMHG | HEART RATE: 77 BPM | DIASTOLIC BLOOD PRESSURE: 70 MMHG

## 2024-08-23 VITALS
SYSTOLIC BLOOD PRESSURE: 120 MMHG | HEIGHT: 65 IN | OXYGEN SATURATION: 98 % | BODY MASS INDEX: 27.79 KG/M2 | DIASTOLIC BLOOD PRESSURE: 72 MMHG | WEIGHT: 166.8 LBS | HEART RATE: 65 BPM

## 2024-08-23 DIAGNOSIS — F41.9 ANXIETY: Chronic | ICD-10-CM

## 2024-08-23 DIAGNOSIS — E11.3293 NONPROLIFERATIVE DIABETIC RETINOPATHY OF BOTH EYES: ICD-10-CM

## 2024-08-23 DIAGNOSIS — E11.65 TYPE 2 DIABETES MELLITUS WITH HYPERGLYCEMIA, WITHOUT LONG-TERM CURRENT USE OF INSULIN: Chronic | ICD-10-CM

## 2024-08-23 DIAGNOSIS — I10 ESSENTIAL HYPERTENSION: Primary | Chronic | ICD-10-CM

## 2024-08-23 DIAGNOSIS — Z13.9 SCREENING DUE: ICD-10-CM

## 2024-08-23 DIAGNOSIS — I25.10 CORONARY ARTERY DISEASE INVOLVING NATIVE CORONARY ARTERY OF NATIVE HEART WITHOUT ANGINA PECTORIS: Chronic | ICD-10-CM

## 2024-08-23 DIAGNOSIS — R97.20 ELEVATED PSA: ICD-10-CM

## 2024-08-23 DIAGNOSIS — E78.5 HYPERLIPIDEMIA LDL GOAL <70: Chronic | ICD-10-CM

## 2024-08-23 DIAGNOSIS — G58.9 MONONEUROPATHY: Chronic | ICD-10-CM

## 2024-08-23 DIAGNOSIS — E03.4 HYPOTHYROIDISM DUE TO ACQUIRED ATROPHY OF THYROID: Chronic | ICD-10-CM

## 2024-08-23 LAB
ALBUMIN SERPL-MCNC: 4.5 G/DL (ref 3.5–5.2)
ALBUMIN/GLOB SERPL: 1.8 G/DL
ALP SERPL-CCNC: 97 U/L (ref 39–117)
ALT SERPL-CCNC: 27 U/L (ref 1–41)
AST SERPL-CCNC: 14 U/L (ref 1–40)
BH CV ECHO MEAS - DIST AO DIAM: 1.4 CM
BH CV VAS BP LEFT ARM: NORMAL MMHG
BH CV VAS BP RIGHT ARM: NORMAL MMHG
BH CV VAS SCREENING CAROTID CCA LEFT: NORMAL CM/SEC
BH CV VAS SCREENING CAROTID CCA RIGHT: NORMAL CM/SEC
BH CV VAS SCREENING CAROTID ICA LEFT: NORMAL CM/SEC
BH CV VAS SCREENING CAROTID ICA RIGHT: NORMAL CM/SEC
BH CV XLRA MEAS - MID AO DIAM: 1.65 CM
BH CV XLRA MEAS - PAD LEFT ABI DP: 0.79
BH CV XLRA MEAS - PAD LEFT ABI PT: NORMAL
BH CV XLRA MEAS - PAD LEFT ARM: 116 MMHG
BH CV XLRA MEAS - PAD LEFT LEG DP: 92 MMHG
BH CV XLRA MEAS - PAD LEFT LEG PT: NORMAL MMHG
BH CV XLRA MEAS - PAD RIGHT ABI DP: 1.03
BH CV XLRA MEAS - PAD RIGHT ABI PT: 1.02
BH CV XLRA MEAS - PAD RIGHT ARM: 115 MMHG
BH CV XLRA MEAS - PAD RIGHT LEG DP: 120 MMHG
BH CV XLRA MEAS - PAD RIGHT LEG PT: 119 MMHG
BH CV XLRA MEAS - PROX AO DIAM: 1.71 CM
BH CV XLRA MEAS LEFT ICA/CCA RATIO: 2.35
BH CV XLRA MEAS LEFT PROX ECA PSV: NORMAL CM/SEC
BH CV XLRA MEAS RIGHT ICA/CCA RATIO: 1.1
BH CV XLRA MEAS RIGHT PROX ECA PSV: NORMAL CM/SEC
BILIRUB SERPL-MCNC: 0.4 MG/DL (ref 0–1.2)
BUN SERPL-MCNC: 22 MG/DL (ref 8–23)
BUN/CREAT SERPL: 21.8 (ref 7–25)
CALCIUM SERPL-MCNC: 9.7 MG/DL (ref 8.6–10.5)
CHLORIDE SERPL-SCNC: 104 MMOL/L (ref 98–107)
CHOLEST SERPL-MCNC: 162 MG/DL (ref 0–200)
CO2 SERPL-SCNC: 25.1 MMOL/L (ref 22–29)
CREAT SERPL-MCNC: 1.01 MG/DL (ref 0.76–1.27)
EGFRCR SERPLBLD CKD-EPI 2021: 78.5 ML/MIN/1.73
ERYTHROCYTE [DISTWIDTH] IN BLOOD BY AUTOMATED COUNT: 13.2 % (ref 12.3–15.4)
GLOBULIN SER CALC-MCNC: 2.5 GM/DL
GLUCOSE SERPL-MCNC: 167 MG/DL (ref 65–99)
HBA1C MFR BLD: 8.7 % (ref 4.8–5.6)
HCT VFR BLD AUTO: 43.5 % (ref 37.5–51)
HDLC SERPL-MCNC: 42 MG/DL (ref 40–60)
HGB BLD-MCNC: 14.6 G/DL (ref 13–17.7)
LDLC SERPL CALC-MCNC: 83 MG/DL (ref 0–100)
LDLC/HDLC SERPL: 1.8 {RATIO}
MAXIMAL PREDICTED HEART RATE: 147 BPM
MCH RBC QN AUTO: 28.9 PG (ref 26.6–33)
MCHC RBC AUTO-ENTMCNC: 33.6 G/DL (ref 31.5–35.7)
MCV RBC AUTO: 86 FL (ref 79–97)
PLATELET # BLD AUTO: 215 10*3/MM3 (ref 140–450)
POTASSIUM SERPL-SCNC: 4.5 MMOL/L (ref 3.5–5.2)
PROT SERPL-MCNC: 7 G/DL (ref 6–8.5)
PSA SERPL-MCNC: 16 NG/ML (ref 0–4)
RBC # BLD AUTO: 5.06 10*6/MM3 (ref 4.14–5.8)
SODIUM SERPL-SCNC: 140 MMOL/L (ref 136–145)
STRESS TARGET HR: 125 BPM
TRIGL SERPL-MCNC: 222 MG/DL (ref 0–150)
TSH SERPL DL<=0.005 MIU/L-ACNC: 2.45 UIU/ML (ref 0.27–4.2)
VLDLC SERPL CALC-MCNC: 37 MG/DL (ref 5–40)
WBC # BLD AUTO: 10.84 10*3/MM3 (ref 3.4–10.8)

## 2024-08-23 PROCEDURE — 3078F DIAST BP <80 MM HG: CPT | Performed by: NURSE PRACTITIONER

## 2024-08-23 PROCEDURE — 93799 UNLISTED CV SVC/PROCEDURE: CPT

## 2024-08-23 PROCEDURE — 3052F HG A1C>EQUAL 8.0%<EQUAL 9.0%: CPT | Performed by: NURSE PRACTITIONER

## 2024-08-23 PROCEDURE — 1126F AMNT PAIN NOTED NONE PRSNT: CPT | Performed by: NURSE PRACTITIONER

## 2024-08-23 PROCEDURE — 3074F SYST BP LT 130 MM HG: CPT | Performed by: NURSE PRACTITIONER

## 2024-08-23 PROCEDURE — 1159F MED LIST DOCD IN RCRD: CPT | Performed by: NURSE PRACTITIONER

## 2024-08-23 PROCEDURE — 99214 OFFICE O/P EST MOD 30 MIN: CPT | Performed by: NURSE PRACTITIONER

## 2024-08-23 PROCEDURE — 1160F RVW MEDS BY RX/DR IN RCRD: CPT | Performed by: NURSE PRACTITIONER

## 2024-08-23 NOTE — ASSESSMENT & PLAN NOTE
Coronary artery disease is improving with treatment.  Continue current treatment regimen.  Weight loss.  Regular aerobic exercise.  Cardiac status will be reassessed in 6 months.  He has not had to use PRN nitro.   Continue follow up with cardiology.

## 2024-08-23 NOTE — ASSESSMENT & PLAN NOTE
Your last A1C (3 month average) =   Lab Results   Component Value Date    HGBA1C 8.10 (H) 01/29/2024      Your diabetes is Uncontrolled.  Current treatment:  metformin, januvia, invokana   Needs to work on diet modification.   States if not at goal: agreeable to endocrine referral.       The American Diabetes Association recommends an A1C of less than 7%.  A1C Average Levels Blood Sugar:   6%  126 mg/dL  7%  154 mg/dL  8%  183 mg/dL  9%  212 mg/dL  10%  240 mg/dL  11%  269 mg/dL  12%  298 mg/dL    Glucose goals for many adults with diabetes  Blood sugar before meals  mg/dL  Blood sugar 1-2 hours after the start of a meal Less than 180 mg/dL A1C Less than 7%    Eye Health:   You need a diabetic eye exam yearly.   Please have a copy of the note faxed to my office.   Fax: [phone]    Foot Health:   You need a diabetic foot exam yearly.   Check your feet routinely for any wounds.   You should always check your shoes for any debris that could cause a wound.

## 2024-08-23 NOTE — ASSESSMENT & PLAN NOTE
Stable   Check lab for ongoing therapeutic drug monitoring.  Will modify dose if needed based upon results.

## 2024-08-23 NOTE — ASSESSMENT & PLAN NOTE
Lipid abnormalities are improving with treatment    Plan:  Continue same medication/s without change.      Discussed medication dosage, use, side effects, and goals of treatment in detail.    Counseled patient on lifestyle modifications to help control hyperlipidemia.     Patient Treatment Goals:   LDL goal is less than 70    Lab Results   Component Value Date    CHOL 146 01/29/2024    CHLPL 138 09/01/2023    TRIG 179 (H) 01/29/2024    HDL 37 (L) 01/29/2024    LDL 78 01/29/2024        Followup in 6 months.
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
## 2024-08-23 NOTE — PATIENT INSTRUCTIONS
Diet:    Eat vegetables, fruits, whole grain, low-fat dairy, poultry, fish, beans, nontropical vegetable oils, and nuts, but avoid red meat (i.e., Mediterranean-style diet, DASH [Dietary Approaches to Stop Hypertension] diet).  Limit sugary drinks and sweets.  Limit saturated and trans fat to 5% to 6% of calories.  Limit sodium intake to 2,400 mg daily (about one teaspoon table salt [kosher/sea salt have less sodium per teaspoon]).    Weight loss / Calorie Counting Apps:    Lose It!   [employer] Pal     Exercise:   Engage in moderate-to-vigorous aerobic activity for at least 40 minutes (on average) three to four times each week.    Wearables:   Activity tracker   Step tracker     Skin Care:   Use sun screen SPF >30 daily  Dermatologist for skin check regularly    Bone Health:   [email or web address]    Mental Health:       Vaccines:   Updated COVID booster: expected to be released around August 25th 2024: please contact local pharmacy if not available in office today.   Flu vaccine every fall  CDC recommends Flu vaccines for everyone 6 months and older EVERY season with rare exceptions.

## 2024-08-26 NOTE — PROGRESS NOTES
Mr. Cuba,     I have reviewed your recent lab work.   All labs were in a normal range except as commented below:     As you expected - diabetes is not controlled.  I will refer you to endocrine as we discussed.     Your PSA is now at 16.  I would have you to call their office for follow up.  He should able to access to our labs.     Cardiovascular screen was normal except:   Left side is not able to be calculated secondary to vessel noncompressibility.  Consider diagnostic lower extremity arterial study.    Looking at my notes: you have already been seen by Dr Escobedo for this.   Are you still being seen by him for this.  The last note I see is 9/2021.       Stay well,     Darien Lowe

## 2024-09-16 ENCOUNTER — HOSPITAL ENCOUNTER (EMERGENCY)
Facility: HOSPITAL | Age: 74
Discharge: HOME | End: 2024-09-16
Attending: STUDENT IN AN ORGANIZED HEALTH CARE EDUCATION/TRAINING PROGRAM | Admitting: STUDENT IN AN ORGANIZED HEALTH CARE EDUCATION/TRAINING PROGRAM
Payer: MEDICARE

## 2024-09-16 ENCOUNTER — APPOINTMENT (OUTPATIENT)
Dept: CT IMAGING | Facility: HOSPITAL | Age: 74
End: 2024-09-16
Payer: MEDICARE

## 2024-09-16 VITALS
DIASTOLIC BLOOD PRESSURE: 94 MMHG | WEIGHT: 165 LBS | OXYGEN SATURATION: 97 % | TEMPERATURE: 96.5 F | HEIGHT: 67 IN | BODY MASS INDEX: 25.9 KG/M2 | SYSTOLIC BLOOD PRESSURE: 174 MMHG | RESPIRATION RATE: 16 BRPM | HEART RATE: 72 BPM

## 2024-09-16 DIAGNOSIS — R39.16 BENIGN PROSTATIC HYPERPLASIA (BPH) WITH STRAINING ON URINATION: Primary | ICD-10-CM

## 2024-09-16 DIAGNOSIS — N30.00 ACUTE CYSTITIS WITHOUT HEMATURIA: ICD-10-CM

## 2024-09-16 DIAGNOSIS — N40.1 BENIGN PROSTATIC HYPERPLASIA (BPH) WITH STRAINING ON URINATION: Primary | ICD-10-CM

## 2024-09-16 LAB
ALBUMIN SERPL-MCNC: 4 G/DL (ref 3.5–5.2)
ALBUMIN/GLOB SERPL: 1.5 G/DL
ALP SERPL-CCNC: 85 U/L (ref 39–117)
ALT SERPL W P-5'-P-CCNC: 23 U/L (ref 1–41)
ANION GAP SERPL CALCULATED.3IONS-SCNC: 10.5 MMOL/L (ref 5–15)
AST SERPL-CCNC: 11 U/L (ref 1–40)
BACTERIA UR QL AUTO: ABNORMAL /HPF
BASOPHILS # BLD AUTO: 0.05 10*3/MM3 (ref 0–0.2)
BASOPHILS NFR BLD AUTO: 0.7 % (ref 0–1.5)
BILIRUB SERPL-MCNC: 0.4 MG/DL (ref 0–1.2)
BILIRUB UR QL STRIP: NEGATIVE
BUN SERPL-MCNC: 24 MG/DL (ref 8–23)
BUN/CREAT SERPL: 22.2 (ref 7–25)
CALCIUM SPEC-SCNC: 9.2 MG/DL (ref 8.6–10.5)
CHLORIDE SERPL-SCNC: 107 MMOL/L (ref 98–107)
CLARITY UR: ABNORMAL
CO2 SERPL-SCNC: 19.5 MMOL/L (ref 22–29)
COLOR UR: ABNORMAL
CREAT SERPL-MCNC: 1.08 MG/DL (ref 0.76–1.27)
DEPRECATED RDW RBC AUTO: 42.9 FL (ref 37–54)
EGFRCR SERPLBLD CKD-EPI 2021: 72.5 ML/MIN/1.73
EOSINOPHIL # BLD AUTO: 0.29 10*3/MM3 (ref 0–0.4)
EOSINOPHIL NFR BLD AUTO: 4 % (ref 0.3–6.2)
ERYTHROCYTE [DISTWIDTH] IN BLOOD BY AUTOMATED COUNT: 13.2 % (ref 12.3–15.4)
GLOBULIN UR ELPH-MCNC: 2.6 GM/DL
GLUCOSE SERPL-MCNC: 194 MG/DL (ref 65–99)
GLUCOSE UR STRIP-MCNC: ABNORMAL MG/DL
HCT VFR BLD AUTO: 41.6 % (ref 37.5–51)
HGB BLD-MCNC: 13.3 G/DL (ref 13–17.7)
HGB UR QL STRIP.AUTO: ABNORMAL
HYALINE CASTS UR QL AUTO: ABNORMAL /LPF
IMM GRANULOCYTES # BLD AUTO: 0.04 10*3/MM3 (ref 0–0.05)
IMM GRANULOCYTES NFR BLD AUTO: 0.6 % (ref 0–0.5)
KETONES UR QL STRIP: NEGATIVE
LEUKOCYTE ESTERASE UR QL STRIP.AUTO: ABNORMAL
LYMPHOCYTES # BLD AUTO: 2.03 10*3/MM3 (ref 0.7–3.1)
LYMPHOCYTES NFR BLD AUTO: 28.3 % (ref 19.6–45.3)
MCH RBC QN AUTO: 28.1 PG (ref 26.6–33)
MCHC RBC AUTO-ENTMCNC: 32 G/DL (ref 31.5–35.7)
MCV RBC AUTO: 87.8 FL (ref 79–97)
MONOCYTES # BLD AUTO: 0.83 10*3/MM3 (ref 0.1–0.9)
MONOCYTES NFR BLD AUTO: 11.6 % (ref 5–12)
NEUTROPHILS NFR BLD AUTO: 3.93 10*3/MM3 (ref 1.7–7)
NEUTROPHILS NFR BLD AUTO: 54.8 % (ref 42.7–76)
NITRITE UR QL STRIP: POSITIVE
NRBC BLD AUTO-RTO: 0 /100 WBC (ref 0–0.2)
PH UR STRIP.AUTO: <=5 [PH] (ref 5–8)
PLATELET # BLD AUTO: 209 10*3/MM3 (ref 140–450)
PMV BLD AUTO: 9.6 FL (ref 6–12)
POTASSIUM SERPL-SCNC: 4.2 MMOL/L (ref 3.5–5.2)
PROT SERPL-MCNC: 6.6 G/DL (ref 6–8.5)
PROT UR QL STRIP: ABNORMAL
RBC # BLD AUTO: 4.74 10*6/MM3 (ref 4.14–5.8)
RBC # UR STRIP: ABNORMAL /HPF
REF LAB TEST METHOD: ABNORMAL
SODIUM SERPL-SCNC: 137 MMOL/L (ref 136–145)
SP GR UR STRIP: >1.03 (ref 1–1.03)
SQUAMOUS #/AREA URNS HPF: ABNORMAL /HPF
UROBILINOGEN UR QL STRIP: ABNORMAL
WBC # UR STRIP: ABNORMAL /HPF
WBC NRBC COR # BLD AUTO: 7.17 10*3/MM3 (ref 3.4–10.8)

## 2024-09-16 PROCEDURE — 25510000001 IOPAMIDOL 61 % SOLUTION: Performed by: PHYSICIAN ASSISTANT

## 2024-09-16 PROCEDURE — 74177 CT ABD & PELVIS W/CONTRAST: CPT

## 2024-09-16 PROCEDURE — 51798 US URINE CAPACITY MEASURE: CPT

## 2024-09-16 PROCEDURE — 25010000002 KETOROLAC TROMETHAMINE PER 15 MG: Performed by: PHYSICIAN ASSISTANT

## 2024-09-16 PROCEDURE — 81001 URINALYSIS AUTO W/SCOPE: CPT | Performed by: EMERGENCY MEDICINE

## 2024-09-16 PROCEDURE — 80053 COMPREHEN METABOLIC PANEL: CPT | Performed by: PHYSICIAN ASSISTANT

## 2024-09-16 PROCEDURE — 96374 THER/PROPH/DIAG INJ IV PUSH: CPT

## 2024-09-16 PROCEDURE — 99285 EMERGENCY DEPT VISIT HI MDM: CPT

## 2024-09-16 PROCEDURE — 85025 COMPLETE CBC W/AUTO DIFF WBC: CPT | Performed by: PHYSICIAN ASSISTANT

## 2024-09-16 RX ORDER — TAMSULOSIN HYDROCHLORIDE 0.4 MG/1
0.4 CAPSULE ORAL ONCE
Status: COMPLETED | OUTPATIENT
Start: 2024-09-16 | End: 2024-09-16

## 2024-09-16 RX ORDER — CEPHALEXIN 500 MG/1
500 CAPSULE ORAL 3 TIMES DAILY
Qty: 21 CAPSULE | Refills: 0 | Status: SHIPPED | OUTPATIENT
Start: 2024-09-16

## 2024-09-16 RX ORDER — SODIUM CHLORIDE 0.9 % (FLUSH) 0.9 %
10 SYRINGE (ML) INJECTION AS NEEDED
Status: DISCONTINUED | OUTPATIENT
Start: 2024-09-16 | End: 2024-09-16 | Stop reason: HOSPADM

## 2024-09-16 RX ORDER — IOPAMIDOL 612 MG/ML
85 INJECTION, SOLUTION INTRAVASCULAR
Status: COMPLETED | OUTPATIENT
Start: 2024-09-16 | End: 2024-09-16

## 2024-09-16 RX ORDER — TAMSULOSIN HYDROCHLORIDE 0.4 MG/1
1 CAPSULE ORAL DAILY
Qty: 30 CAPSULE | Refills: 0 | Status: SHIPPED | OUTPATIENT
Start: 2024-09-16 | End: 2024-09-20

## 2024-09-16 RX ORDER — KETOROLAC TROMETHAMINE 15 MG/ML
15 INJECTION, SOLUTION INTRAMUSCULAR; INTRAVENOUS ONCE
Status: COMPLETED | OUTPATIENT
Start: 2024-09-16 | End: 2024-09-16

## 2024-09-16 RX ORDER — TAMSULOSIN HYDROCHLORIDE 0.4 MG/1
1 CAPSULE ORAL DAILY
COMMUNITY
End: 2024-09-16

## 2024-09-16 RX ADMIN — IOPAMIDOL 85 ML: 612 INJECTION, SOLUTION INTRAVENOUS at 07:00

## 2024-09-16 RX ADMIN — TAMSULOSIN HYDROCHLORIDE 0.4 MG: 0.4 CAPSULE ORAL at 06:32

## 2024-09-16 RX ADMIN — KETOROLAC TROMETHAMINE 15 MG: 15 INJECTION, SOLUTION INTRAMUSCULAR; INTRAVENOUS at 06:33

## 2024-09-20 ENCOUNTER — OFFICE VISIT (OUTPATIENT)
Dept: INTERNAL MEDICINE | Facility: CLINIC | Age: 74
End: 2024-09-20
Payer: MEDICARE

## 2024-09-20 ENCOUNTER — PATIENT OUTREACH (OUTPATIENT)
Dept: CASE MANAGEMENT | Facility: OTHER | Age: 74
End: 2024-09-20
Payer: MEDICARE

## 2024-09-20 ENCOUNTER — TELEPHONE (OUTPATIENT)
Dept: INTERNAL MEDICINE | Facility: CLINIC | Age: 74
End: 2024-09-20

## 2024-09-20 VITALS
BODY MASS INDEX: 25.96 KG/M2 | DIASTOLIC BLOOD PRESSURE: 70 MMHG | HEART RATE: 75 BPM | OXYGEN SATURATION: 98 % | SYSTOLIC BLOOD PRESSURE: 132 MMHG | HEIGHT: 67 IN | WEIGHT: 165.4 LBS

## 2024-09-20 DIAGNOSIS — E11.65 TYPE 2 DIABETES MELLITUS WITH HYPERGLYCEMIA, WITHOUT LONG-TERM CURRENT USE OF INSULIN: Chronic | ICD-10-CM

## 2024-09-20 DIAGNOSIS — N40.1 BPH ASSOCIATED WITH NOCTURIA: Chronic | ICD-10-CM

## 2024-09-20 DIAGNOSIS — R35.1 BPH ASSOCIATED WITH NOCTURIA: Chronic | ICD-10-CM

## 2024-09-20 DIAGNOSIS — R30.0 DYSURIA: ICD-10-CM

## 2024-09-20 DIAGNOSIS — I10 ESSENTIAL HYPERTENSION: Primary | Chronic | ICD-10-CM

## 2024-09-20 DIAGNOSIS — R30.0 DYSURIA: Primary | ICD-10-CM

## 2024-09-20 PROCEDURE — 3078F DIAST BP <80 MM HG: CPT | Performed by: NURSE PRACTITIONER

## 2024-09-20 PROCEDURE — 1159F MED LIST DOCD IN RCRD: CPT | Performed by: NURSE PRACTITIONER

## 2024-09-20 PROCEDURE — 3052F HG A1C>EQUAL 8.0%<EQUAL 9.0%: CPT | Performed by: NURSE PRACTITIONER

## 2024-09-20 PROCEDURE — 3075F SYST BP GE 130 - 139MM HG: CPT | Performed by: NURSE PRACTITIONER

## 2024-09-20 PROCEDURE — 99213 OFFICE O/P EST LOW 20 MIN: CPT | Performed by: NURSE PRACTITIONER

## 2024-09-20 PROCEDURE — 1125F AMNT PAIN NOTED PAIN PRSNT: CPT | Performed by: NURSE PRACTITIONER

## 2024-09-20 PROCEDURE — 1160F RVW MEDS BY RX/DR IN RCRD: CPT | Performed by: NURSE PRACTITIONER

## 2024-09-20 RX ORDER — TAMSULOSIN HYDROCHLORIDE 0.4 MG/1
1 CAPSULE ORAL 2 TIMES DAILY
Start: 2024-09-20

## 2024-09-20 NOTE — TELEPHONE ENCOUNTER
Patient asked if Darien can prescribe some pain medication for his UTI pain. Asked provider recommended Tylenol. Patient advised.

## 2024-09-22 LAB
BACTERIA UR CULT: NO GROWTH
BACTERIA UR CULT: NORMAL

## 2024-09-23 ENCOUNTER — TELEPHONE (OUTPATIENT)
Dept: INTERNAL MEDICINE | Facility: CLINIC | Age: 74
End: 2024-09-23
Payer: MEDICARE

## 2024-12-09 ENCOUNTER — NURSE TRIAGE (OUTPATIENT)
Dept: CALL CENTER | Facility: HOSPITAL | Age: 74
End: 2024-12-09
Payer: MEDICARE

## 2024-12-09 ENCOUNTER — OFFICE VISIT (OUTPATIENT)
Dept: INTERNAL MEDICINE | Facility: CLINIC | Age: 74
End: 2024-12-09
Payer: MEDICARE

## 2024-12-09 VITALS
HEART RATE: 63 BPM | WEIGHT: 166.6 LBS | SYSTOLIC BLOOD PRESSURE: 138 MMHG | HEIGHT: 67 IN | DIASTOLIC BLOOD PRESSURE: 78 MMHG | OXYGEN SATURATION: 100 % | BODY MASS INDEX: 26.15 KG/M2

## 2024-12-09 DIAGNOSIS — I10 ESSENTIAL HYPERTENSION: Primary | ICD-10-CM

## 2024-12-09 DIAGNOSIS — F41.9 ANXIETY: Chronic | ICD-10-CM

## 2024-12-09 PROCEDURE — 1159F MED LIST DOCD IN RCRD: CPT | Performed by: NURSE PRACTITIONER

## 2024-12-09 PROCEDURE — 99214 OFFICE O/P EST MOD 30 MIN: CPT | Performed by: NURSE PRACTITIONER

## 2024-12-09 PROCEDURE — 3052F HG A1C>EQUAL 8.0%<EQUAL 9.0%: CPT | Performed by: NURSE PRACTITIONER

## 2024-12-09 PROCEDURE — 3075F SYST BP GE 130 - 139MM HG: CPT | Performed by: NURSE PRACTITIONER

## 2024-12-09 PROCEDURE — 1126F AMNT PAIN NOTED NONE PRSNT: CPT | Performed by: NURSE PRACTITIONER

## 2024-12-09 PROCEDURE — 3078F DIAST BP <80 MM HG: CPT | Performed by: NURSE PRACTITIONER

## 2024-12-09 PROCEDURE — 1160F RVW MEDS BY RX/DR IN RCRD: CPT | Performed by: NURSE PRACTITIONER

## 2024-12-09 RX ORDER — AMLODIPINE BESYLATE 5 MG/1
5 TABLET ORAL NIGHTLY
Qty: 30 TABLET | Refills: 2 | Status: SHIPPED | OUTPATIENT
Start: 2024-12-09

## 2024-12-09 RX ORDER — ALPRAZOLAM 0.5 MG
0.5 TABLET ORAL 2 TIMES DAILY PRN
Qty: 60 TABLET | Refills: 0 | Status: SHIPPED | OUTPATIENT
Start: 2024-12-09

## 2024-12-09 NOTE — ASSESSMENT & PLAN NOTE
Hypertension is borderline  Medication changes per orders.  Dietary sodium restriction.  Regular aerobic exercise.  Ambulatory blood pressure monitoring.  Blood pressure will be reassessedat the next regular appointment.      Will add norvasc 5 mg nightly - he will update with me with home Bp readings in about a week.

## 2024-12-09 NOTE — PROGRESS NOTES
Chief Complaint  Hypertension (191/82 last night /187/74 this morning, 135/68 after a walk)     Subjective:      History of Present Illness {CC  Problem List  Visit  Diagnosis   Encounters  Notes  Medications  Labs  Result Review Imaging  Media :23}     Jesus Cuba presents to [facility] for:      Hypertension:   He continues Avapro 300 mg, Toprol 12.5 mg daily  No shortness of breath or chest pain.     RF: thyroid     Diet recall: eggs and 3 biscuits last night.     Enlarged prostate.   States will have procedure for prostate in 2025.     States burning with urination improved after diflucan for yeast infection.       Discussed diet with diabetes and no carbs/ sugars.               I have reviewed patient's medical history, any new submitted information provided by patient or medical assistant and updated medical record.      Objective:      Physical Exam  Cardiovascular:      Rate and Rhythm: Normal rate and regular rhythm.      Pulses: Normal pulses.      Heart sounds: Normal heart sounds.   Pulmonary:      Effort: Pulmonary effort is normal.      Breath sounds: Normal breath sounds.   Musculoskeletal:      Right lower leg: No edema.      Left lower leg: No edema.        Result Review  Data Reviewed:{ Labs  Result Review  Imaging  Med Tab  Media :23}     The following data was reviewed by: Rigoberto Lowe III, NP-C on 12/09/2024  Common labs          1/29/2024    08:13 1/29/2024    08:17 8/23/2024    08:55 9/16/2024    06:28   Common Labs   Glucose 132   167  194    BUN 19   22  24    Creatinine 0.96   1.01  1.08    Sodium 140   140  137    Potassium 4.0   4.5  4.2    Chloride 106   104  107    Calcium 9.4   9.7  9.2    Total Protein   7.0     Albumin 4.4   4.5  4.0    Total Bilirubin 0.3   0.4  0.4    Alkaline Phosphatase 80   97  85    AST (SGOT) 11   14  11    ALT (SGPT) 19   27  23    WBC 7.64   10.84  7.17    Hemoglobin 14.1   14.6  13.3   "  Hematocrit 41.9   43.5  41.6    Platelets 217   215  209    Total Cholesterol 146       Total Cholesterol   162     Triglycerides 179   222     HDL Cholesterol 37   42     LDL Cholesterol  78   83     Hemoglobin A1C 8.10   8.70     Microalbumin, Urine  <1.2      PSA 3.220   16.000              Vital Signs:   /78 Comment: manual left  Pulse 63   Ht 170.2 cm (67\")   Wt 75.6 kg (166 lb 9.6 oz)   SpO2 100%   BMI 26.09 kg/m²   Estimated body mass index is 26.09 kg/m² as calculated from the following:    Height as of this encounter: 170.2 cm (67\").    Weight as of this encounter: 75.6 kg (166 lb 9.6 oz).        Requested Prescriptions     Signed Prescriptions Disp Refills    ALPRAZolam (XANAX) 0.5 MG tablet 60 tablet 0     Sig: Take 1 tablet by mouth 2 (Two) Times a Day As Needed for Anxiety. Take sparingly.    amLODIPine (Norvasc) 5 MG tablet 30 tablet 2     Sig: Take 1 tablet by mouth Every Night.       Routine medications provided by this office will also be refilled via pharmacy request.       Current Outpatient Medications:     ALPRAZolam (XANAX) 0.5 MG tablet, Take 1 tablet by mouth 2 (Two) Times a Day As Needed for Anxiety. Take sparingly., Disp: 60 tablet, Rfl: 0    aspirin 81 MG EC tablet, Take 1 tablet by mouth Daily., Disp: 90 tablet, Rfl: 3    atorvastatin (LIPITOR) 40 MG tablet, Take 1 tablet by mouth Every Night., Disp: 90 tablet, Rfl: 1    Canagliflozin (Invokana) 300 MG tablet tablet, Take 1 tablet by mouth Daily., Disp: 90 tablet, Rfl: 1    clopidogrel (PLAVIX) 75 MG tablet, Take 1 tablet by mouth Daily., Disp: 90 tablet, Rfl: 1    gabapentin (NEURONTIN) 300 MG capsule, One capsule three times a day as needed for pain., Disp: 270 capsule, Rfl: 1    irbesartan (AVAPRO) 300 MG tablet, Take 1 tablet by mouth Daily., Disp: 90 tablet, Rfl: 1    levothyroxine (SYNTHROID, LEVOTHROID) 75 MCG tablet, Take 1 tablet by mouth Daily., Disp: 90 tablet, Rfl: 1    metFORMIN (GLUCOPHAGE) 1000 MG tablet, Take " 1 tablet by mouth 2 (Two) Times a Day With Meals., Disp: 180 tablet, Rfl: 1    metoprolol succinate XL (TOPROL-XL) 25 MG 24 hr tablet, Take 0.5 tablets by mouth Daily., Disp: 45 tablet, Rfl: 3    SITagliptin (JANUVIA) 100 MG tablet, Take 1 tablet by mouth Daily., Disp: 30 tablet, Rfl: 2    tamsulosin (FLOMAX) 0.4 MG capsule 24 hr capsule, Take 1 capsule by mouth 2 (Two) Times a Day., Disp: , Rfl:     amLODIPine (Norvasc) 5 MG tablet, Take 1 tablet by mouth Every Night., Disp: 30 tablet, Rfl: 2    NITROSTAT 0.4 MG SL tablet, DISSOLVE ONE TABLET UNDER THE TONGUE AS NEEDED (Patient not taking: Reported on 12/9/2024), Disp: 25 tablet, Rfl: 0     Assessment and Plan:      Assessment and Plan {CC Problem List  Visit Diagnosis  ROS  Review (Popup)  Health Maintenance  Quality  BestPractice  Medications  SmartSets  SnapShot Encounters  Media :23}     Diagnoses and all orders for this visit:    1. Essential hypertension (Primary)  Assessment & Plan:  Hypertension is borderline  Medication changes per orders.  Dietary sodium restriction.  Regular aerobic exercise.  Ambulatory blood pressure monitoring.  Blood pressure will be reassessedat the next regular appointment.      Will add norvasc 5 mg nightly - he will update with me with home Bp readings in about a week.     Orders:  -     amLODIPine (Norvasc) 5 MG tablet; Take 1 tablet by mouth Every Night.  Dispense: 30 tablet; Refill: 2    2. Anxiety  -     ALPRAZolam (XANAX) 0.5 MG tablet; Take 1 tablet by mouth 2 (Two) Times a Day As Needed for Anxiety. Take sparingly.  Dispense: 60 tablet; Refill: 0    Take sparingly.          New Medications Ordered This Visit   Medications    ALPRAZolam (XANAX) 0.5 MG tablet     Sig: Take 1 tablet by mouth 2 (Two) Times a Day As Needed for Anxiety. Take sparingly.     Dispense:  60 tablet     Refill:  0    amLODIPine (Norvasc) 5 MG tablet     Sig: Take 1 tablet by mouth Every Night.     Dispense:  30 tablet     Refill:  2              Follow Up {Instructions Charge Capture  Follow-up Communications :23}     Return if symptoms worsen or fail to improve, for Next scheduled follow up.      Patient was given instructions and counseling regarding his condition or for health maintenance advice. Please see specific information pulled into the AVS if appropriate.    Dragon disclaimer:   Much of this encounter note is an electronic transcription/translation of spoken language to printed text. The electronic translation of spoken language may permit erroneous, or at times, nonsensical words or phrases to be inadvertently transcribed; Although I have reviewed the note for such errors, some may still exist.     Additional Patient Counseling:       There are no Patient Instructions on file for this visit.

## 2024-12-09 NOTE — TELEPHONE ENCOUNTER
Patient c/o high blood pressure. /78 last night, 177/84 this morning. Denies any associated symptoms. Reviewed protocol with patient. Already scheduled appointment with PCP for this afternoon.    Reason for Disposition   Systolic BP  >= 160 OR Diastolic >= 100    Additional Information   Negative: Difficult to awaken or acting confused (e.g., disoriented, slurred speech)   Negative: SEVERE difficulty breathing (e.g., struggling for each breath, speaks in single words)   Negative: [1] Weakness of the face, arm or leg on one side of the body AND [2] new-onset   Negative: [1] Numbness (i.e., loss of sensation) of the face, arm or leg on one side of the body AND [2] new-onset   Negative: [1] Chest pain lasts > 5 minutes AND [2] history of heart disease (i.e., heart attack, bypass surgery, angina, angioplasty, CHF)   Negative: [1] Chest pain AND [2] took nitrogylcerin AND [3] pain was not relieved   Negative: Sounds like a life-threatening emergency to the triager   Negative: Symptom is main concern (e.g., headache, chest pain)   Negative: Low blood pressure is main concern   Negative: [1] Systolic BP  >= 160 OR Diastolic >= 100 AND [2] cardiac (e.g., breathing difficulty, chest pain) or neurologic symptoms (e.g., new-onset blurred or double vision, unsteady gait)   Negative: [1] Pregnant 20 or more weeks (or postpartum < 6 weeks) AND [2] new hand or face swelling   Negative: [1] Pregnant 20 or more weeks (or postpartum < 6 weeks) AND [2] Systolic BP >= 160 OR Diastolic >= 110   Negative: [1] Systolic BP  >= 200 OR Diastolic >= 120 AND [2] having NO cardiac or neurologic symptoms   Negative: [1] Pregnant 20 or more weeks (or postpartum < 6 weeks) AND [2] Systolic BP  >= 140 OR Diastolic >= 90   Negative: [1] Systolic BP  >= 180 OR Diastolic >= 110 AND [2] missed most recent dose of blood pressure medication   Negative: Systolic BP  >= 180 OR Diastolic >= 110   Negative: Ran out of BP medications    Answer  "Assessment - Initial Assessment Questions  1. BLOOD PRESSURE: \"What is the blood pressure?\" \"Did you take at least two measurements 5 minutes apart?\"      191/78 last night, 177/84 this morning  2. ONSET: \"When did you take your blood pressure?\"      Last night and this morning  3. HOW: \"How did you take your blood pressure?\" (e.g., automatic home BP monitor, visiting nurse)      Automatic home BP monitor  4. HISTORY: \"Do you have a history of high blood pressure?\"      Yes   5. MEDICINES: \"Are you taking any medicines for blood pressure?\" \"Have you missed any doses recently?\"      Yes, no missed doses  6. OTHER SYMPTOMS: \"Do you have any symptoms?\" (e.g., blurred vision, chest pain, difficulty breathing, headache, weakness)      No   7. PREGNANCY: \"Is there any chance you are pregnant?\" \"When was your last menstrual period?\"      N/A    Protocols used: Blood Pressure - High-ADULT-AH    "

## 2025-01-07 ENCOUNTER — TELEPHONE (OUTPATIENT)
Dept: INTERNAL MEDICINE | Facility: CLINIC | Age: 75
End: 2025-01-07
Payer: MEDICARE

## 2025-01-07 NOTE — TELEPHONE ENCOUNTER
Caller: Jesus Cuba    Relationship: Self    Best call back number: [phone]    What is the best time to reach you: ANYTIME     Who are you requesting to speak with (clinical staff, provider,  specific staff member): CLINICAL     What was the call regarding: PATIENT IS CALLING TO REQUEST A CALL BACK FROM [place], AS WELL AS A WRITTEN PRESCRIPTION FOR HIS SITagliptin (JANUVIA) 100 MG tablet     PLEASE ADVISE

## 2025-01-09 RX ORDER — CANAGLIFLOZIN 300 MG/1
1 TABLET, FILM COATED ORAL DAILY
Qty: 90 TABLET | Refills: 1 | Status: SHIPPED | OUTPATIENT
Start: 2025-01-09

## 2025-01-22 RX ORDER — CLOPIDOGREL BISULFATE 75 MG/1
75 TABLET ORAL DAILY
Qty: 90 TABLET | Refills: 0 | Status: SHIPPED | OUTPATIENT
Start: 2025-01-22

## 2025-02-04 DIAGNOSIS — I10 ESSENTIAL HYPERTENSION: Chronic | ICD-10-CM

## 2025-02-04 DIAGNOSIS — E03.4 HYPOTHYROIDISM DUE TO ACQUIRED ATROPHY OF THYROID: ICD-10-CM

## 2025-02-04 RX ORDER — LEVOTHYROXINE SODIUM 75 UG/1
75 TABLET ORAL DAILY
Qty: 90 TABLET | Refills: 1 | Status: SHIPPED | OUTPATIENT
Start: 2025-02-04

## 2025-02-04 RX ORDER — METOPROLOL SUCCINATE 25 MG/1
12.5 TABLET, EXTENDED RELEASE ORAL DAILY
Qty: 45 TABLET | Refills: 3 | Status: SHIPPED | OUTPATIENT
Start: 2025-02-04

## 2025-02-04 NOTE — TELEPHONE ENCOUNTER
Caller: Jesus Cuba    Relationship: Self    Best call back number: [phone]    Requested Prescriptions:   Requested Prescriptions     Pending Prescriptions Disp Refills    levothyroxine (SYNTHROID, LEVOTHROID) 75 MCG tablet 90 tablet 1     Sig: Take 1 tablet by mouth Daily.        Pharmacy where request should be sent: [id number] - [place] [id number] - [phone] PH - [phone] FX     Last office visit with prescribing clinician: 12/9/2024   Last telemedicine visit with prescribing clinician: Visit date not found   Next office visit with prescribing clinician: 2/10/2025     Additional details provided by patient: NO    Does the patient have less than a 3 day supply:  [] Yes  [x] No    Would you like a call back once the refill request has been completed: [] Yes [x] No    If the office needs to give you a call back, can they leave a voicemail: [] Yes [x] No    Lina Casarez Rep   02/04/25 11:23 EST

## 2025-02-10 ENCOUNTER — OFFICE VISIT (OUTPATIENT)
Dept: INTERNAL MEDICINE | Facility: CLINIC | Age: 75
End: 2025-02-10
Payer: MEDICARE

## 2025-02-10 VITALS
OXYGEN SATURATION: 97 % | HEIGHT: 67 IN | BODY MASS INDEX: 26.02 KG/M2 | DIASTOLIC BLOOD PRESSURE: 72 MMHG | WEIGHT: 165.8 LBS | SYSTOLIC BLOOD PRESSURE: 118 MMHG | HEART RATE: 66 BPM

## 2025-02-10 DIAGNOSIS — E03.4 HYPOTHYROIDISM DUE TO ACQUIRED ATROPHY OF THYROID: Chronic | ICD-10-CM

## 2025-02-10 DIAGNOSIS — E55.9 VITAMIN D DEFICIENCY: ICD-10-CM

## 2025-02-10 DIAGNOSIS — G58.9 MONONEUROPATHY: Chronic | ICD-10-CM

## 2025-02-10 DIAGNOSIS — F41.9 ANXIETY: Chronic | ICD-10-CM

## 2025-02-10 DIAGNOSIS — I25.10 CORONARY ARTERY DISEASE INVOLVING NATIVE CORONARY ARTERY OF NATIVE HEART WITHOUT ANGINA PECTORIS: Chronic | ICD-10-CM

## 2025-02-10 DIAGNOSIS — N40.1 BPH ASSOCIATED WITH NOCTURIA: Chronic | ICD-10-CM

## 2025-02-10 DIAGNOSIS — I10 ESSENTIAL HYPERTENSION: Chronic | ICD-10-CM

## 2025-02-10 DIAGNOSIS — R35.1 BPH ASSOCIATED WITH NOCTURIA: Chronic | ICD-10-CM

## 2025-02-10 DIAGNOSIS — E11.65 TYPE 2 DIABETES MELLITUS WITH HYPERGLYCEMIA, WITHOUT LONG-TERM CURRENT USE OF INSULIN: Chronic | ICD-10-CM

## 2025-02-10 DIAGNOSIS — E78.5 HYPERLIPIDEMIA LDL GOAL <70: Chronic | ICD-10-CM

## 2025-02-10 DIAGNOSIS — N52.01 ERECTILE DYSFUNCTION DUE TO ARTERIAL INSUFFICIENCY: ICD-10-CM

## 2025-02-10 DIAGNOSIS — Z00.00 MEDICARE ANNUAL WELLNESS VISIT, SUBSEQUENT: Primary | ICD-10-CM

## 2025-02-10 DIAGNOSIS — E11.3293 NONPROLIFERATIVE DIABETIC RETINOPATHY OF BOTH EYES: ICD-10-CM

## 2025-02-10 PROCEDURE — 1126F AMNT PAIN NOTED NONE PRSNT: CPT | Performed by: NURSE PRACTITIONER

## 2025-02-10 PROCEDURE — 1170F FXNL STATUS ASSESSED: CPT | Performed by: NURSE PRACTITIONER

## 2025-02-10 PROCEDURE — 96160 PT-FOCUSED HLTH RISK ASSMT: CPT | Performed by: NURSE PRACTITIONER

## 2025-02-10 PROCEDURE — 3078F DIAST BP <80 MM HG: CPT | Performed by: NURSE PRACTITIONER

## 2025-02-10 PROCEDURE — 99214 OFFICE O/P EST MOD 30 MIN: CPT | Performed by: NURSE PRACTITIONER

## 2025-02-10 PROCEDURE — 1159F MED LIST DOCD IN RCRD: CPT | Performed by: NURSE PRACTITIONER

## 2025-02-10 PROCEDURE — 1160F RVW MEDS BY RX/DR IN RCRD: CPT | Performed by: NURSE PRACTITIONER

## 2025-02-10 PROCEDURE — G0439 PPPS, SUBSEQ VISIT: HCPCS | Performed by: NURSE PRACTITIONER

## 2025-02-10 PROCEDURE — 3074F SYST BP LT 130 MM HG: CPT | Performed by: NURSE PRACTITIONER

## 2025-02-10 NOTE — PATIENT INSTRUCTIONS
Medicare Wellness  Personal Prevention Plan of Service     Date of Office Visit:    Encounter Provider:  Rigoberto Lowe III, NP-C  Place of Service:  [facility]  Patient Name: Jesus Cuba  :  1950    As part of the Medicare Wellness portion of your visit today, we are providing you with this personalized preventive plan of services (PPPS). This plan is based upon recommendations of the [place] Preventive Services Task Force (USPSTF) and the Advisory Committee on Immunization Practices (ACIP).    This lists the preventive care services that should be considered, and provides dates of when you are due. Items listed as completed are up-to-date and do not require any further intervention.    Health Maintenance   Topic Date Due    Pneumococcal Vaccine 65+ (1 of 2 - PCV) Never done    ZOSTER VACCINE (1 of 2) Never done    INFLUENZA VACCINE  2024    COVID-19 Vaccine (3 - - season) 2024    ANNUAL WELLNESS VISIT  2025    DIABETIC FOOT EXAM  2025    BMI FOLLOWUP  2025    HEMOGLOBIN A1C  2025    DIABETIC EYE EXAM  2025    LIPID PANEL  2025    COLORECTAL CANCER SCREENING  08/15/2026    HEPATITIS C SCREENING  Completed    URINE MICROALBUMIN  Discontinued    TDAP/TD VACCINES  Discontinued       No orders of the defined types were placed in this encounter.      Return in about 6 months (around 8/10/2025).

## 2025-02-10 NOTE — PROGRESS NOTES
The ABCs of the Annual Wellness Visit  Subsequent Medicare Wellness Visit    Subjective    Jesus Cuba is a 74 y.o. male who presents for a Subsequent Medicare Wellness Visit.    The following portions of the patient's history were reviewed and   updated as appropriate: allergies, current medications, past family history, past medical history, past social history, past surgical history, and problem list.    Wt Readings from Last 4 Encounters:   02/10/25 75.2 kg (165 lb 12.8 oz)   12/09/24 75.6 kg (166 lb 9.6 oz)   09/20/24 75 kg (165 lb 6.4 oz)   09/16/24 74.8 kg (165 lb)       Weight trend is stable.    His cardiovascular risks are:    [] No Known risk factors  [x] Known CAD and being treated     [] Hypertension    [] Hyperlipidemia  [] Diabetes     [] Obesity  [] Family history    [] Current or hx tobacco use  [] Sedentary lifestyle       Male:   [] Testosterone use     Mobility    [x] Ambulates independently  [] Ambulates with cane   [] Ambulates with quad cane  [] Ambulates with rollator   [] Ambulates with walker   [] Mobile with wheelchair   [] Mobile with electric wheelchair     Continence    [x] Continent of bowel and bladder  [] Incontinent bowel and bladder   [] Incontinent bladder   [] Incontinent bowel      Social       Compared to one year ago, the patient feels his physical   health is the same.    Compared to one year ago, the patient feels his mental   health is the same.    Recent Hospitalizations:  He was not admitted to the hospital during the last year.     ER: 9/16/24: difficulty voiding  Is followed by urology: Vasyl     Current Medical Providers:  Patient Care Team:  Rigoberto Lowe III, NP-C as PCP - General (Family Medicine)  Chinyere Sprague MD as Consulting Physician (Cardiology)  Shubham Escobedo MD as Surgeon (Vascular Surgery)  Leroy Flynn MD as Consulting Physician (Urology)    Outpatient Medications Prior to Visit   Medication Sig Dispense Refill    ALPRAZolam  (XANAX) 0.5 MG tablet Take 1 tablet by mouth 2 (Two) Times a Day As Needed for Anxiety. Take sparingly. 60 tablet 0    amLODIPine (Norvasc) 5 MG tablet Take 1 tablet by mouth Every Night. 30 tablet 2    aspirin 81 MG EC tablet Take 1 tablet by mouth Daily. 90 tablet 3    atorvastatin (LIPITOR) 40 MG tablet Take 1 tablet by mouth Every Night. 90 tablet 1    Canagliflozin (Invokana) 300 MG tablet tablet Take 1 tablet by mouth Daily. 90 tablet 1    clopidogrel (PLAVIX) 75 MG tablet TAKE 1 TABLET BY MOUTH EVERY DAY 90 tablet 0    gabapentin (NEURONTIN) 300 MG capsule One capsule three times a day as needed for pain. 270 capsule 1    irbesartan (AVAPRO) 300 MG tablet Take 1 tablet by mouth Daily. 90 tablet 1    levothyroxine (SYNTHROID, LEVOTHROID) 75 MCG tablet Take 1 tablet by mouth Daily. 90 tablet 1    metFORMIN (GLUCOPHAGE) 1000 MG tablet Take 1 tablet by mouth 2 (Two) Times a Day With Meals. 180 tablet 1    metoprolol succinate XL (TOPROL-XL) 25 MG 24 hr tablet Take 0.5 tablets by mouth Daily. 45 tablet 3    NITROSTAT 0.4 MG SL tablet DISSOLVE ONE TABLET UNDER THE TONGUE AS NEEDED 25 tablet 0    SITagliptin (JANUVIA) 100 MG tablet Take 1 tablet by mouth Daily. 90 tablet 1    tamsulosin (FLOMAX) 0.4 MG capsule 24 hr capsule Take 1 capsule by mouth 2 (Two) Times a Day.       No facility-administered medications prior to visit.       No opioid medication identified on active medication list. I have reviewed chart for other potential  high risk medication/s and harmful drug interactions in the elderly.        Aspirin is on active medication list. Aspirin use is indicated based on review of current medical condition/s. Pros and cons of this therapy have been discussed today. Benefits of this medication outweigh potential harm.  Patient has been encouraged to continue taking this medication.  .      Patient Active Problem List   Diagnosis    Essential hypertension    Hypothyroidism    Anxiety    BPH associated with  "nocturia    History of ASCVD    Nicotine dependence in remission    Vasculogenic erectile dysfunction    Umbilical hernia    Type 2 diabetes mellitus with hyperglycemia, without long-term current use of insulin    Hyperlipidemia LDL goal <70    CAD (coronary artery disease)    S/P right coronary artery (RCA) stent placement    History of myocardial infarction    PN (peripheral neuropathy)    Medication management contract signed    PVD (peripheral vascular disease)    Vitamin D deficiency    OA (osteoarthritis)    Exertional dyspnea    Status post insertion of drug-eluting stent into left anterior descending (LAD) artery    Nonproliferative diabetic retinopathy of both eyes    PVC (premature ventricular contraction)     Advance Care Planning  (Click this link to access ACP Navigator)      Advance Directive is not on file.  ACP discussion was held with the patient during this visit. Patient has an advance directive (not in EMR), copy requested.     Objective    Vitals:    02/10/25 1517   BP: 118/72   BP Location: Left arm   Patient Position: Sitting   Cuff Size: Adult   Pulse: 66   SpO2: 97%   Weight: 75.2 kg (165 lb 12.8 oz)   Height: 170.2 cm (67\")   PainSc: 0-No pain     Estimated body mass index is 25.97 kg/m² as calculated from the following:    Height as of this encounter: 170.2 cm (67\").    Weight as of this encounter: 75.2 kg (165 lb 12.8 oz).    BMI is >= 25 and <30. (Overweight) The following options were offered after discussion;: exercise counseling/recommendations and nutrition counseling/recommendations      Jump to Steadi Fall Risk Flowsheet  Gait and Balance Evaluation:  Normal    Does the patient have evidence of cognitive impairment? No          HEALTH RISK ASSESSMENT    Smoking Status:  Social History     Tobacco Use   Smoking Status Never   Smokeless Tobacco Never     Alcohol Consumption:  Social History     Substance and Sexual Activity   Alcohol Use Yes    Alcohol/week: 2.0 standard drinks of " alcohol    Types: 2 Shots of liquor per week    Comment: 2 drinks per week     Fall Risk Screen:    KALA Fall Risk Assessment was completed, and patient is at MODERATE risk for falls. Assessment completed on:2/10/2025    Depression Screenin/10/2025     3:18 PM   PHQ-2/PHQ-9 Depression Screening   Little interest or pleasure in doing things Not at all   Feeling down, depressed, or hopeless Not at all       Health Habits and Functional and Cognitive Screenin/10/2025     3:18 PM   Functional & Cognitive Status   Do you have difficulty preparing food and eating? No   Do you have difficulty bathing yourself, getting dressed or grooming yourself? No   Do you have difficulty using the toilet? No   Do you have difficulty moving around from place to place? No   Do you have trouble with steps or getting out of a bed or a chair? No   Current Diet Well Balanced Diet   Dental Exam Up to date   Eye Exam Up to date   Exercise (times per week) 2 times per week   Current Exercises Include Walking   Do you need help using the phone?  No   Are you deaf or do you have serious difficulty hearing?  No   Do you need help to go to places out of walking distance? No   Do you need help shopping? No   Do you need help preparing meals?  No   Do you need help with housework?  No   Do you need help with laundry? No   Do you need help taking your medications? No   Do you need help managing money? No   Do you ever drive or ride in a car without wearing a seat belt? No   Have you felt unusual stress, anger or loneliness in the last month? Yes   Who do you live with? Alone   If you need help, do you have trouble finding someone available to you? No   Have you been bothered in the last four weeks by sexual problems? No   Do you have difficulty concentrating, remembering or making decisions? No       Age-appropriate Screening Schedule:  Refer to the list below for future screening recommendations based on patient's age, sex and/or  medical conditions. Orders for these recommended tests are listed in the plan section. The patient has been provided with a written plan.    Health Maintenance   Topic Date Due    Pneumococcal Vaccine 65+ (1 of 2 - PCV) Never done    ZOSTER VACCINE (1 of 2) Never done    INFLUENZA VACCINE  07/01/2024    COVID-19 Vaccine (3 - 2024-25 season) 09/01/2024    BMI FOLLOWUP  02/01/2025    HEMOGLOBIN A1C  02/23/2025    DIABETIC EYE EXAM  06/17/2025    LIPID PANEL  08/23/2025    ANNUAL WELLNESS VISIT  02/10/2026    DIABETIC FOOT EXAM  02/10/2026    COLORECTAL CANCER SCREENING  08/15/2026    HEPATITIS C SCREENING  Completed    URINE MICROALBUMIN  Discontinued    TDAP/TD VACCINES  Discontinued                CMS Preventative Services Quick Reference  Risk Factors Identified During Encounter  Diabetic retinopathy: routine follow up with eye specialist  Diet compliance: encouraged to limit sugars and carbs     The above risks/problems have been discussed with the patient.  Pertinent information has been shared with the patient in the After Visit Summary.  An After Visit Summary and PPPS were made available to the patient.    Follow Up:   Next Medicare Wellness visit to be scheduled in 1 year.       Additional E&M Note during same encounter follows:  Patient has multiple medical problems which are significant and separately identifiable that require additional work above and beyond the Medicare Wellness Visit.      Chief Complaint  Medicare Wellness-subsequent    Subjective        Jesus Cuba is also being seen today for AWV and follow up chronic conditions: hypertension, HLD, DMII w PN, CAD (stent to LAD 7/2018), GERD, Anxiety, hypothyroid, PVD (left lower: Dr Escobedo)        Hypertension:   He continues Avapro 300 mg, Toprol 12.5 mg rodri, norvasc   No shortness of breath or chest pain.     CAD: Continues ASA, Plavix, statin (he was changed from pravachol to lipitor and states is tolerating this time: no myalgia)   he had  "follow up with cardiology Progress Notes by Petr Asher MD (06/19/2024 11:15 AM)   Intermittent PVCs - will get holter if continues per note.      DAPT lifelong       DM:   Chronic   Not controlled.    Continues metformin, Januvia 100 mg, Invokana 300 mg daily  Tried ozempic: states did not affect glucose or appetite so stopped due to cost. He had been up to 0.5 mg weekly.     PN: gabapentin - no decrease in sensation     6/17/24: non-proliferative diabetes retinopathy: observation at [facility]      Hypothyroid continues Synthroid 75 mcg daily  Feels doing well on current dose.      Erectile dysfunction: seen by urology: Dr Gee Mccain     BPH   States last psa 3.4   Opted not to do prostate embolization     Hx smoking:   CT Chest Low Dose Cancer Screening WO (06/02/2024 3:53 PM)   No suspicious pulmonary parenchymal lesion has developed.  Continue yearly screen     Objective   Vital Signs:  /72 (BP Location: Left arm, Patient Position: Sitting, Cuff Size: Adult)   Pulse 66   Ht 170.2 cm (67\")   Wt 75.2 kg (165 lb 12.8 oz)   SpO2 97%   BMI 25.97 kg/m²     Physical Exam  Vitals reviewed.   Constitutional:       Appearance: Normal appearance. He is well-developed.   Neck:      Thyroid: No thyromegaly.   Cardiovascular:      Rate and Rhythm: Normal rate and regular rhythm.      Pulses: Normal pulses.      Heart sounds: Normal heart sounds.   Pulmonary:      Effort: Pulmonary effort is normal.      Breath sounds: Normal breath sounds.      Comments: E/U   Abdominal:      General: Bowel sounds are normal.   Musculoskeletal:         General: Normal range of motion.      Cervical back: Normal range of motion and neck supple.   Feet:      Comments: Diabetic Foot Exam Performed and Monofilament Test Performed     Lymphadenopathy:      Cervical: No cervical adenopathy.   Skin:     General: Skin is warm and dry.      Capillary Refill: Capillary refill takes 2 to 3 seconds.   Neurological:      Mental Status: " He is alert and oriented to person, place, and time.   Psychiatric:         Mood and Affect: Mood normal.         Behavior: Behavior normal. Behavior is cooperative.         Thought Content: Thought content normal.         Judgment: Judgment normal.        The following data was reviewed by: Rigoberto Lowe III, NP-C on 02/10/2025:  Common labs          8/23/2024    08:55 9/16/2024    06:28   Common Labs   Glucose 167  194    BUN 22  24    Creatinine 1.01  1.08    Sodium 140  137    Potassium 4.5  4.2    Chloride 104  107    Calcium 9.7  9.2    Total Protein 7.0     Albumin 4.5  4.0    Total Bilirubin 0.4  0.4    Alkaline Phosphatase 97  85    AST (SGOT) 14  11    ALT (SGPT) 27  23    WBC 10.84  7.17    Hemoglobin 14.6  13.3    Hematocrit 43.5  41.6    Platelets 215  209    Total Cholesterol 162     Triglycerides 222     HDL Cholesterol 42     LDL Cholesterol  83     Hemoglobin A1C 8.70     PSA 16.000         Not fasting today: will come back tomorrow for lab work.                Assessment and Plan     Problem List Items Addressed This Visit          Cardiac and Vasculature    CAD (coronary artery disease) (Chronic)    Overview     Due to the presence of severe small vessel disease and multiple stents: Cardiology recommended stay on DAPT as long as he tolerates.    5/11/23 which revealed severe multivessel disease and after discussion, he decided against CABG so PCI to LAD and D1 was performed.            Current Assessment & Plan     Coronary artery disease is improving with treatment.  Continue current treatment regimen.  Weight loss.  Regular aerobic exercise.  Cardiac status will be reassessed in 6 months.  He has not had to use PRN nitro.   Continue follow up with cardiology.          Essential hypertension (Chronic)    Current Assessment & Plan     Hypertension is borderline  Medication changes per orders.  Dietary sodium restriction.  Regular aerobic exercise.  Ambulatory blood pressure  monitoring.  Blood pressure will be reassessedat the next regular appointment.    Continue toprol, avapro, norvasc            Hyperlipidemia LDL goal <70 (Chronic)    Current Assessment & Plan      Lipid abnormalities are improving with treatment    Plan:  Continue same medication/s without change.    Lipitor and low fat diet.     Discussed medication dosage, use, side effects, and goals of treatment in detail.    Counseled patient on lifestyle modifications to help control hyperlipidemia.     Patient Treatment Goals:   LDL goal is less than 70    Lab Results   Component Value Date    CHOL 146 01/29/2024    CHLPL 162 08/23/2024    TRIG 222 (H) 08/23/2024    HDL 42 08/23/2024    LDL 83 08/23/2024        Followup in 6 months.         Relevant Orders    Comprehensive Metabolic Panel    Lipid Panel With LDL / HDL Ratio    CBC (No Diff)       Endocrine and Metabolic    Hypothyroidism (Chronic)    Current Assessment & Plan     Stable   Check lab for ongoing therapeutic drug monitoring.  Will modify dose if needed based upon results.          Relevant Orders    TSH Rfx On Abnormal To Free T4    Type 2 diabetes mellitus with hyperglycemia, without long-term current use of insulin (Chronic)    Overview     Prior treatment:  ozempic     Renal: ARB  Statin: yes     6/17/24: non-proliferative diabetes retinopathy: observation at [facility]          Current Assessment & Plan     Your last A1C (3 month average) =   Lab Results   Component Value Date    HGBA1C 8.70 (H) 08/23/2024      Your diabetes is Uncontrolled 2/2 diet compliance.   Current treatment:  metformin, januvia, invokana   Needs to work on diet modification.   States if not at goal: agreeable to endocrine referral.       The American Diabetes Association recommends an A1C of less than 7%.  A1C Average Levels Blood Sugar:   6%  126 mg/dL  7%  154 mg/dL  8%  183 mg/dL  9%  212 mg/dL  10%  240 mg/dL  11%  269 mg/dL  12%  298 mg/dL    Glucose goals for many adults with  diabetes  Blood sugar before meals  mg/dL  Blood sugar 1-2 hours after the start of a meal Less than 180 mg/dL A1C Less than 7%    Eye Health:   You need a diabetic eye exam yearly.   Please have a copy of the note faxed to my office.   Fax: [phone]    Foot Health:   You need a diabetic foot exam yearly.   Check your feet routinely for any wounds.   You should always check your shoes for any debris that could cause a wound.             Relevant Orders    Hemoglobin A1c    Microalbumin / Creatinine Urine Ratio - Urine, Clean Catch    Vitamin B12    Vitamin D deficiency    Overview     Takes daily supplement.          Relevant Orders    Vitamin D 25 hydroxy       Eye    Nonproliferative diabetic retinopathy of both eyes    Overview     6/17/24: non-proliferative diabetes retinopathy: observation at [facility]          Current Assessment & Plan     Continue yearly eye exams             Genitourinary and Reproductive     BPH associated with nocturia (Chronic)    Current Assessment & Plan     PSA followed by urology -  per patient, improved on flomax     Declined prostate procedure          Vasculogenic erectile dysfunction       Mental Health    Anxiety (Chronic)    Current Assessment & Plan     Rarely takes xanax:   Effective when anxiety is out of control.               Neuro    PN (peripheral neuropathy) (Chronic)    Overview     1/19/21: Started gabapentin          Current Assessment & Plan     Continue gabapentin          Relevant Orders    Vitamin B12     Other Visit Diagnoses       Medicare annual wellness visit, subsequent    -  Primary                      Follow Up     Return in about 6 months (around 8/10/2025).    Patient was given instructions and counseling regarding his condition or for health maintenance advice. Please see specific information pulled into the AVS if appropriate.

## 2025-02-10 NOTE — ASSESSMENT & PLAN NOTE
Lipid abnormalities are improving with treatment    Plan:  Continue same medication/s without change.    Lipitor and low fat diet.     Discussed medication dosage, use, side effects, and goals of treatment in detail.    Counseled patient on lifestyle modifications to help control hyperlipidemia.     Patient Treatment Goals:   LDL goal is less than 70    Lab Results   Component Value Date    CHOL 146 01/29/2024    CHLPL 162 08/23/2024    TRIG 222 (H) 08/23/2024    HDL 42 08/23/2024    LDL 83 08/23/2024        Followup in 6 months.

## 2025-02-10 NOTE — ASSESSMENT & PLAN NOTE
Your last A1C (3 month average) =   Lab Results   Component Value Date    HGBA1C 8.70 (H) 08/23/2024      Your diabetes is Uncontrolled 2/2 diet compliance.   Current treatment:  metformin, januvia, invokana   Needs to work on diet modification.   States if not at goal: agreeable to endocrine referral.       The American Diabetes Association recommends an A1C of less than 7%.  A1C Average Levels Blood Sugar:   6%  126 mg/dL  7%  154 mg/dL  8%  183 mg/dL  9%  212 mg/dL  10%  240 mg/dL  11%  269 mg/dL  12%  298 mg/dL    Glucose goals for many adults with diabetes  Blood sugar before meals  mg/dL  Blood sugar 1-2 hours after the start of a meal Less than 180 mg/dL A1C Less than 7%    Eye Health:   You need a diabetic eye exam yearly.   Please have a copy of the note faxed to my office.   Fax: [phone]    Foot Health:   You need a diabetic foot exam yearly.   Check your feet routinely for any wounds.   You should always check your shoes for any debris that could cause a wound.

## 2025-02-10 NOTE — ASSESSMENT & PLAN NOTE
Hypertension is borderline  Medication changes per orders.  Dietary sodium restriction.  Regular aerobic exercise.  Ambulatory blood pressure monitoring.  Blood pressure will be reassessedat the next regular appointment.    Continue toprol, avapro, norvasc

## 2025-02-11 DIAGNOSIS — E55.9 VITAMIN D DEFICIENCY: ICD-10-CM

## 2025-02-25 DIAGNOSIS — G58.9 MONONEUROPATHY: Chronic | ICD-10-CM

## 2025-02-25 RX ORDER — GABAPENTIN 300 MG/1
CAPSULE ORAL
Qty: 180 CAPSULE | Refills: 1 | Status: SHIPPED | OUTPATIENT
Start: 2025-02-25

## 2025-02-25 NOTE — TELEPHONE ENCOUNTER
Rx Refill Note  Requested Prescriptions     Pending Prescriptions Disp Refills    gabapentin (NEURONTIN) 300 MG capsule [Pharmacy Med Name: Gabapentin Oral Capsule 300 MG] 180 capsule 0     Sig: TAKE 1 CAPSULE BY MOUTH NIGHTLY AS NEEDED FOR NEUROPATHY, CAN GO UP TO TWICE A DAY AS NEEDED      Last office visit with prescribing clinician: 2/10/2025  Next office visit with prescribing clinician: 8/12/2025         Tamika Medina MA  02/25/25, 09:48 EST

## 2025-02-25 NOTE — TELEPHONE ENCOUNTER
PATIENT STATES THE PHARMACY HAS ADVISED THEY HAVE REQUESTED THIS MEDICATION REFILL FOR ONE  WEEK.   PATIENT IS ASKING TO PLEASE ADDRESS.

## 2025-03-05 ENCOUNTER — LAB (OUTPATIENT)
Facility: HOSPITAL | Age: 75
End: 2025-03-05
Payer: MEDICARE

## 2025-03-05 LAB — 25(OH)D3 SERPL-MCNC: 28.8 NG/ML (ref 30–100)

## 2025-03-05 PROCEDURE — 80053 COMPREHEN METABOLIC PANEL: CPT | Performed by: NURSE PRACTITIONER

## 2025-03-05 PROCEDURE — 85027 COMPLETE CBC AUTOMATED: CPT | Performed by: NURSE PRACTITIONER

## 2025-03-05 PROCEDURE — 83036 HEMOGLOBIN GLYCOSYLATED A1C: CPT | Performed by: NURSE PRACTITIONER

## 2025-03-05 PROCEDURE — 82570 ASSAY OF URINE CREATININE: CPT | Performed by: NURSE PRACTITIONER

## 2025-03-05 PROCEDURE — 80061 LIPID PANEL: CPT | Performed by: NURSE PRACTITIONER

## 2025-03-05 PROCEDURE — 82043 UR ALBUMIN QUANTITATIVE: CPT | Performed by: NURSE PRACTITIONER

## 2025-03-05 PROCEDURE — 82607 VITAMIN B-12: CPT | Performed by: NURSE PRACTITIONER

## 2025-03-05 PROCEDURE — 84443 ASSAY THYROID STIM HORMONE: CPT | Performed by: NURSE PRACTITIONER

## 2025-03-05 PROCEDURE — 82306 VITAMIN D 25 HYDROXY: CPT | Performed by: NURSE PRACTITIONER

## 2025-03-26 DIAGNOSIS — I10 ESSENTIAL HYPERTENSION: ICD-10-CM

## 2025-03-27 RX ORDER — AMLODIPINE BESYLATE 5 MG/1
5 TABLET ORAL
Qty: 30 TABLET | Refills: 2 | Status: SHIPPED | OUTPATIENT
Start: 2025-03-27

## 2025-03-31 DIAGNOSIS — E11.9 DIABETES MELLITUS WITHOUT COMPLICATION: ICD-10-CM

## 2025-03-31 RX ORDER — ATORVASTATIN CALCIUM 40 MG/1
40 TABLET, FILM COATED ORAL NIGHTLY
Qty: 90 TABLET | Refills: 3 | Status: SHIPPED | OUTPATIENT
Start: 2025-03-31

## 2025-04-28 RX ORDER — CLOPIDOGREL BISULFATE 75 MG/1
75 TABLET ORAL DAILY
Qty: 90 TABLET | Refills: 0 | Status: SHIPPED | OUTPATIENT
Start: 2025-04-28

## 2025-05-12 ENCOUNTER — TELEPHONE (OUTPATIENT)
Dept: INTERNAL MEDICINE | Facility: CLINIC | Age: 75
End: 2025-05-12

## 2025-05-12 NOTE — TELEPHONE ENCOUNTER
Caller: Jesus Cuba    Relationship: Self    Best call back number: [phone]    What orders are you requesting (i.e. lab or imaging): LUNG SCREENING     In what timeframe would the patient need to come in: AFTER 6/2/25    Where will you receive your lab/imaging services:     Additional notes:

## 2025-05-12 NOTE — TELEPHONE ENCOUNTER
Patient is calling and asking if we can order his lung screening?     Advised you were out of office until Thursday.

## 2025-05-14 DIAGNOSIS — F17.211 CIGARETTE NICOTINE DEPENDENCE IN REMISSION: Primary | ICD-10-CM

## 2025-05-14 NOTE — TELEPHONE ENCOUNTER
Lung cancer screen ordered - they should be contacting him within the next week to schedule.     WCN

## 2025-05-15 DIAGNOSIS — I10 ESSENTIAL HYPERTENSION: Chronic | ICD-10-CM

## 2025-05-15 RX ORDER — METOPROLOL SUCCINATE 25 MG/1
12.5 TABLET, EXTENDED RELEASE ORAL DAILY
Qty: 45 TABLET | Refills: 3 | Status: SHIPPED | OUTPATIENT
Start: 2025-05-15

## 2025-05-22 DIAGNOSIS — G58.9 MONONEUROPATHY: Chronic | ICD-10-CM

## 2025-05-22 DIAGNOSIS — E03.4 HYPOTHYROIDISM DUE TO ACQUIRED ATROPHY OF THYROID: ICD-10-CM

## 2025-05-22 NOTE — TELEPHONE ENCOUNTER
Caller: [facility] - [place], [place] - [address] - [phone] [facility] [phone] FX    Relationship: Pharmacy    Best call back number: [phone]    Requested Prescriptions:   Requested Prescriptions     Pending Prescriptions Disp Refills    levothyroxine (SYNTHROID, LEVOTHROID) 75 MCG tablet 90 tablet 1     Sig: Take 1 tablet by mouth Daily.    gabapentin (NEURONTIN) 300 MG capsule 180 capsule 1     Sig: TAKE 1 CAPSULE BY MOUTH NIGHTLY AS NEEDED FOR NEUROPATHY, CAN GO UP TO TWICE A DAY AS NEEDED        Pharmacy where request should be sent: [facility] - [place], [place] - [address] [facility] - [phone] [facility] [phone] FX     Last office visit with prescribing clinician: 2/10/2025   Last telemedicine visit with prescribing clinician: Visit date not found   Next office visit with prescribing clinician: 8/12/2025     Additional details provided by patient: STATED THAT THE PATIENT ASKED THEM TO REQUEST THE REFILL BUT THEY DO NOT KNOW THE REMAINING QUANTITY     Does the patient have less than a 3 day supply:  [] Yes  [] No    Would you like a call back once the refill request has been completed: [] Yes [x] No    If the office needs to give you a call back, can they leave a voicemail: [] Yes [x] No    Lina Gomez Rep   05/22/25 08:40 EDT

## 2025-05-22 NOTE — TELEPHONE ENCOUNTER
Rx Refill Note  Requested Prescriptions     Pending Prescriptions Disp Refills    levothyroxine (SYNTHROID, LEVOTHROID) 75 MCG tablet 90 tablet 1     Sig: Take 1 tablet by mouth Daily.    gabapentin (NEURONTIN) 300 MG capsule 180 capsule 1     Sig: TAKE 1 CAPSULE BY MOUTH NIGHTLY AS NEEDED FOR NEUROPATHY, CAN GO UP TO TWICE A DAY AS NEEDED      Last office visit with prescribing clinician: 2/10/2025  Next office visit with prescribing clinician: 8/12/2025         Tamika Medina MA  05/22/25, 09:07 EDT

## 2025-05-23 RX ORDER — GABAPENTIN 300 MG/1
CAPSULE ORAL
Qty: 180 CAPSULE | Refills: 1 | Status: SHIPPED | OUTPATIENT
Start: 2025-05-23

## 2025-05-23 RX ORDER — LEVOTHYROXINE SODIUM 75 UG/1
75 TABLET ORAL DAILY
Qty: 90 TABLET | Refills: 1 | Status: SHIPPED | OUTPATIENT
Start: 2025-05-23

## 2025-07-01 ENCOUNTER — TRANSCRIBE ORDERS (OUTPATIENT)
Dept: ADMINISTRATIVE | Facility: HOSPITAL | Age: 75
End: 2025-07-01
Payer: MEDICARE

## 2025-07-01 DIAGNOSIS — Z13.6 ENCOUNTER FOR SCREENING FOR VASCULAR DISEASE: Primary | ICD-10-CM

## 2025-07-02 ENCOUNTER — HOSPITAL ENCOUNTER (OUTPATIENT)
Facility: HOSPITAL | Age: 75
Discharge: HOME | End: 2025-07-02
Admitting: NURSE PRACTITIONER
Payer: MEDICARE

## 2025-07-02 DIAGNOSIS — F17.211 CIGARETTE NICOTINE DEPENDENCE IN REMISSION: ICD-10-CM

## 2025-07-02 PROCEDURE — 71271 CT THORAX LUNG CANCER SCR C-: CPT

## 2025-07-09 ENCOUNTER — OFFICE VISIT (OUTPATIENT)
Dept: CARDIOLOGY | Facility: CLINIC | Age: 75
End: 2025-07-09
Payer: MEDICARE

## 2025-07-09 VITALS
SYSTOLIC BLOOD PRESSURE: 130 MMHG | WEIGHT: 166.2 LBS | OXYGEN SATURATION: 98 % | HEIGHT: 67 IN | BODY MASS INDEX: 26.09 KG/M2 | DIASTOLIC BLOOD PRESSURE: 72 MMHG | HEART RATE: 63 BPM

## 2025-07-09 DIAGNOSIS — Z95.5 STATUS POST INSERTION OF DRUG-ELUTING STENT INTO LEFT ANTERIOR DESCENDING (LAD) ARTERY: ICD-10-CM

## 2025-07-09 DIAGNOSIS — I73.9 PVD (PERIPHERAL VASCULAR DISEASE): Chronic | ICD-10-CM

## 2025-07-09 DIAGNOSIS — Z95.5 S/P RIGHT CORONARY ARTERY (RCA) STENT PLACEMENT: ICD-10-CM

## 2025-07-09 DIAGNOSIS — E78.5 HYPERLIPIDEMIA LDL GOAL <70: Chronic | ICD-10-CM

## 2025-07-09 DIAGNOSIS — I10 ESSENTIAL HYPERTENSION: Chronic | ICD-10-CM

## 2025-07-09 DIAGNOSIS — I25.118 CORONARY ARTERY DISEASE OF NATIVE ARTERY OF NATIVE HEART WITH STABLE ANGINA PECTORIS: Primary | Chronic | ICD-10-CM

## 2025-07-09 PROCEDURE — 93000 ELECTROCARDIOGRAM COMPLETE: CPT | Performed by: STUDENT IN AN ORGANIZED HEALTH CARE EDUCATION/TRAINING PROGRAM

## 2025-07-09 PROCEDURE — 3075F SYST BP GE 130 - 139MM HG: CPT | Performed by: STUDENT IN AN ORGANIZED HEALTH CARE EDUCATION/TRAINING PROGRAM

## 2025-07-09 PROCEDURE — 1159F MED LIST DOCD IN RCRD: CPT | Performed by: STUDENT IN AN ORGANIZED HEALTH CARE EDUCATION/TRAINING PROGRAM

## 2025-07-09 PROCEDURE — 99214 OFFICE O/P EST MOD 30 MIN: CPT | Performed by: STUDENT IN AN ORGANIZED HEALTH CARE EDUCATION/TRAINING PROGRAM

## 2025-07-09 PROCEDURE — 3078F DIAST BP <80 MM HG: CPT | Performed by: STUDENT IN AN ORGANIZED HEALTH CARE EDUCATION/TRAINING PROGRAM

## 2025-07-09 PROCEDURE — 1160F RVW MEDS BY RX/DR IN RCRD: CPT | Performed by: STUDENT IN AN ORGANIZED HEALTH CARE EDUCATION/TRAINING PROGRAM

## 2025-07-09 RX ORDER — CLOPIDOGREL BISULFATE 75 MG/1
75 TABLET ORAL DAILY
Qty: 90 TABLET | Refills: 3 | Status: SHIPPED | OUTPATIENT
Start: 2025-07-09

## 2025-07-09 RX ORDER — IRBESARTAN 300 MG/1
300 TABLET ORAL DAILY
Qty: 90 TABLET | Refills: 3 | Status: SHIPPED | OUTPATIENT
Start: 2025-07-09

## 2025-07-09 RX ORDER — ASPIRIN 81 MG/1
81 TABLET ORAL DAILY
Qty: 90 TABLET | Refills: 3 | Status: SHIPPED | OUTPATIENT
Start: 2025-07-09

## 2025-07-09 RX ORDER — METOPROLOL SUCCINATE 25 MG/1
12.5 TABLET, EXTENDED RELEASE ORAL DAILY
Qty: 45 TABLET | Refills: 3 | Status: SHIPPED | OUTPATIENT
Start: 2025-07-09

## 2025-07-09 RX ORDER — ATORVASTATIN CALCIUM 40 MG/1
40 TABLET, FILM COATED ORAL NIGHTLY
Qty: 90 TABLET | Refills: 3 | Status: SHIPPED | OUTPATIENT
Start: 2025-07-09

## 2025-07-09 NOTE — PROGRESS NOTES
Subjective:     Encounter Date:07/09/2025      Patient ID: Jesus Cuba is a 75 y.o. male.    Chief Complaint:  Follow up CAD    HPI:   75 y.o. male  with CAD status post PCI to LAD, D1, ramus, and prox RCA  in May 2023(see below,) hypertension, hyperlipidemia, diabetes who presents for follow up.  Patient was last seen in June 2024 and he was doing well though complained of intermittent palpitations.  No medication changes were made at that time and he presents today for follow-up.  He has been feeling relatively well and denies palpitations.  He does note mild dyspnea exertion though he is still walking every day.  He also notes very mild cramping in his left calf if he goes up hills or flights of stairs. This does not occur when he is walking on flat ground.        Per prior note:  Patient presented with progressive dyspnea on exertion and elevated troponins on 5/11/23. He underwent LHC on 5/11/23 which revealed severe multivessel disease and after discussion, he decided against CABG so PCI to LAD and D1 was performed.  He was also found to have severe ramus disease and a proximal RCA  so he underwent staged PCI on 5/15/2023 with 2.5 x 15mm Xience Skypoint drug-eluting stent (postdilated with 2.75 mm NC) to severe proximal ramus stenosis and intravascular ultrasound guided PCI with a 2.5 x 38 mm and 3.5 x 33 mm Xience Skypoint drug-eluting stents (postdilated proximally with a 4.5 mm NC and distally with a 3.5 mm NC) to proximal RCA .  Since his PCI, he has felt great, he is been walking frequently and has no symptoms.  With respect to his medication regimen, he is unsure if he is on Imdur as he does not remember that medication.      The following portions of the patient's history were reviewed and updated as appropriate: allergies, current medications, past family history, past medical history, past social history, past surgical history and problem list.     REVIEW OF SYSTEMS:   All systems  reviewed.  Pertinent positives identified in HPI.  All other systems are negative.    Past Medical History:   Diagnosis Date    CAD (coronary artery disease)     Diabetes mellitus     Dyslipidemia     Hyperlipidemia     Hypertension     Hypothyroidism     Myocardial infarction     NSTEMI       Family History   Problem Relation Age of Onset    Cancer Mother         skin    COPD Mother     Hyperlipidemia Mother     Hypertension Mother     Cancer Father         lung    Diabetes Father     Heart disease Father     Hyperlipidemia Father     Hypertension Father     Early death Brother         50    Alcohol abuse Brother     Colon cancer Neg Hx     Colon polyps Neg Hx        Social History     Socioeconomic History    Marital status: Single    Number of children: 1   Tobacco Use    Smoking status: Never     Passive exposure: Never    Smokeless tobacco: Never   Vaping Use    Vaping status: Never Used   Substance and Sexual Activity    Alcohol use: Yes     Alcohol/week: 2.0 standard drinks of alcohol     Types: 2 Shots of liquor per week     Comment: Very rare    Drug use: Never    Sexual activity: Yes     Partners: Female       No Known Allergies    Past Surgical History:   Procedure Laterality Date    CARDIAC CATHETERIZATION  07/03/2018    CARDIAC CATHETERIZATION N/A 05/11/2023    Procedure: Left Heart Cath;  Surgeon: Petr Asher MD;  Location: [facility] CATH INVASIVE LOCATION;  Service: Cardiovascular;  Laterality: N/A;    CARDIAC CATHETERIZATION N/A 05/11/2023    Procedure: Coronary angiography;  Surgeon: Petr Asher MD;  Location: [facility] CATH INVASIVE LOCATION;  Service: Cardiovascular;  Laterality: N/A;    CARDIAC CATHETERIZATION N/A 05/11/2023    Procedure: Percutaneous Coronary Intervention;  Surgeon: Petr Asher MD;  Location: [facility] CATH INVASIVE LOCATION;  Service: Cardiovascular;  Laterality: N/A;    CARDIAC CATHETERIZATION N/A 05/11/2023    Procedure: Stent COREY coronary;  Surgeon: Petr Asher MD;  Location: [facility]  CATH INVASIVE LOCATION;  Service: Cardiovascular;  Laterality: N/A;    CARDIAC CATHETERIZATION N/A 5/15/2023    Procedure: Percutaneous Coronary Intervention;  Surgeon: Petr Asher MD;  Location: [facility] CATH INVASIVE LOCATION;  Service: Cardiology;  Laterality: N/A;    CARDIAC CATHETERIZATION N/A 5/15/2023    Procedure: Stent COREY coronary;  Surgeon: Petr Asher MD;  Location: [facility] CATH INVASIVE LOCATION;  Service: Cardiology;  Laterality: N/A;    CARDIAC CATHETERIZATION N/A 5/15/2023    Procedure: Chronic Total Occlussion;  Surgeon: Petr Asher MD;  Location: [facility] CATH INVASIVE LOCATION;  Service: Cardiology;  Laterality: N/A;    CORONARY ANGIOPLASTY WITH STENT PLACEMENT      4 stents  over several years - first in 2002    INTERVENTIONAL RADIOLOGY PROCEDURE N/A 05/11/2023    Procedure: Intravascular Ultrasound;  Surgeon: Petr Asher MD;  Location: [facility] CATH INVASIVE LOCATION;  Service: Cardiovascular;  Laterality: N/A;    INTERVENTIONAL RADIOLOGY PROCEDURE N/A 5/15/2023    Procedure: Intravascular Ultrasound;  Surgeon: Petr Asher MD;  Location: [facility] CATH INVASIVE LOCATION;  Service: Cardiology;  Laterality: N/A;    SKIN BIOPSY      shoulder left         ECG 12 Lead    Date/Time: 7/9/2025 11:34 AM  Performed by: Petr Asher MD    Authorized by: Petr Asher MD  Comparison: compared with previous ECG from 9/13/2023  Similar to previous ECG  Rhythm: sinus rhythm  Other findings: left ventricular hypertrophy with strain    Clinical impression: abnormal EKG             Objective:         Vitals:    07/09/25 1115   BP: 130/72   Pulse: 63   SpO2: 98%           PHYSICAL EXAM:  GEN: well appearing, in NAD   HEENT: NCAT, EOMI, moist mucus membranes   Respiratory: CTAB, no wheezes, rales or rhonchi  CV: normal rate, regular rhythm, normal S1, S2, no murmurs, rubs, gallops, +2 radial pulses b/l  GI: soft, nontender, nondistended  MSK: no edema  Skin: no rash, warm, dry  Heme/Lymph: no bruising or bleeding  Neuro: Alert  and Oriented x 3, grossly normal motor function        Assessment:         (I25.118) Coronary artery disease of native artery of native heart with stable angina pectoris    (I10) Essential hypertension - Plan: metoprolol succinate XL (TOPROL-XL) 25 MG 24 hr tablet    (E78.5) Hyperlipidemia LDL goal <70    (I73.9) PVD (peripheral vascular disease)    (Z95.5) Status post insertion of drug-eluting stent into left anterior descending (LAD) artery    (Z95.5) S/P right coronary artery (RCA) stent placement    75 y.o. male  with CAD status post PCI to LAD, D1, ramus, and prox RCA  in May 2023(see below,) hypertension, hyperlipidemia, diabetes who presents for follow up.         Plan:       #CAD status post prior PCI  Patient presented with NSTEMI in May 2023 and received PCI with drug-eluting stent to LAD and balloon angioplasty to D1. He was also found to have severe ramus disease and a proximal RCA  so he underwent staged PCI on 5/15/2023 with 2.5 x 15mm Xience Skypoint drug-eluting stent (postdilated with 2.75 mm NC) to severe proximal ramus stenosis and intravascular ultrasound guided PCI with a 2.5 x 38 mm and 3.5 x 33 mm Xience Skypoint drug-eluting stents (postdilated proximally with a 4.5 mm NC and distally with a 3.5 mm NC) to proximal RCA .  He is having very mild dyspnea on exertion, if this were to worsen we will consider stress testing.  - continue aspirin 81mg daily, plavix 75mg daily, will continue indefinitely given multiple layers of stents  - continue atorvastatin 40mg daily  - continue toprol 12.5mg daily  - Had lengthy discussion regarding diabetes control given his prior in-stent restenosis and now multiple layers of stent in multiple vascular territories.    #HTN  BP is well controlled.  - continue Toprol 12.5 mg daily, irbesartan 300 mg daily    #Claudication/PAD  Left calf discomfort when walking up hills.  Vascular screening bundle last year with noncompressibility of his left side.   For  now we will continue aggressive risk factor modification and reassess symptoms.    Dr Lowe,  thank you very much for referring this kind patient to me. Please call me with any questions or concerns. I will see the patient again in the office in one year or earlier as needed.         Petr Asher MD  07/09/25  [facility]    Outpatient Encounter Medications as of 7/9/2025   Medication Sig Dispense Refill    ALPRAZolam (XANAX) 0.5 MG tablet Take 1 tablet by mouth 2 (Two) Times a Day As Needed for Anxiety. Take sparingly. 60 tablet 0    aspirin 81 MG EC tablet Take 1 tablet by mouth Daily. 90 tablet 3    atorvastatin (LIPITOR) 40 MG tablet Take 1 tablet by mouth Every Night. 90 tablet 3    Canagliflozin (Invokana) 300 MG tablet tablet Take 1 tablet by mouth Daily. 90 tablet 1    clopidogrel (PLAVIX) 75 MG tablet Take 1 tablet by mouth Daily. 90 tablet 3    gabapentin (NEURONTIN) 300 MG capsule TAKE 1 CAPSULE BY MOUTH NIGHTLY AS NEEDED FOR NEUROPATHY, CAN GO UP TO TWICE A DAY AS NEEDED 180 capsule 1    irbesartan (AVAPRO) 300 MG tablet Take 1 tablet by mouth Daily. 90 tablet 3    levothyroxine (SYNTHROID, LEVOTHROID) 75 MCG tablet Take 1 tablet by mouth Daily. 90 tablet 1    metFORMIN (GLUCOPHAGE) 1000 MG tablet Take 1 tablet by mouth 2 (Two) Times a Day With Meals. 180 tablet 1    metoprolol succinate XL (TOPROL-XL) 25 MG 24 hr tablet Take 0.5 tablets by mouth Daily. 45 tablet 3    NITROSTAT 0.4 MG SL tablet DISSOLVE ONE TABLET UNDER THE TONGUE AS NEEDED 25 tablet 0    SITagliptin (JANUVIA) 100 MG tablet Take 1 tablet by mouth Daily. 90 tablet 1    tamsulosin (FLOMAX) 0.4 MG capsule 24 hr capsule Take 1 capsule by mouth 2 (Two) Times a Day.      [DISCONTINUED] aspirin 81 MG EC tablet Take 1 tablet by mouth Daily. 90 tablet 3    [DISCONTINUED] atorvastatin (LIPITOR) 40 MG tablet TAKE 1 TABLET EVERY NIGHT 90 tablet 3    [DISCONTINUED] clopidogrel (PLAVIX) 75 MG tablet Take 1 tablet by mouth Daily. 90  tablet 0    [DISCONTINUED] irbesartan (AVAPRO) 300 MG tablet Take 1 tablet by mouth Daily. 90 tablet 1    [DISCONTINUED] metoprolol succinate XL (TOPROL-XL) 25 MG 24 hr tablet Take 0.5 tablets by mouth Daily. 45 tablet 3    [DISCONTINUED] amLODIPine (NORVASC) 5 MG tablet TAKE 1 TABLET BY MOUTH AT NIGHT (Patient not taking: Reported on 7/9/2025) 30 tablet 2     No facility-administered encounter medications on file as of 7/9/2025.

## 2025-07-10 RX ORDER — SITAGLIPTIN 100 MG/1
100 TABLET, FILM COATED ORAL DAILY
Qty: 90 TABLET | Refills: 3 | Status: SHIPPED | OUTPATIENT
Start: 2025-07-10

## 2025-07-10 RX ORDER — CANAGLIFLOZIN 300 MG/1
1 TABLET, FILM COATED ORAL DAILY
Qty: 90 TABLET | Refills: 3 | Status: SHIPPED | OUTPATIENT
Start: 2025-07-10

## 2025-08-08 ENCOUNTER — TELEPHONE (OUTPATIENT)
Dept: INTERNAL MEDICINE | Facility: CLINIC | Age: 75
End: 2025-08-08
Payer: MEDICARE

## 2025-08-11 ENCOUNTER — LAB (OUTPATIENT)
Facility: HOSPITAL | Age: 75
End: 2025-08-11
Payer: MEDICARE

## 2025-08-11 DIAGNOSIS — E03.4 HYPOTHYROIDISM DUE TO ACQUIRED ATROPHY OF THYROID: ICD-10-CM

## 2025-08-11 DIAGNOSIS — E11.9 DIABETES MELLITUS WITHOUT COMPLICATION: Primary | ICD-10-CM

## 2025-08-11 DIAGNOSIS — E11.9 DIABETES MELLITUS WITHOUT COMPLICATION: ICD-10-CM

## 2025-08-11 LAB
ANION GAP SERPL CALCULATED.3IONS-SCNC: 12 MMOL/L (ref 5–15)
BUN SERPL-MCNC: 18 MG/DL (ref 8–23)
BUN/CREAT SERPL: 14.8 (ref 7–25)
CALCIUM SPEC-SCNC: 9.3 MG/DL (ref 8.6–10.5)
CHLORIDE SERPL-SCNC: 104 MMOL/L (ref 98–107)
CO2 SERPL-SCNC: 22 MMOL/L (ref 22–29)
CREAT SERPL-MCNC: 1.22 MG/DL (ref 0.76–1.27)
EGFRCR SERPLBLD CKD-EPI 2021: 62.2 ML/MIN/1.73
GLUCOSE SERPL-MCNC: 325 MG/DL (ref 65–99)
HBA1C MFR BLD: 9 % (ref 4.8–5.6)
POTASSIUM SERPL-SCNC: 4.6 MMOL/L (ref 3.5–5.2)
SODIUM SERPL-SCNC: 138 MMOL/L (ref 136–145)
TSH SERPL DL<=0.05 MIU/L-ACNC: 2.02 UIU/ML (ref 0.27–4.2)

## 2025-08-11 PROCEDURE — 36415 COLL VENOUS BLD VENIPUNCTURE: CPT

## 2025-08-11 PROCEDURE — 83036 HEMOGLOBIN GLYCOSYLATED A1C: CPT

## 2025-08-11 PROCEDURE — 84443 ASSAY THYROID STIM HORMONE: CPT

## 2025-08-11 PROCEDURE — 80048 BASIC METABOLIC PNL TOTAL CA: CPT

## 2025-08-12 ENCOUNTER — OFFICE VISIT (OUTPATIENT)
Dept: INTERNAL MEDICINE | Facility: CLINIC | Age: 75
End: 2025-08-12
Payer: MEDICARE

## 2025-08-12 VITALS
BODY MASS INDEX: 26.02 KG/M2 | DIASTOLIC BLOOD PRESSURE: 78 MMHG | HEART RATE: 50 BPM | SYSTOLIC BLOOD PRESSURE: 120 MMHG | WEIGHT: 165.8 LBS | OXYGEN SATURATION: 93 % | HEIGHT: 67 IN

## 2025-08-12 DIAGNOSIS — E11.65 TYPE 2 DIABETES MELLITUS WITH HYPERGLYCEMIA, WITHOUT LONG-TERM CURRENT USE OF INSULIN: Chronic | ICD-10-CM

## 2025-08-12 DIAGNOSIS — E03.4 HYPOTHYROIDISM DUE TO ACQUIRED ATROPHY OF THYROID: Chronic | ICD-10-CM

## 2025-08-12 DIAGNOSIS — I10 ESSENTIAL HYPERTENSION: Primary | Chronic | ICD-10-CM

## 2025-08-12 DIAGNOSIS — I25.118 CORONARY ARTERY DISEASE OF NATIVE ARTERY OF NATIVE HEART WITH STABLE ANGINA PECTORIS: Chronic | ICD-10-CM

## 2025-08-12 DIAGNOSIS — Z12.5 PROSTATE CANCER SCREENING: ICD-10-CM

## 2025-08-12 RX ORDER — PEN NEEDLE, DIABETIC 30 GX3/16"
1 NEEDLE, DISPOSABLE MISCELLANEOUS TAKE AS DIRECTED
Qty: 100 EACH | Refills: 3 | Status: SHIPPED | OUTPATIENT
Start: 2025-08-12

## 2025-08-15 ENCOUNTER — TELEPHONE (OUTPATIENT)
Dept: INTERNAL MEDICINE | Facility: CLINIC | Age: 75
End: 2025-08-15
Payer: MEDICARE

## (undated) DEVICE — MINI TREK CORONARY DILATATION CATHETER 1.20 MM X 12 MM / RAPID-EXCHANGE: Brand: MINI TREK

## (undated) DEVICE — KT MANIFLD CARDIAC

## (undated) DEVICE — PK CATH CARD 40

## (undated) DEVICE — NC TREK CORONARY DILATATION CATHETER 4.0 MM X 15 MM / RAPID-EXCHANGE: Brand: NC TREK

## (undated) DEVICE — Device: Brand: MIRACLEBROS 3

## (undated) DEVICE — Device: Brand: ASAHI SION BLUE

## (undated) DEVICE — TREK CORONARY DILATATION CATHETER 2.50 MM X 20 MM / RAPID-EXCHANGE: Brand: TREK

## (undated) DEVICE — DEV INDEFLATOR P/N 580289

## (undated) DEVICE — GLIDESHEATH SLENDER STAINLESS STEEL KIT: Brand: GLIDESHEATH SLENDER

## (undated) DEVICE — 6F .070 AL.75 100CM: Brand: CORDIS

## (undated) DEVICE — DEV COMPR RAD VASC BND BENGAL

## (undated) DEVICE — NC TREK CORONARY DILATATION CATHETER 2.5 MM X 15 MM / RAPID-EXCHANGE: Brand: NC TREK

## (undated) DEVICE — NC TREK CORONARY DILATATION CATHETER 3.5 MM X 15 MM / RAPID-EXCHANGE: Brand: NC TREK

## (undated) DEVICE — GW EMR FIX EXCHG J STD .035 3MM 260CM

## (undated) DEVICE — Device: Brand: CORSAIR PRO

## (undated) DEVICE — TREK CORONARY DILATATION CATHETER 2.50 MM X 12 MM / RAPID-EXCHANGE: Brand: TREK

## (undated) DEVICE — NC TREK NEO™ CORONARY DILATATION CATHETER 3.00 MM X 15 MM / RAPID-EXCHANGE: Brand: NC TREK NEO™

## (undated) DEVICE — NC TREK NEO™ CORONARY DILATATION CATHETER 2.50 X 15 MM / RAPID-EXCHANGE: Brand: NC TREK NEO™

## (undated) DEVICE — COPILOT BLEEDBACK CONTROL VALVE: Brand: COPILOT

## (undated) DEVICE — NC TREK CORONARY DILATATION CATHETER 3.0 MM X 15 MM / RAPID-EXCHANGE: Brand: NC TREK

## (undated) DEVICE — NC TREK NEO™ CORONARY DILATATION CATHETER 2.75 MM X 12 MM / RAPID-EXCHANGE: Brand: NC TREK NEO™

## (undated) DEVICE — CORONARY IMAGING CATHETER: Brand: OPTICROSS™ HD

## (undated) DEVICE — CORONARY IMAGING CATHETER: Brand: OPTICROSS™ 6 HD

## (undated) DEVICE — CATH GUIDE LAUNCHER FR 3.5 6F 100CM STD LT

## (undated) DEVICE — NC TREK CORONARY DILATATION CATHETER 3.5 MM X 20 MM / RAPID-EXCHANGE: Brand: NC TREK

## (undated) DEVICE — Device: Brand: FIELDER XT

## (undated) DEVICE — NC TREK CORONARY DILATATION CATHETER 4.5 MM X 12 MM / RAPID-EXCHANGE: Brand: NC TREK

## (undated) DEVICE — Device: Brand: EXTENSION

## (undated) DEVICE — CATH DIAG DXTERITY ULTRA TRANSRADIAL 4.0 5F 100CM 0/SH